# Patient Record
Sex: FEMALE | Race: BLACK OR AFRICAN AMERICAN | Employment: FULL TIME | URBAN - METROPOLITAN AREA
[De-identification: names, ages, dates, MRNs, and addresses within clinical notes are randomized per-mention and may not be internally consistent; named-entity substitution may affect disease eponyms.]

---

## 2020-01-01 ENCOUNTER — HOSPITAL ENCOUNTER (OUTPATIENT)
Dept: LAB | Age: 50
Discharge: HOME | End: 2020-10-12

## 2020-01-01 ENCOUNTER — HOSPITAL ENCOUNTER (OUTPATIENT)
Dept: INFUSION THERAPY | Age: 50
Discharge: HOME | End: 2020-12-09
Payer: COMMERCIAL

## 2020-01-01 ENCOUNTER — HOSPITAL ENCOUNTER (OUTPATIENT)
Dept: INFUSION THERAPY | Age: 50
Discharge: HOME | End: 2020-11-01
Payer: COMMERCIAL

## 2020-01-01 ENCOUNTER — HOSPITAL ENCOUNTER (OUTPATIENT)
Dept: INTERVENTIONAL RADIOLOGY/VASCULAR | Age: 50
Discharge: HOME | End: 2020-10-23
Attending: NURSE PRACTITIONER
Payer: COMMERCIAL

## 2020-01-01 ENCOUNTER — APPOINTMENT (OUTPATIENT)
Dept: INFUSION THERAPY | Age: 50
End: 2020-01-01

## 2020-01-01 ENCOUNTER — HOSPITAL ENCOUNTER (INPATIENT)
Age: 50
LOS: 6 days | Discharge: HOME | DRG: 846 | End: 2020-11-18
Attending: INTERNAL MEDICINE | Admitting: INTERNAL MEDICINE
Payer: COMMERCIAL

## 2020-01-01 ENCOUNTER — HOSPITAL ENCOUNTER (OUTPATIENT)
Dept: INFUSION THERAPY | Age: 50
Discharge: HOME | End: 2020-12-30
Payer: COMMERCIAL

## 2020-01-01 ENCOUNTER — HOSPITAL ENCOUNTER (OUTPATIENT)
Dept: INFUSION THERAPY | Age: 50
End: 2020-01-01

## 2020-01-01 ENCOUNTER — PATIENT OUTREACH (OUTPATIENT)
Dept: CASE MANAGEMENT | Age: 50
End: 2020-01-01

## 2020-01-01 ENCOUNTER — HOSPITAL ENCOUNTER (OUTPATIENT)
Dept: INFUSION THERAPY | Age: 50
Discharge: HOME | End: 2020-10-27
Payer: COMMERCIAL

## 2020-01-01 ENCOUNTER — HOSPITAL ENCOUNTER (OUTPATIENT)
Dept: LAB | Age: 50
Discharge: HOME | End: 2020-10-22
Payer: COMMERCIAL

## 2020-01-01 ENCOUNTER — HOSPITAL ENCOUNTER (OUTPATIENT)
Dept: INTERVENTIONAL RADIOLOGY/VASCULAR | Age: 50
Discharge: HOME | End: 2020-11-16
Attending: NURSE PRACTITIONER
Payer: COMMERCIAL

## 2020-01-01 ENCOUNTER — HOSPITAL ENCOUNTER (OUTPATIENT)
Dept: PET IMAGING | Age: 50
Discharge: HOME | End: 2020-12-02
Payer: COMMERCIAL

## 2020-01-01 ENCOUNTER — HOSPITAL ENCOUNTER (OUTPATIENT)
Dept: LAB | Age: 50
Discharge: HOME | End: 2020-11-27
Payer: COMMERCIAL

## 2020-01-01 ENCOUNTER — HOSPITAL ENCOUNTER (INPATIENT)
Age: 50
LOS: 4 days | Discharge: HOME | DRG: 847 | End: 2020-10-26
Attending: INTERNAL MEDICINE | Admitting: INTERNAL MEDICINE
Payer: COMMERCIAL

## 2020-01-01 ENCOUNTER — APPOINTMENT (OUTPATIENT)
Dept: INFUSION THERAPY | Age: 50
End: 2020-01-01
Payer: COMMERCIAL

## 2020-01-01 ENCOUNTER — APPOINTMENT (OUTPATIENT)
Dept: GENERAL RADIOLOGY | Age: 50
DRG: 846 | End: 2020-01-01
Attending: INTERNAL MEDICINE
Payer: COMMERCIAL

## 2020-01-01 ENCOUNTER — HOSPITAL ENCOUNTER (OUTPATIENT)
Dept: INFUSION THERAPY | Age: 50
End: 2020-01-01
Payer: COMMERCIAL

## 2020-01-01 ENCOUNTER — HOSPITAL ENCOUNTER (OUTPATIENT)
Dept: CT IMAGING | Age: 50
Discharge: HOME | End: 2020-10-15
Attending: INTERNAL MEDICINE
Payer: COMMERCIAL

## 2020-01-01 ENCOUNTER — HOSPITAL ENCOUNTER (OUTPATIENT)
Dept: INFUSION THERAPY | Age: 50
Discharge: HOME | End: 2020-12-10
Payer: COMMERCIAL

## 2020-01-01 ENCOUNTER — HOSPITAL ENCOUNTER (OUTPATIENT)
Dept: INFUSION THERAPY | Age: 50
Discharge: HOME | End: 2020-12-12
Payer: COMMERCIAL

## 2020-01-01 ENCOUNTER — HOSPITAL ENCOUNTER (OUTPATIENT)
Dept: INFUSION THERAPY | Age: 50
Discharge: HOME | End: 2020-11-27
Payer: COMMERCIAL

## 2020-01-01 ENCOUNTER — HOSPITAL ENCOUNTER (OUTPATIENT)
Dept: LAB | Age: 50
Discharge: HOME | End: 2020-11-19
Payer: COMMERCIAL

## 2020-01-01 ENCOUNTER — HOSPITAL ENCOUNTER (OUTPATIENT)
Dept: NON INVASIVE DIAGNOSTICS | Age: 50
Discharge: HOME | End: 2020-10-15
Attending: INTERNAL MEDICINE
Payer: COMMERCIAL

## 2020-01-01 ENCOUNTER — HOSPITAL ENCOUNTER (OUTPATIENT)
Dept: INTERVENTIONAL RADIOLOGY/VASCULAR | Age: 50
Discharge: HOME | DRG: 846 | End: 2020-11-11
Attending: INTERNAL MEDICINE
Payer: COMMERCIAL

## 2020-01-01 ENCOUNTER — HOSPITAL ENCOUNTER (OUTPATIENT)
Dept: LAB | Age: 50
Discharge: HOME | End: 2020-12-03
Payer: COMMERCIAL

## 2020-01-01 ENCOUNTER — HOSPITAL ENCOUNTER (OUTPATIENT)
Dept: INFUSION THERAPY | Age: 50
Discharge: HOME | End: 2020-11-05
Payer: COMMERCIAL

## 2020-01-01 ENCOUNTER — APPOINTMENT (OUTPATIENT)
Dept: INFUSION THERAPY | Age: 50
DRG: 846 | End: 2020-01-01
Payer: COMMERCIAL

## 2020-01-01 ENCOUNTER — HOSPITAL ENCOUNTER (OUTPATIENT)
Dept: INFUSION THERAPY | Age: 50
Discharge: HOME | End: 2020-12-31
Payer: COMMERCIAL

## 2020-01-01 ENCOUNTER — HOSPITAL ENCOUNTER (OUTPATIENT)
Dept: INFUSION THERAPY | Age: 50
Discharge: HOME | End: 2020-11-30
Payer: COMMERCIAL

## 2020-01-01 ENCOUNTER — HOSPITAL ENCOUNTER (OUTPATIENT)
Dept: INTERVENTIONAL RADIOLOGY/VASCULAR | Age: 50
Discharge: HOME | End: 2020-11-20
Attending: INTERNAL MEDICINE
Payer: COMMERCIAL

## 2020-01-01 ENCOUNTER — HOSPITAL ENCOUNTER (OUTPATIENT)
Dept: ULTRASOUND IMAGING | Age: 50
Discharge: HOME | End: 2020-10-13
Attending: INTERNAL MEDICINE
Payer: COMMERCIAL

## 2020-01-01 ENCOUNTER — HOSPITAL ENCOUNTER (OUTPATIENT)
Dept: INFUSION THERAPY | Age: 50
Discharge: HOME | End: 2020-11-21
Payer: COMMERCIAL

## 2020-01-01 ENCOUNTER — HOSPITAL ENCOUNTER (OUTPATIENT)
Dept: LAB | Age: 50
Discharge: HOME | End: 2020-10-07
Payer: COMMERCIAL

## 2020-01-01 ENCOUNTER — HOSPITAL ENCOUNTER (OUTPATIENT)
Dept: INFUSION THERAPY | Age: 50
Discharge: HOME | End: 2020-10-29
Payer: COMMERCIAL

## 2020-01-01 ENCOUNTER — HOSPITAL ENCOUNTER (OUTPATIENT)
Dept: INFUSION THERAPY | Age: 50
Discharge: HOME | End: 2020-12-23
Payer: COMMERCIAL

## 2020-01-01 ENCOUNTER — HOSPITAL ENCOUNTER (OUTPATIENT)
Dept: INTERVENTIONAL RADIOLOGY/VASCULAR | Age: 50
Discharge: HOME | End: 2020-11-02
Attending: NURSE PRACTITIONER
Payer: COMMERCIAL

## 2020-01-01 ENCOUNTER — HOSPITAL ENCOUNTER (OUTPATIENT)
Dept: INFUSION THERAPY | Age: 50
Discharge: HOME | End: 2020-11-19

## 2020-01-01 ENCOUNTER — HOSPITAL ENCOUNTER (OUTPATIENT)
Dept: INFUSION THERAPY | Age: 50
Discharge: HOME | DRG: 846 | End: 2020-11-09
Payer: COMMERCIAL

## 2020-01-01 ENCOUNTER — HOSPITAL ENCOUNTER (OUTPATIENT)
Dept: LAB | Age: 50
Discharge: HOME | DRG: 846 | End: 2020-11-12
Payer: COMMERCIAL

## 2020-01-01 VITALS
RESPIRATION RATE: 18 BRPM | HEART RATE: 107 BPM | OXYGEN SATURATION: 100 % | TEMPERATURE: 97.6 F | DIASTOLIC BLOOD PRESSURE: 64 MMHG | SYSTOLIC BLOOD PRESSURE: 105 MMHG

## 2020-01-01 VITALS
HEART RATE: 103 BPM | TEMPERATURE: 98.9 F | DIASTOLIC BLOOD PRESSURE: 76 MMHG | OXYGEN SATURATION: 100 % | WEIGHT: 190.4 LBS | BODY MASS INDEX: 32.5 KG/M2 | SYSTOLIC BLOOD PRESSURE: 114 MMHG | RESPIRATION RATE: 18 BRPM | HEIGHT: 64 IN

## 2020-01-01 VITALS
DIASTOLIC BLOOD PRESSURE: 72 MMHG | OXYGEN SATURATION: 96 % | RESPIRATION RATE: 16 BRPM | HEIGHT: 64 IN | BODY MASS INDEX: 33.29 KG/M2 | WEIGHT: 195 LBS | SYSTOLIC BLOOD PRESSURE: 102 MMHG | TEMPERATURE: 98.7 F | HEART RATE: 73 BPM

## 2020-01-01 VITALS
HEART RATE: 68 BPM | DIASTOLIC BLOOD PRESSURE: 85 MMHG | RESPIRATION RATE: 16 BRPM | OXYGEN SATURATION: 100 % | SYSTOLIC BLOOD PRESSURE: 139 MMHG

## 2020-01-01 VITALS
HEART RATE: 98 BPM | DIASTOLIC BLOOD PRESSURE: 63 MMHG | BODY MASS INDEX: 32.44 KG/M2 | SYSTOLIC BLOOD PRESSURE: 127 MMHG | OXYGEN SATURATION: 100 % | RESPIRATION RATE: 16 BRPM | WEIGHT: 190 LBS | TEMPERATURE: 99 F | HEIGHT: 64 IN

## 2020-01-01 VITALS
DIASTOLIC BLOOD PRESSURE: 71 MMHG | HEART RATE: 77 BPM | TEMPERATURE: 98.4 F | SYSTOLIC BLOOD PRESSURE: 110 MMHG | RESPIRATION RATE: 18 BRPM

## 2020-01-01 VITALS
OXYGEN SATURATION: 99 % | SYSTOLIC BLOOD PRESSURE: 122 MMHG | BODY MASS INDEX: 30.73 KG/M2 | RESPIRATION RATE: 16 BRPM | DIASTOLIC BLOOD PRESSURE: 81 MMHG | TEMPERATURE: 98.7 F | HEART RATE: 111 BPM | WEIGHT: 179 LBS

## 2020-01-01 VITALS
RESPIRATION RATE: 18 BRPM | HEART RATE: 92 BPM | OXYGEN SATURATION: 98 % | TEMPERATURE: 97.6 F | BODY MASS INDEX: 31.89 KG/M2 | SYSTOLIC BLOOD PRESSURE: 125 MMHG | WEIGHT: 180 LBS | DIASTOLIC BLOOD PRESSURE: 86 MMHG

## 2020-01-01 VITALS
SYSTOLIC BLOOD PRESSURE: 118 MMHG | RESPIRATION RATE: 16 BRPM | BODY MASS INDEX: 31.76 KG/M2 | WEIGHT: 186 LBS | TEMPERATURE: 98.4 F | DIASTOLIC BLOOD PRESSURE: 73 MMHG | HEART RATE: 99 BPM | HEIGHT: 64 IN | OXYGEN SATURATION: 99 %

## 2020-01-01 VITALS
HEIGHT: 64 IN | TEMPERATURE: 97.9 F | WEIGHT: 176.8 LBS | BODY MASS INDEX: 30.19 KG/M2 | SYSTOLIC BLOOD PRESSURE: 107 MMHG | DIASTOLIC BLOOD PRESSURE: 69 MMHG | OXYGEN SATURATION: 99 % | RESPIRATION RATE: 16 BRPM | HEART RATE: 103 BPM

## 2020-01-01 VITALS
DIASTOLIC BLOOD PRESSURE: 79 MMHG | RESPIRATION RATE: 16 BRPM | TEMPERATURE: 97.1 F | SYSTOLIC BLOOD PRESSURE: 123 MMHG | HEART RATE: 91 BPM | OXYGEN SATURATION: 100 %

## 2020-01-01 VITALS
SYSTOLIC BLOOD PRESSURE: 125 MMHG | RESPIRATION RATE: 16 BRPM | OXYGEN SATURATION: 100 % | TEMPERATURE: 98.4 F | DIASTOLIC BLOOD PRESSURE: 76 MMHG | HEART RATE: 104 BPM

## 2020-01-01 VITALS
WEIGHT: 190 LBS | DIASTOLIC BLOOD PRESSURE: 89 MMHG | HEART RATE: 74 BPM | HEIGHT: 64 IN | TEMPERATURE: 97.6 F | RESPIRATION RATE: 16 BRPM | BODY MASS INDEX: 32.44 KG/M2 | OXYGEN SATURATION: 100 % | SYSTOLIC BLOOD PRESSURE: 138 MMHG

## 2020-01-01 VITALS
OXYGEN SATURATION: 97 % | TEMPERATURE: 98 F | RESPIRATION RATE: 16 BRPM | DIASTOLIC BLOOD PRESSURE: 64 MMHG | BODY MASS INDEX: 30.22 KG/M2 | SYSTOLIC BLOOD PRESSURE: 100 MMHG | HEART RATE: 86 BPM | WEIGHT: 177 LBS | HEIGHT: 64 IN

## 2020-01-01 VITALS
SYSTOLIC BLOOD PRESSURE: 109 MMHG | HEIGHT: 64 IN | BODY MASS INDEX: 32.6 KG/M2 | HEART RATE: 85 BPM | DIASTOLIC BLOOD PRESSURE: 66 MMHG | RESPIRATION RATE: 16 BRPM

## 2020-01-01 VITALS
DIASTOLIC BLOOD PRESSURE: 92 MMHG | TEMPERATURE: 98.1 F | SYSTOLIC BLOOD PRESSURE: 139 MMHG | OXYGEN SATURATION: 100 % | HEART RATE: 100 BPM | RESPIRATION RATE: 16 BRPM

## 2020-01-01 VITALS
TEMPERATURE: 98 F | HEART RATE: 97 BPM | OXYGEN SATURATION: 95 % | SYSTOLIC BLOOD PRESSURE: 128 MMHG | RESPIRATION RATE: 16 BRPM | DIASTOLIC BLOOD PRESSURE: 78 MMHG

## 2020-01-01 VITALS
SYSTOLIC BLOOD PRESSURE: 113 MMHG | HEART RATE: 113 BPM | OXYGEN SATURATION: 97 % | TEMPERATURE: 98 F | RESPIRATION RATE: 16 BRPM | DIASTOLIC BLOOD PRESSURE: 70 MMHG

## 2020-01-01 VITALS
RESPIRATION RATE: 18 BRPM | SYSTOLIC BLOOD PRESSURE: 110 MMHG | OXYGEN SATURATION: 100 % | TEMPERATURE: 97.8 F | DIASTOLIC BLOOD PRESSURE: 75 MMHG | HEART RATE: 103 BPM

## 2020-01-01 VITALS
DIASTOLIC BLOOD PRESSURE: 74 MMHG | WEIGHT: 196.4 LBS | HEIGHT: 64 IN | SYSTOLIC BLOOD PRESSURE: 104 MMHG | HEART RATE: 84 BPM | RESPIRATION RATE: 18 BRPM | TEMPERATURE: 97.3 F | OXYGEN SATURATION: 100 % | BODY MASS INDEX: 33.53 KG/M2

## 2020-01-01 VITALS
RESPIRATION RATE: 18 BRPM | SYSTOLIC BLOOD PRESSURE: 114 MMHG | OXYGEN SATURATION: 100 % | HEART RATE: 94 BPM | DIASTOLIC BLOOD PRESSURE: 73 MMHG | TEMPERATURE: 97.3 F

## 2020-01-01 VITALS
TEMPERATURE: 97.9 F | SYSTOLIC BLOOD PRESSURE: 126 MMHG | HEART RATE: 101 BPM | RESPIRATION RATE: 18 BRPM | DIASTOLIC BLOOD PRESSURE: 77 MMHG | OXYGEN SATURATION: 98 %

## 2020-01-01 DIAGNOSIS — C83.37 DIFFUSE LARGE B-CELL LYMPHOMA OF SPLEEN (HCC): Primary | ICD-10-CM

## 2020-01-01 DIAGNOSIS — C83.30 DIFFUSE LARGE B-CELL LYMPHOMA, UNSPECIFIED BODY REGION (HCC): Primary | ICD-10-CM

## 2020-01-01 DIAGNOSIS — C83.30 DIFFUSE LARGE B-CELL LYMPHOMA, UNSPECIFIED BODY REGION (HCC): ICD-10-CM

## 2020-01-01 DIAGNOSIS — Z51.11 ADMISSION FOR ANTINEOPLASTIC CHEMOTHERAPY: ICD-10-CM

## 2020-01-01 DIAGNOSIS — C83.37 DIFFUSE LARGE B-CELL LYMPHOMA OF SPLEEN (HCC): ICD-10-CM

## 2020-01-01 DIAGNOSIS — D84.9 IMMUNOCOMPROMISED (HCC): ICD-10-CM

## 2020-01-01 DIAGNOSIS — C85.90 LYMPHOMA (HCC): ICD-10-CM

## 2020-01-01 DIAGNOSIS — Z95.828 PORT-A-CATH IN PLACE: ICD-10-CM

## 2020-01-01 DIAGNOSIS — T82.898A OCCLUDED PICC LINE, INITIAL ENCOUNTER (HCC): ICD-10-CM

## 2020-01-01 LAB
ABO + RH BLD: NORMAL
ALBUMIN SERPL-MCNC: 2.6 G/DL (ref 3.5–5)
ALBUMIN SERPL-MCNC: 2.7 G/DL (ref 3.5–5)
ALBUMIN SERPL-MCNC: 2.8 G/DL (ref 3.5–5)
ALBUMIN SERPL-MCNC: 2.9 G/DL (ref 3.5–5)
ALBUMIN SERPL-MCNC: 3 G/DL (ref 3.5–5)
ALBUMIN SERPL-MCNC: 3 G/DL (ref 3.5–5)
ALBUMIN SERPL-MCNC: 3.1 G/DL (ref 3.5–5)
ALBUMIN SERPL-MCNC: 3.1 G/DL (ref 3.5–5)
ALBUMIN SERPL-MCNC: 3.2 G/DL (ref 3.5–5)
ALBUMIN SERPL-MCNC: 3.3 G/DL (ref 3.5–5)
ALBUMIN SERPL-MCNC: 3.3 G/DL (ref 3.5–5)
ALBUMIN SERPL-MCNC: 3.4 G/DL (ref 3.5–5)
ALBUMIN/GLOB SERPL: 0.6 {RATIO} (ref 1.2–3.5)
ALBUMIN/GLOB SERPL: 0.7 {RATIO} (ref 1.2–3.5)
ALBUMIN/GLOB SERPL: 0.8 {RATIO} (ref 1.2–3.5)
ALBUMIN/GLOB SERPL: 0.9 {RATIO} (ref 1.2–3.5)
ALP SERPL-CCNC: 296 U/L (ref 50–136)
ALP SERPL-CCNC: 426 U/L (ref 50–136)
ALP SERPL-CCNC: 444 U/L (ref 50–136)
ALP SERPL-CCNC: 453 U/L (ref 50–136)
ALP SERPL-CCNC: 479 U/L (ref 50–136)
ALP SERPL-CCNC: 495 U/L (ref 50–136)
ALP SERPL-CCNC: 507 U/L (ref 50–136)
ALP SERPL-CCNC: 541 U/L (ref 50–136)
ALP SERPL-CCNC: 546 U/L (ref 50–136)
ALP SERPL-CCNC: 554 U/L (ref 50–136)
ALP SERPL-CCNC: 561 U/L (ref 50–136)
ALP SERPL-CCNC: 571 U/L (ref 50–136)
ALP SERPL-CCNC: 578 U/L (ref 50–136)
ALP SERPL-CCNC: 611 U/L (ref 50–130)
ALP SERPL-CCNC: 612 U/L (ref 50–136)
ALP SERPL-CCNC: 618 U/L (ref 50–136)
ALP SERPL-CCNC: 620 U/L (ref 50–130)
ALP SERPL-CCNC: 631 U/L (ref 50–136)
ALP SERPL-CCNC: 637 U/L (ref 50–130)
ALP SERPL-CCNC: 643 U/L (ref 50–130)
ALP SERPL-CCNC: 646 U/L (ref 50–136)
ALP SERPL-CCNC: 661 U/L (ref 50–130)
ALP SERPL-CCNC: 671 U/L (ref 50–136)
ALP SERPL-CCNC: 673 U/L (ref 50–136)
ALP SERPL-CCNC: 683 U/L (ref 50–136)
ALP SERPL-CCNC: 710 U/L (ref 50–136)
ALT SERPL-CCNC: 22 U/L (ref 12–65)
ALT SERPL-CCNC: 27 U/L (ref 12–65)
ALT SERPL-CCNC: 27 U/L (ref 12–65)
ALT SERPL-CCNC: 28 U/L (ref 12–65)
ALT SERPL-CCNC: 28 U/L (ref 12–65)
ALT SERPL-CCNC: 30 U/L (ref 12–65)
ALT SERPL-CCNC: 31 U/L (ref 12–65)
ALT SERPL-CCNC: 34 U/L (ref 12–65)
ALT SERPL-CCNC: 36 U/L (ref 12–65)
ALT SERPL-CCNC: 36 U/L (ref 12–65)
ALT SERPL-CCNC: 38 U/L (ref 12–65)
ALT SERPL-CCNC: 38 U/L (ref 12–65)
ALT SERPL-CCNC: 40 U/L (ref 12–65)
ALT SERPL-CCNC: 40 U/L (ref 12–65)
ALT SERPL-CCNC: 41 U/L (ref 12–65)
ALT SERPL-CCNC: 45 U/L (ref 12–65)
ALT SERPL-CCNC: 47 U/L (ref 12–65)
ALT SERPL-CCNC: 50 U/L (ref 12–65)
ALT SERPL-CCNC: 50 U/L (ref 12–65)
ALT SERPL-CCNC: 53 U/L (ref 12–65)
ANION GAP SERPL CALC-SCNC: 3 MMOL/L (ref 7–16)
ANION GAP SERPL CALC-SCNC: 4 MMOL/L (ref 7–16)
ANION GAP SERPL CALC-SCNC: 4 MMOL/L (ref 7–16)
ANION GAP SERPL CALC-SCNC: 5 MMOL/L (ref 7–16)
ANION GAP SERPL CALC-SCNC: 6 MMOL/L (ref 7–16)
ANION GAP SERPL CALC-SCNC: 7 MMOL/L (ref 7–16)
ANION GAP SERPL CALC-SCNC: 8 MMOL/L (ref 7–16)
ANION GAP SERPL CALC-SCNC: 9 MMOL/L (ref 7–16)
APPEARANCE FLD: NORMAL
AST SERPL-CCNC: 115 U/L (ref 15–37)
AST SERPL-CCNC: 145 U/L (ref 15–37)
AST SERPL-CCNC: 162 U/L (ref 15–37)
AST SERPL-CCNC: 47 U/L (ref 15–37)
AST SERPL-CCNC: 56 U/L (ref 15–37)
AST SERPL-CCNC: 58 U/L (ref 15–37)
AST SERPL-CCNC: 60 U/L (ref 15–37)
AST SERPL-CCNC: 60 U/L (ref 15–37)
AST SERPL-CCNC: 62 U/L (ref 15–37)
AST SERPL-CCNC: 63 U/L (ref 15–37)
AST SERPL-CCNC: 63 U/L (ref 15–37)
AST SERPL-CCNC: 64 U/L (ref 15–37)
AST SERPL-CCNC: 66 U/L (ref 15–37)
AST SERPL-CCNC: 66 U/L (ref 15–37)
AST SERPL-CCNC: 75 U/L (ref 15–37)
AST SERPL-CCNC: 75 U/L (ref 15–37)
AST SERPL-CCNC: 78 U/L (ref 15–37)
AST SERPL-CCNC: 78 U/L (ref 15–37)
AST SERPL-CCNC: 81 U/L (ref 15–37)
AST SERPL-CCNC: 81 U/L (ref 15–37)
AST SERPL-CCNC: 82 U/L (ref 15–37)
AST SERPL-CCNC: 83 U/L (ref 15–37)
AST SERPL-CCNC: 85 U/L (ref 15–37)
AST SERPL-CCNC: 85 U/L (ref 15–37)
AST SERPL-CCNC: 86 U/L (ref 15–37)
AST SERPL-CCNC: 87 U/L (ref 15–37)
BASOPHILS # BLD: 0 K/UL (ref 0–0.2)
BASOPHILS # BLD: 0.1 K/UL (ref 0–0.2)
BASOPHILS NFR BLD: 0 % (ref 0–2)
BASOPHILS NFR BLD: 1 % (ref 0–2)
BILIRUB SERPL-MCNC: 0.3 MG/DL (ref 0.2–1.1)
BILIRUB SERPL-MCNC: 0.4 MG/DL (ref 0.2–1.1)
BILIRUB SERPL-MCNC: 0.5 MG/DL (ref 0.2–1.1)
BILIRUB SERPL-MCNC: 0.5 MG/DL (ref 0.2–1.1)
BILIRUB SERPL-MCNC: 0.6 MG/DL (ref 0.2–1.1)
BILIRUB SERPL-MCNC: 0.7 MG/DL (ref 0.2–1.1)
BILIRUB SERPL-MCNC: 0.8 MG/DL (ref 0.2–1.1)
BILIRUB SERPL-MCNC: 0.9 MG/DL (ref 0.2–1.1)
BILIRUB SERPL-MCNC: 0.9 MG/DL (ref 0.2–1.1)
BILIRUB SERPL-MCNC: 1 MG/DL (ref 0.2–1.1)
BILIRUB SERPL-MCNC: 1.3 MG/DL (ref 0.2–1.1)
BLD PROD TYP BPU: NORMAL
BLOOD BANK CMNT PATIENT-IMP: NORMAL
BLOOD GROUP ANTIBODIES SERPL: NORMAL
BPU ID: NORMAL
BUN SERPL-MCNC: 10 MG/DL (ref 6–23)
BUN SERPL-MCNC: 10 MG/DL (ref 6–23)
BUN SERPL-MCNC: 11 MG/DL (ref 6–23)
BUN SERPL-MCNC: 13 MG/DL (ref 6–23)
BUN SERPL-MCNC: 14 MG/DL (ref 6–23)
BUN SERPL-MCNC: 20 MG/DL (ref 6–23)
BUN SERPL-MCNC: 25 MG/DL (ref 6–23)
BUN SERPL-MCNC: 4 MG/DL (ref 6–23)
BUN SERPL-MCNC: 5 MG/DL (ref 6–23)
BUN SERPL-MCNC: 6 MG/DL (ref 6–23)
BUN SERPL-MCNC: 7 MG/DL (ref 6–23)
BUN SERPL-MCNC: 8 MG/DL (ref 6–23)
BUN SERPL-MCNC: 9 MG/DL (ref 6–23)
BUN SERPL-MCNC: 9 MG/DL (ref 6–23)
CALCIUM SERPL-MCNC: 10 MG/DL (ref 8.3–10.4)
CALCIUM SERPL-MCNC: 8.5 MG/DL (ref 8.3–10.4)
CALCIUM SERPL-MCNC: 8.7 MG/DL (ref 8.3–10.4)
CALCIUM SERPL-MCNC: 8.7 MG/DL (ref 8.3–10.4)
CALCIUM SERPL-MCNC: 8.8 MG/DL (ref 8.3–10.4)
CALCIUM SERPL-MCNC: 9 MG/DL (ref 8.3–10.4)
CALCIUM SERPL-MCNC: 9 MG/DL (ref 8.3–10.4)
CALCIUM SERPL-MCNC: 9.1 MG/DL (ref 8.3–10.4)
CALCIUM SERPL-MCNC: 9.2 MG/DL (ref 8.3–10.4)
CALCIUM SERPL-MCNC: 9.2 MG/DL (ref 8.3–10.4)
CALCIUM SERPL-MCNC: 9.3 MG/DL (ref 8.3–10.4)
CALCIUM SERPL-MCNC: 9.4 MG/DL (ref 8.3–10.4)
CALCIUM SERPL-MCNC: 9.5 MG/DL (ref 8.3–10.4)
CALCIUM SERPL-MCNC: 9.6 MG/DL (ref 8.3–10.4)
CALCIUM SERPL-MCNC: 9.7 MG/DL (ref 8.3–10.4)
CALCIUM SERPL-MCNC: 9.7 MG/DL (ref 8.3–10.4)
CALCIUM SERPL-MCNC: 9.8 MG/DL (ref 8.3–10.4)
CALCIUM SERPL-MCNC: 9.9 MG/DL (ref 8.3–10.4)
CALCIUM SERPL-MCNC: 9.9 MG/DL (ref 8.3–10.4)
CHLORIDE SERPL-SCNC: 101 MMOL/L (ref 98–107)
CHLORIDE SERPL-SCNC: 102 MMOL/L (ref 98–107)
CHLORIDE SERPL-SCNC: 102 MMOL/L (ref 98–107)
CHLORIDE SERPL-SCNC: 103 MMOL/L (ref 98–107)
CHLORIDE SERPL-SCNC: 104 MMOL/L (ref 98–107)
CHLORIDE SERPL-SCNC: 105 MMOL/L (ref 98–107)
CHLORIDE SERPL-SCNC: 106 MMOL/L (ref 98–107)
CHLORIDE SERPL-SCNC: 106 MMOL/L (ref 98–107)
CHLORIDE SERPL-SCNC: 107 MMOL/L (ref 98–107)
CHLORIDE SERPL-SCNC: 108 MMOL/L (ref 98–107)
CHLORIDE SERPL-SCNC: 109 MMOL/L (ref 98–107)
CHLORIDE SERPL-SCNC: 111 MMOL/L (ref 98–107)
CO2 SERPL-SCNC: 24 MMOL/L (ref 21–32)
CO2 SERPL-SCNC: 25 MMOL/L (ref 21–32)
CO2 SERPL-SCNC: 25 MMOL/L (ref 21–32)
CO2 SERPL-SCNC: 26 MMOL/L (ref 21–32)
CO2 SERPL-SCNC: 27 MMOL/L (ref 21–32)
CO2 SERPL-SCNC: 27 MMOL/L (ref 21–32)
CO2 SERPL-SCNC: 28 MMOL/L (ref 21–32)
CO2 SERPL-SCNC: 29 MMOL/L (ref 21–32)
CO2 SERPL-SCNC: 30 MMOL/L (ref 21–32)
CO2 SERPL-SCNC: 32 MMOL/L (ref 21–32)
CO2 SERPL-SCNC: 33 MMOL/L (ref 21–32)
COLLECTION COMMENT, COLCM: NORMAL
COLOR FLD: NORMAL
CREAT SERPL-MCNC: 0.46 MG/DL (ref 0.6–1)
CREAT SERPL-MCNC: 0.5 MG/DL (ref 0.6–1)
CREAT SERPL-MCNC: 0.52 MG/DL (ref 0.6–1)
CREAT SERPL-MCNC: 0.52 MG/DL (ref 0.6–1)
CREAT SERPL-MCNC: 0.54 MG/DL (ref 0.6–1)
CREAT SERPL-MCNC: 0.55 MG/DL (ref 0.6–1)
CREAT SERPL-MCNC: 0.56 MG/DL (ref 0.6–1)
CREAT SERPL-MCNC: 0.57 MG/DL (ref 0.6–1)
CREAT SERPL-MCNC: 0.58 MG/DL (ref 0.6–1)
CREAT SERPL-MCNC: 0.59 MG/DL (ref 0.6–1)
CREAT SERPL-MCNC: 0.6 MG/DL (ref 0.6–1)
CREAT SERPL-MCNC: 0.7 MG/DL (ref 0.6–1)
CREAT SERPL-MCNC: 0.8 MG/DL (ref 0.6–1)
CREAT SERPL-MCNC: 0.9 MG/DL (ref 0.6–1)
CREAT SERPL-MCNC: 0.9 MG/DL (ref 0.6–1)
CROSSMATCH RESULT,%XM: NORMAL
CROSSMATCH RESULT,%XM: NORMAL
DIFFERENTIAL METHOD BLD: ABNORMAL
EOSINOPHIL # BLD: 0 K/UL (ref 0–0.8)
EOSINOPHIL # BLD: 0.1 K/UL (ref 0–0.8)
EOSINOPHIL # BLD: 0.2 K/UL (ref 0–0.8)
EOSINOPHIL # BLD: 0.3 K/UL (ref 0–0.8)
EOSINOPHIL # BLD: 0.3 K/UL (ref 0–0.8)
EOSINOPHIL # BLD: 0.4 K/UL (ref 0–0.8)
EOSINOPHIL # BLD: 0.6 K/UL (ref 0–0.8)
EOSINOPHIL NFR BLD MANUAL: 3 % (ref 1–8)
EOSINOPHIL NFR BLD: 0 % (ref 0.5–7.8)
EOSINOPHIL NFR BLD: 1 % (ref 0.5–7.8)
EOSINOPHIL NFR BLD: 12 % (ref 0.5–7.8)
EOSINOPHIL NFR BLD: 17 % (ref 0.5–7.8)
EOSINOPHIL NFR BLD: 3 % (ref 0.5–7.8)
EOSINOPHIL NFR BLD: 5 % (ref 0.5–7.8)
EOSINOPHIL NFR BLD: 6 % (ref 0.5–7.8)
EOSINOPHIL NFR BLD: 8 % (ref 0.5–7.8)
ERYTHROCYTE [DISTWIDTH] IN BLOOD BY AUTOMATED COUNT: 15.9 % (ref 11.9–14.6)
ERYTHROCYTE [DISTWIDTH] IN BLOOD BY AUTOMATED COUNT: 17 % (ref 11.9–14.6)
ERYTHROCYTE [DISTWIDTH] IN BLOOD BY AUTOMATED COUNT: 17 % (ref 11.9–14.6)
ERYTHROCYTE [DISTWIDTH] IN BLOOD BY AUTOMATED COUNT: 17.1 % (ref 11.9–14.6)
ERYTHROCYTE [DISTWIDTH] IN BLOOD BY AUTOMATED COUNT: 17.2 % (ref 11.9–14.6)
ERYTHROCYTE [DISTWIDTH] IN BLOOD BY AUTOMATED COUNT: 17.3 % (ref 11.9–14.6)
ERYTHROCYTE [DISTWIDTH] IN BLOOD BY AUTOMATED COUNT: 17.3 % (ref 11.9–14.6)
ERYTHROCYTE [DISTWIDTH] IN BLOOD BY AUTOMATED COUNT: 17.4 % (ref 11.9–14.6)
ERYTHROCYTE [DISTWIDTH] IN BLOOD BY AUTOMATED COUNT: 17.8 % (ref 11.9–14.6)
ERYTHROCYTE [DISTWIDTH] IN BLOOD BY AUTOMATED COUNT: 17.9 % (ref 11.9–14.6)
ERYTHROCYTE [DISTWIDTH] IN BLOOD BY AUTOMATED COUNT: 19.8 % (ref 11.9–14.6)
ERYTHROCYTE [DISTWIDTH] IN BLOOD BY AUTOMATED COUNT: 19.9 % (ref 11.9–14.6)
ERYTHROCYTE [DISTWIDTH] IN BLOOD BY AUTOMATED COUNT: 20.5 % (ref 11.9–14.6)
ERYTHROCYTE [DISTWIDTH] IN BLOOD BY AUTOMATED COUNT: 20.7 % (ref 11.9–14.6)
ERYTHROCYTE [DISTWIDTH] IN BLOOD BY AUTOMATED COUNT: 20.8 % (ref 11.9–14.6)
ERYTHROCYTE [DISTWIDTH] IN BLOOD BY AUTOMATED COUNT: 21.1 % (ref 11.9–14.6)
ERYTHROCYTE [DISTWIDTH] IN BLOOD BY AUTOMATED COUNT: 21.3 % (ref 11.9–14.6)
ERYTHROCYTE [DISTWIDTH] IN BLOOD BY AUTOMATED COUNT: 21.3 % (ref 11.9–14.6)
ERYTHROCYTE [DISTWIDTH] IN BLOOD BY AUTOMATED COUNT: 21.5 % (ref 11.9–14.6)
ERYTHROCYTE [DISTWIDTH] IN BLOOD BY AUTOMATED COUNT: 21.6 % (ref 11.9–14.6)
ERYTHROCYTE [DISTWIDTH] IN BLOOD BY AUTOMATED COUNT: 22.2 % (ref 11.9–14.6)
ERYTHROCYTE [DISTWIDTH] IN BLOOD BY AUTOMATED COUNT: 22.3 % (ref 11.9–14.6)
ERYTHROCYTE [DISTWIDTH] IN BLOOD BY AUTOMATED COUNT: 22.9 % (ref 11.9–14.6)
ERYTHROCYTE [DISTWIDTH] IN BLOOD BY AUTOMATED COUNT: 24 % (ref 11.9–14.6)
FERRITIN SERPL-MCNC: 1861 NG/ML (ref 8–388)
FLOW CYTOMETRY, FBTC1: NORMAL
FOLATE SERPL-MCNC: 16.4 NG/ML (ref 3.1–17.5)
GLOBULIN SER CALC-MCNC: 3.2 G/DL (ref 2.3–3.5)
GLOBULIN SER CALC-MCNC: 3.3 G/DL (ref 2.3–3.5)
GLOBULIN SER CALC-MCNC: 3.4 G/DL (ref 2.3–3.5)
GLOBULIN SER CALC-MCNC: 3.5 G/DL (ref 2.3–3.5)
GLOBULIN SER CALC-MCNC: 3.6 G/DL (ref 2.3–3.5)
GLOBULIN SER CALC-MCNC: 3.7 G/DL (ref 2.3–3.5)
GLOBULIN SER CALC-MCNC: 3.8 G/DL (ref 2.3–3.5)
GLOBULIN SER CALC-MCNC: 3.9 G/DL (ref 2.3–3.5)
GLOBULIN SER CALC-MCNC: 4 G/DL (ref 2.3–3.5)
GLOBULIN SER CALC-MCNC: 4 G/DL (ref 2.3–3.5)
GLOBULIN SER CALC-MCNC: 4.2 G/DL (ref 2.3–3.5)
GLOBULIN SER CALC-MCNC: 4.2 G/DL (ref 2.3–3.5)
GLOBULIN SER CALC-MCNC: 4.3 G/DL (ref 2.3–3.5)
GLOBULIN SER CALC-MCNC: 4.3 G/DL (ref 2.3–3.5)
GLOBULIN SER CALC-MCNC: 4.5 G/DL (ref 2.3–3.5)
GLOBULIN SER CALC-MCNC: 4.5 G/DL (ref 2.3–3.5)
GLOBULIN SER CALC-MCNC: 4.8 G/DL (ref 2.3–3.5)
GLUCOSE CSF-MCNC: 78 MG/DL (ref 40–70)
GLUCOSE SERPL-MCNC: 103 MG/DL (ref 65–100)
GLUCOSE SERPL-MCNC: 104 MG/DL (ref 65–100)
GLUCOSE SERPL-MCNC: 104 MG/DL (ref 65–100)
GLUCOSE SERPL-MCNC: 108 MG/DL (ref 65–100)
GLUCOSE SERPL-MCNC: 110 MG/DL (ref 65–100)
GLUCOSE SERPL-MCNC: 110 MG/DL (ref 65–100)
GLUCOSE SERPL-MCNC: 112 MG/DL (ref 65–100)
GLUCOSE SERPL-MCNC: 113 MG/DL (ref 65–100)
GLUCOSE SERPL-MCNC: 115 MG/DL (ref 65–100)
GLUCOSE SERPL-MCNC: 117 MG/DL (ref 65–100)
GLUCOSE SERPL-MCNC: 119 MG/DL (ref 65–100)
GLUCOSE SERPL-MCNC: 121 MG/DL (ref 65–100)
GLUCOSE SERPL-MCNC: 125 MG/DL (ref 65–100)
GLUCOSE SERPL-MCNC: 125 MG/DL (ref 65–100)
GLUCOSE SERPL-MCNC: 127 MG/DL (ref 65–100)
GLUCOSE SERPL-MCNC: 127 MG/DL (ref 65–100)
GLUCOSE SERPL-MCNC: 132 MG/DL (ref 65–100)
GLUCOSE SERPL-MCNC: 135 MG/DL (ref 65–100)
GLUCOSE SERPL-MCNC: 137 MG/DL (ref 65–100)
GLUCOSE SERPL-MCNC: 149 MG/DL (ref 65–100)
GLUCOSE SERPL-MCNC: 150 MG/DL (ref 65–100)
GLUCOSE SERPL-MCNC: 154 MG/DL (ref 65–100)
GLUCOSE SERPL-MCNC: 162 MG/DL (ref 65–100)
GLUCOSE SERPL-MCNC: 168 MG/DL (ref 65–100)
GLUCOSE SERPL-MCNC: 168 MG/DL (ref 65–100)
GLUCOSE SERPL-MCNC: 175 MG/DL (ref 65–100)
HAV IGM SER QL: NONREACTIVE
HBV CORE IGM SER QL: NONREACTIVE
HBV SURFACE AG SER QL: NONREACTIVE
HCT VFR BLD AUTO: 20.4 % (ref 35.8–46.3)
HCT VFR BLD AUTO: 22.2 % (ref 35.8–46.3)
HCT VFR BLD AUTO: 22.4 % (ref 35.8–46.3)
HCT VFR BLD AUTO: 22.5 % (ref 35.8–46.3)
HCT VFR BLD AUTO: 23.3 % (ref 35.8–46.3)
HCT VFR BLD AUTO: 23.6 % (ref 35.8–46.3)
HCT VFR BLD AUTO: 23.9 % (ref 35.8–46.3)
HCT VFR BLD AUTO: 24.5 % (ref 35.8–46.3)
HCT VFR BLD AUTO: 25.1 % (ref 35.8–46.3)
HCT VFR BLD AUTO: 25.2 % (ref 35.8–46.3)
HCT VFR BLD AUTO: 25.7 % (ref 35.8–46.3)
HCT VFR BLD AUTO: 25.8 % (ref 35.8–46.3)
HCT VFR BLD AUTO: 26.1 % (ref 35.8–46.3)
HCT VFR BLD AUTO: 26.2 % (ref 35.8–46.3)
HCT VFR BLD AUTO: 26.3 % (ref 35.8–46.3)
HCT VFR BLD AUTO: 26.3 % (ref 35.8–46.3)
HCT VFR BLD AUTO: 26.5 % (ref 35.8–46.3)
HCT VFR BLD AUTO: 27 % (ref 35.8–46.3)
HCT VFR BLD AUTO: 27.7 % (ref 35.8–46.3)
HCT VFR BLD AUTO: 28.3 % (ref 35.8–46.3)
HCT VFR BLD AUTO: 28.6 % (ref 35.8–46.3)
HCT VFR BLD AUTO: 29.5 % (ref 35.8–46.3)
HCT VFR BLD AUTO: 33 % (ref 35.8–46.3)
HCV AB SER QL: NONREACTIVE
HGB BLD-MCNC: 10.9 G/DL (ref 11.7–15.4)
HGB BLD-MCNC: 6.6 G/DL (ref 11.7–15.4)
HGB BLD-MCNC: 6.8 G/DL (ref 11.7–15.4)
HGB BLD-MCNC: 7 G/DL (ref 11.7–15.4)
HGB BLD-MCNC: 7 G/DL (ref 11.7–15.4)
HGB BLD-MCNC: 7.1 G/DL (ref 11.7–15.4)
HGB BLD-MCNC: 7.3 G/DL (ref 11.7–15.4)
HGB BLD-MCNC: 7.5 G/DL (ref 11.7–15.4)
HGB BLD-MCNC: 7.7 G/DL (ref 11.7–15.4)
HGB BLD-MCNC: 7.8 G/DL (ref 11.7–15.4)
HGB BLD-MCNC: 7.9 G/DL (ref 11.7–15.4)
HGB BLD-MCNC: 8 G/DL (ref 11.7–15.4)
HGB BLD-MCNC: 8.1 G/DL (ref 11.7–15.4)
HGB BLD-MCNC: 8.1 G/DL (ref 11.7–15.4)
HGB BLD-MCNC: 8.2 G/DL (ref 11.7–15.4)
HGB BLD-MCNC: 8.2 G/DL (ref 11.7–15.4)
HGB BLD-MCNC: 8.4 G/DL (ref 11.7–15.4)
HGB BLD-MCNC: 8.4 G/DL (ref 11.7–15.4)
HGB BLD-MCNC: 8.9 G/DL (ref 11.7–15.4)
HGB BLD-MCNC: 9.1 G/DL (ref 11.7–15.4)
HGB BLD-MCNC: 9.1 G/DL (ref 11.7–15.4)
HISTORY CHECKED?,CKHIST: NORMAL
HISTORY CHECKED?,CKHIST: NORMAL
HIV 1+2 AB+HIV1 P24 AG SERPL QL IA: NONREACTIVE
HIV12 RESULT COMMENT, HHIVC: ABNORMAL
IMM GRANULOCYTES # BLD AUTO: 0 K/UL (ref 0–0.5)
IMM GRANULOCYTES # BLD AUTO: 0.1 K/UL (ref 0–0.5)
IMM GRANULOCYTES # BLD AUTO: 0.2 K/UL (ref 0–0.5)
IMM GRANULOCYTES # BLD AUTO: 0.2 K/UL (ref 0–0.5)
IMM GRANULOCYTES # BLD AUTO: 0.3 K/UL (ref 0–0.5)
IMM GRANULOCYTES # BLD AUTO: 0.4 K/UL (ref 0–0.5)
IMM GRANULOCYTES # BLD AUTO: 0.4 K/UL (ref 0–0.5)
IMM GRANULOCYTES # BLD AUTO: 0.7 K/UL (ref 0–0.5)
IMM GRANULOCYTES # BLD AUTO: 0.8 K/UL (ref 0–0.5)
IMM GRANULOCYTES # BLD AUTO: 1 K/UL (ref 0–0.5)
IMM GRANULOCYTES NFR BLD AUTO: 1 % (ref 0–5)
IMM GRANULOCYTES NFR BLD AUTO: 11 % (ref 0–5)
IMM GRANULOCYTES NFR BLD AUTO: 14 % (ref 0–5)
IMM GRANULOCYTES NFR BLD AUTO: 2 % (ref 0–5)
IMM GRANULOCYTES NFR BLD AUTO: 3 % (ref 0–5)
IMM GRANULOCYTES NFR BLD AUTO: 3 % (ref 0–5)
IMM GRANULOCYTES NFR BLD AUTO: 4 % (ref 0–5)
IMM GRANULOCYTES NFR BLD AUTO: 4 % (ref 0–5)
IMM GRANULOCYTES NFR BLD AUTO: 5 % (ref 0–5)
IMM GRANULOCYTES NFR BLD AUTO: 6 % (ref 0–5)
IMM GRANULOCYTES NFR BLD AUTO: 7 % (ref 0–5)
IMM GRANULOCYTES NFR BLD AUTO: 9 % (ref 0–5)
IRON SATN MFR SERPL: 35 %
IRON SERPL-MCNC: 68 UG/DL (ref 35–150)
LDH SERPL L TO P-CCNC: 1121 U/L (ref 100–190)
LDH SERPL L TO P-CCNC: 1366 U/L (ref 100–190)
LDH SERPL L TO P-CCNC: 26 IU/L
LDH SERPL L TO P-CCNC: 822 U/L (ref 100–190)
LYMPHOCYTES # BLD: 0.1 K/UL (ref 0.5–4.6)
LYMPHOCYTES # BLD: 0.2 K/UL (ref 0.5–4.6)
LYMPHOCYTES # BLD: 0.3 K/UL (ref 0.5–4.6)
LYMPHOCYTES # BLD: 0.5 K/UL (ref 0.5–4.6)
LYMPHOCYTES NFR BLD MANUAL: 3 % (ref 16–44)
LYMPHOCYTES NFR BLD MANUAL: 3 % (ref 16–44)
LYMPHOCYTES NFR BLD MANUAL: 4 % (ref 16–44)
LYMPHOCYTES NFR BLD MANUAL: 6 % (ref 16–44)
LYMPHOCYTES NFR BLD: 1 % (ref 13–44)
LYMPHOCYTES NFR BLD: 2 % (ref 13–44)
LYMPHOCYTES NFR BLD: 3 % (ref 13–44)
LYMPHOCYTES NFR BLD: 4 % (ref 13–44)
LYMPHOCYTES NFR BLD: 4 % (ref 13–44)
LYMPHOCYTES NFR BLD: 5 % (ref 13–44)
LYMPHOCYTES NFR BLD: 5 % (ref 13–44)
LYMPHOCYTES NFR BLD: 6 % (ref 13–44)
LYMPHOCYTES NFR BLD: 7 % (ref 13–44)
LYMPHOCYTES NFR BLD: 7 % (ref 13–44)
LYMPHOCYTES NFR BLD: 8 % (ref 13–44)
MAGNESIUM SERPL-MCNC: 1.3 MG/DL (ref 1.8–2.4)
MAGNESIUM SERPL-MCNC: 1.5 MG/DL (ref 1.8–2.4)
MAGNESIUM SERPL-MCNC: 1.7 MG/DL (ref 1.8–2.4)
MAGNESIUM SERPL-MCNC: 1.8 MG/DL (ref 1.8–2.4)
MAGNESIUM SERPL-MCNC: 1.9 MG/DL (ref 1.8–2.4)
MAGNESIUM SERPL-MCNC: 2.1 MG/DL (ref 1.8–2.4)
MAGNESIUM SERPL-MCNC: 2.1 MG/DL (ref 1.8–2.4)
MCH RBC QN AUTO: 25.5 PG (ref 26.1–32.9)
MCH RBC QN AUTO: 25.7 PG (ref 26.1–32.9)
MCH RBC QN AUTO: 26 PG (ref 26.1–32.9)
MCH RBC QN AUTO: 26 PG (ref 26.1–32.9)
MCH RBC QN AUTO: 26.1 PG (ref 26.1–32.9)
MCH RBC QN AUTO: 26.2 PG (ref 26.1–32.9)
MCH RBC QN AUTO: 26.3 PG (ref 26.1–32.9)
MCH RBC QN AUTO: 26.3 PG (ref 26.1–32.9)
MCH RBC QN AUTO: 26.4 PG (ref 26.1–32.9)
MCH RBC QN AUTO: 26.5 PG (ref 26.1–32.9)
MCH RBC QN AUTO: 26.6 PG (ref 26.1–32.9)
MCH RBC QN AUTO: 26.7 PG (ref 26.1–32.9)
MCH RBC QN AUTO: 26.7 PG (ref 26.1–32.9)
MCH RBC QN AUTO: 26.8 PG (ref 26.1–32.9)
MCH RBC QN AUTO: 27 PG (ref 26.1–32.9)
MCH RBC QN AUTO: 27.2 PG (ref 26.1–32.9)
MCH RBC QN AUTO: 27.5 PG (ref 26.1–32.9)
MCH RBC QN AUTO: 27.5 PG (ref 26.1–32.9)
MCH RBC QN AUTO: 27.6 PG (ref 26.1–32.9)
MCH RBC QN AUTO: 27.7 PG (ref 26.1–32.9)
MCH RBC QN AUTO: 27.8 PG (ref 26.1–32.9)
MCH RBC QN AUTO: 29.5 PG (ref 26.1–32.9)
MCHC RBC AUTO-ENTMCNC: 29.8 G/DL (ref 31.4–35)
MCHC RBC AUTO-ENTMCNC: 30.2 G/DL (ref 31.4–35)
MCHC RBC AUTO-ENTMCNC: 30.3 G/DL (ref 31.4–35)
MCHC RBC AUTO-ENTMCNC: 30.4 G/DL (ref 31.4–35)
MCHC RBC AUTO-ENTMCNC: 30.4 G/DL (ref 31.4–35)
MCHC RBC AUTO-ENTMCNC: 30.5 G/DL (ref 31.4–35)
MCHC RBC AUTO-ENTMCNC: 30.6 G/DL (ref 31.4–35)
MCHC RBC AUTO-ENTMCNC: 30.8 G/DL (ref 31.4–35)
MCHC RBC AUTO-ENTMCNC: 30.9 G/DL (ref 31.4–35)
MCHC RBC AUTO-ENTMCNC: 30.9 G/DL (ref 31.4–35)
MCHC RBC AUTO-ENTMCNC: 31 G/DL (ref 31.4–35)
MCHC RBC AUTO-ENTMCNC: 31 G/DL (ref 31.4–35)
MCHC RBC AUTO-ENTMCNC: 31.1 G/DL (ref 31.4–35)
MCHC RBC AUTO-ENTMCNC: 31.2 G/DL (ref 31.4–35)
MCHC RBC AUTO-ENTMCNC: 31.3 G/DL (ref 31.4–35)
MCHC RBC AUTO-ENTMCNC: 31.4 G/DL (ref 31.4–35)
MCHC RBC AUTO-ENTMCNC: 31.4 G/DL (ref 31.4–35)
MCHC RBC AUTO-ENTMCNC: 31.8 G/DL (ref 31.4–35)
MCHC RBC AUTO-ENTMCNC: 31.9 G/DL (ref 31.4–35)
MCHC RBC AUTO-ENTMCNC: 32.2 G/DL (ref 31.4–35)
MCHC RBC AUTO-ENTMCNC: 32.4 G/DL (ref 31.4–35)
MCHC RBC AUTO-ENTMCNC: 32.7 G/DL (ref 31.4–35)
MCHC RBC AUTO-ENTMCNC: 33 G/DL (ref 31.4–35)
MCV RBC AUTO: 81.8 FL (ref 79.6–97.8)
MCV RBC AUTO: 82.1 FL (ref 79.6–97.8)
MCV RBC AUTO: 83.6 FL (ref 79.6–97.8)
MCV RBC AUTO: 84.2 FL (ref 79.6–97.8)
MCV RBC AUTO: 84.3 FL (ref 79.6–97.8)
MCV RBC AUTO: 84.5 FL (ref 79.6–97.8)
MCV RBC AUTO: 84.8 FL (ref 79.6–97.8)
MCV RBC AUTO: 85.1 FL (ref 79.6–97.8)
MCV RBC AUTO: 85.4 FL (ref 79.6–97.8)
MCV RBC AUTO: 85.7 FL (ref 79.6–97.8)
MCV RBC AUTO: 85.8 FL (ref 79.6–97.8)
MCV RBC AUTO: 85.9 FL (ref 79.6–97.8)
MCV RBC AUTO: 86 FL (ref 79.6–97.8)
MCV RBC AUTO: 86.1 FL (ref 79.6–97.8)
MCV RBC AUTO: 86.5 FL (ref 79.6–97.8)
MCV RBC AUTO: 86.7 FL (ref 79.6–97.8)
MCV RBC AUTO: 87.1 FL (ref 79.6–97.8)
MCV RBC AUTO: 87.1 FL (ref 79.6–97.8)
MCV RBC AUTO: 87.2 FL (ref 79.6–97.8)
MCV RBC AUTO: 87.6 FL (ref 79.6–97.8)
MCV RBC AUTO: 88 FL (ref 79.6–97.8)
MCV RBC AUTO: 88.1 FL (ref 79.6–97.8)
MCV RBC AUTO: 88.5 FL (ref 79.6–97.8)
MCV RBC AUTO: 88.9 FL (ref 79.6–97.8)
MCV RBC AUTO: 89.4 FL (ref 79.6–97.8)
MCV RBC AUTO: 89.9 FL (ref 79.6–97.8)
METAMYELOCYTES NFR BLD MANUAL: 1 %
METAMYELOCYTES NFR BLD MANUAL: 1 %
METAMYELOCYTES NFR BLD MANUAL: 6 %
MONOCYTES # BLD: 0 K/UL (ref 0.1–1.3)
MONOCYTES # BLD: 0.1 K/UL (ref 0.1–1.3)
MONOCYTES # BLD: 0.1 K/UL (ref 0.1–1.3)
MONOCYTES # BLD: 0.2 K/UL (ref 0.1–1.3)
MONOCYTES # BLD: 0.4 K/UL (ref 0.1–1.3)
MONOCYTES # BLD: 0.5 K/UL (ref 0.1–1.3)
MONOCYTES # BLD: 0.6 K/UL (ref 0.1–1.3)
MONOCYTES # BLD: 0.6 K/UL (ref 0.1–1.3)
MONOCYTES # BLD: 0.7 K/UL (ref 0.1–1.3)
MONOCYTES # BLD: 0.9 K/UL (ref 0.1–1.3)
MONOCYTES # BLD: 0.9 K/UL (ref 0.1–1.3)
MONOCYTES # BLD: 1.1 K/UL (ref 0.1–1.3)
MONOCYTES # BLD: 1.2 K/UL (ref 0.1–1.3)
MONOCYTES # BLD: 1.3 K/UL (ref 0.1–1.3)
MONOCYTES # BLD: 1.6 K/UL (ref 0.1–1.3)
MONOCYTES # BLD: 1.7 K/UL (ref 0.1–1.3)
MONOCYTES # BLD: 2.2 K/UL (ref 0.1–1.3)
MONOCYTES # BLD: 3.1 K/UL (ref 0.1–1.3)
MONOCYTES NFR BLD MANUAL: 21 % (ref 3–9)
MONOCYTES NFR BLD MANUAL: 6 % (ref 3–9)
MONOCYTES NFR BLD MANUAL: 7 % (ref 3–9)
MONOCYTES NFR BLD: 1 % (ref 4–12)
MONOCYTES NFR BLD: 10 % (ref 4–12)
MONOCYTES NFR BLD: 12 % (ref 4–12)
MONOCYTES NFR BLD: 12 % (ref 4–12)
MONOCYTES NFR BLD: 16 % (ref 4–12)
MONOCYTES NFR BLD: 19 % (ref 4–12)
MONOCYTES NFR BLD: 2 % (ref 4–12)
MONOCYTES NFR BLD: 20 % (ref 4–12)
MONOCYTES NFR BLD: 20 % (ref 4–12)
MONOCYTES NFR BLD: 26 % (ref 4–12)
MONOCYTES NFR BLD: 3 % (ref 4–12)
MONOCYTES NFR BLD: 3 % (ref 4–12)
MONOCYTES NFR BLD: 34 % (ref 4–12)
MONOCYTES NFR BLD: 4 % (ref 4–12)
MONOCYTES NFR BLD: 6 % (ref 4–12)
MONOCYTES NFR BLD: 7 % (ref 4–12)
MONOCYTES NFR BLD: 9 % (ref 4–12)
MYELOCYTES NFR BLD MANUAL: 3 %
MYELOCYTES NFR BLD MANUAL: 3 %
NEUTS BAND NFR BLD MANUAL: 1 % (ref 0–6)
NEUTS BAND NFR BLD MANUAL: 2 % (ref 0–6)
NEUTS BAND NFR BLD MANUAL: 3 % (ref 0–6)
NEUTS SEG # BLD: 1 K/UL (ref 1.7–8.2)
NEUTS SEG # BLD: 1.7 K/UL (ref 1.7–8.2)
NEUTS SEG # BLD: 10.9 K/UL (ref 1.7–8.2)
NEUTS SEG # BLD: 12.1 K/UL (ref 1.7–8.2)
NEUTS SEG # BLD: 13.7 K/UL (ref 1.7–8.2)
NEUTS SEG # BLD: 2.2 K/UL (ref 1.7–8.2)
NEUTS SEG # BLD: 2.2 K/UL (ref 1.7–8.2)
NEUTS SEG # BLD: 2.3 K/UL (ref 1.7–8.2)
NEUTS SEG # BLD: 2.3 K/UL (ref 1.7–8.2)
NEUTS SEG # BLD: 2.5 K/UL (ref 1.7–8.2)
NEUTS SEG # BLD: 2.5 K/UL (ref 1.7–8.2)
NEUTS SEG # BLD: 2.6 K/UL (ref 1.7–8.2)
NEUTS SEG # BLD: 3.2 K/UL (ref 1.7–8.2)
NEUTS SEG # BLD: 3.6 K/UL (ref 1.7–8.2)
NEUTS SEG # BLD: 3.7 K/UL (ref 1.7–8.2)
NEUTS SEG # BLD: 4.1 K/UL (ref 1.7–8.2)
NEUTS SEG # BLD: 4.4 K/UL (ref 1.7–8.2)
NEUTS SEG # BLD: 5.9 K/UL (ref 1.7–8.2)
NEUTS SEG # BLD: 6 K/UL (ref 1.7–8.2)
NEUTS SEG # BLD: 6.2 K/UL (ref 1.7–8.2)
NEUTS SEG # BLD: 6.3 K/UL (ref 1.7–8.2)
NEUTS SEG # BLD: 7.3 K/UL (ref 1.7–8.2)
NEUTS SEG # BLD: 7.9 K/UL (ref 1.7–8.2)
NEUTS SEG # BLD: 8.4 K/UL (ref 1.7–8.2)
NEUTS SEG NFR BLD MANUAL: 68 % (ref 47–75)
NEUTS SEG NFR BLD MANUAL: 76 % (ref 47–75)
NEUTS SEG NFR BLD MANUAL: 89 % (ref 47–75)
NEUTS SEG NFR BLD MANUAL: 92 % (ref 47–75)
NEUTS SEG NFR BLD: 44 % (ref 43–78)
NEUTS SEG NFR BLD: 52 % (ref 43–78)
NEUTS SEG NFR BLD: 55 % (ref 43–78)
NEUTS SEG NFR BLD: 55 % (ref 43–78)
NEUTS SEG NFR BLD: 63 % (ref 43–78)
NEUTS SEG NFR BLD: 68 % (ref 43–78)
NEUTS SEG NFR BLD: 76 % (ref 43–78)
NEUTS SEG NFR BLD: 79 % (ref 43–78)
NEUTS SEG NFR BLD: 81 % (ref 43–78)
NEUTS SEG NFR BLD: 87 % (ref 43–78)
NEUTS SEG NFR BLD: 88 % (ref 43–78)
NEUTS SEG NFR BLD: 89 % (ref 43–78)
NEUTS SEG NFR BLD: 90 % (ref 43–78)
NEUTS SEG NFR BLD: 90 % (ref 43–78)
NEUTS SEG NFR BLD: 92 % (ref 43–78)
NEUTS SEG NFR BLD: 92 % (ref 43–78)
NEUTS SEG NFR BLD: 93 % (ref 43–78)
NEUTS SEG NFR BLD: 95 % (ref 43–78)
NRBC # BLD: 0 K/UL (ref 0–0.2)
NRBC # BLD: 0.02 K/UL (ref 0–0.2)
NRBC # BLD: 0.03 K/UL (ref 0–0.2)
NRBC # BLD: 0.04 K/UL (ref 0–0.2)
NRBC # BLD: 0.05 K/UL (ref 0–0.2)
NRBC # BLD: 0.07 K/UL (ref 0–0.2)
NRBC # BLD: 0.07 K/UL (ref 0–0.2)
NRBC # BLD: 0.09 K/UL (ref 0–0.2)
NRBC # BLD: 0.13 K/UL (ref 0–0.2)
NRBC # BLD: 0.13 K/UL (ref 0–0.2)
NRBC # BLD: 0.18 K/UL (ref 0–0.2)
NRBC # BLD: 0.38 K/UL (ref 0–0.2)
NRBC # BLD: 0.6 K/UL (ref 0–0.2)
NRBC # BLD: 0.76 K/UL (ref 0–0.2)
NUC CELL # FLD: 0 /CU MM
PLATELET # BLD AUTO: 113 K/UL (ref 150–450)
PLATELET # BLD AUTO: 123 K/UL (ref 150–450)
PLATELET # BLD AUTO: 195 K/UL (ref 150–450)
PLATELET # BLD AUTO: 201 K/UL (ref 150–450)
PLATELET # BLD AUTO: 21 K/UL (ref 150–450)
PLATELET # BLD AUTO: 24 K/UL (ref 150–450)
PLATELET # BLD AUTO: 246 K/UL (ref 150–450)
PLATELET # BLD AUTO: 248 K/UL (ref 150–450)
PLATELET # BLD AUTO: 26 K/UL (ref 150–450)
PLATELET # BLD AUTO: 274 K/UL (ref 150–450)
PLATELET # BLD AUTO: 275 K/UL (ref 150–450)
PLATELET # BLD AUTO: 285 K/UL (ref 150–450)
PLATELET # BLD AUTO: 290 K/UL (ref 150–450)
PLATELET # BLD AUTO: 300 K/UL (ref 150–450)
PLATELET # BLD AUTO: 308 K/UL (ref 150–450)
PLATELET # BLD AUTO: 310 K/UL (ref 150–450)
PLATELET # BLD AUTO: 316 K/UL (ref 150–450)
PLATELET # BLD AUTO: 370 K/UL (ref 150–450)
PLATELET # BLD AUTO: 385 K/UL (ref 150–450)
PLATELET # BLD AUTO: 394 K/UL (ref 150–450)
PLATELET # BLD AUTO: 397 K/UL (ref 150–450)
PLATELET # BLD AUTO: 405 K/UL (ref 150–450)
PLATELET # BLD AUTO: 446 K/UL (ref 150–450)
PLATELET # BLD AUTO: 50 K/UL (ref 150–450)
PLATELET # BLD AUTO: 80 K/UL (ref 150–450)
PLATELET # BLD AUTO: 95 K/UL (ref 150–450)
PLATELET COMMENTS,PCOM: ABNORMAL
PLATELET COMMENTS,PCOM: ADEQUATE
PLATELET COMMENTS,PCOM: SLIGHT
PMV BLD AUTO: 10 FL (ref 9.4–12.3)
PMV BLD AUTO: 10.1 FL (ref 9.4–12.3)
PMV BLD AUTO: 10.3 FL (ref 9.4–12.3)
PMV BLD AUTO: 10.3 FL (ref 9.4–12.3)
PMV BLD AUTO: 10.4 FL (ref 9.4–12.3)
PMV BLD AUTO: 10.5 FL (ref 9.4–12.3)
PMV BLD AUTO: 10.5 FL (ref 9.4–12.3)
PMV BLD AUTO: 10.6 FL (ref 9.4–12.3)
PMV BLD AUTO: 10.8 FL (ref 9.4–12.3)
PMV BLD AUTO: 10.8 FL (ref 9.4–12.3)
PMV BLD AUTO: 11.2 FL (ref 9.4–12.3)
PMV BLD AUTO: 11.2 FL (ref 9.4–12.3)
PMV BLD AUTO: 11.3 FL (ref 9.4–12.3)
PMV BLD AUTO: 11.4 FL (ref 9.4–12.3)
PMV BLD AUTO: 11.5 FL (ref 9.4–12.3)
PMV BLD AUTO: 11.5 FL (ref 9.4–12.3)
PMV BLD AUTO: 11.6 FL (ref 9.4–12.3)
PMV BLD AUTO: 11.9 FL (ref 9.4–12.3)
PMV BLD AUTO: 12.2 FL (ref 9.4–12.3)
PMV BLD AUTO: 12.3 FL (ref 9.4–12.3)
PMV BLD AUTO: 9.6 FL (ref 9.4–12.3)
PMV BLD AUTO: 9.8 FL (ref 9.4–12.3)
PMV BLD AUTO: ABNORMAL FL (ref 9.4–12.3)
POTASSIUM SERPL-SCNC: 3.1 MMOL/L (ref 3.5–5.1)
POTASSIUM SERPL-SCNC: 3.2 MMOL/L (ref 3.5–5.1)
POTASSIUM SERPL-SCNC: 3.2 MMOL/L (ref 3.5–5.1)
POTASSIUM SERPL-SCNC: 3.4 MMOL/L (ref 3.5–5.1)
POTASSIUM SERPL-SCNC: 3.5 MMOL/L (ref 3.5–5.1)
POTASSIUM SERPL-SCNC: 3.6 MMOL/L (ref 3.5–5.1)
POTASSIUM SERPL-SCNC: 3.7 MMOL/L (ref 3.5–5.1)
POTASSIUM SERPL-SCNC: 3.8 MMOL/L (ref 3.5–5.1)
POTASSIUM SERPL-SCNC: 3.8 MMOL/L (ref 3.5–5.1)
POTASSIUM SERPL-SCNC: 3.9 MMOL/L (ref 3.5–5.1)
POTASSIUM SERPL-SCNC: 4 MMOL/L (ref 3.5–5.1)
POTASSIUM SERPL-SCNC: 4.1 MMOL/L (ref 3.5–5.1)
POTASSIUM SERPL-SCNC: 4.2 MMOL/L (ref 3.5–5.1)
POTASSIUM SERPL-SCNC: 4.3 MMOL/L (ref 3.5–5.1)
POTASSIUM SERPL-SCNC: 4.6 MMOL/L (ref 3.5–5.1)
POTASSIUM SERPL-SCNC: 4.6 MMOL/L (ref 3.5–5.1)
PROMYELOCYTES NFR BLD MANUAL: 2 %
PROT CSF-MCNC: 39 MG/DL (ref 15–45)
PROT SERPL-MCNC: 5.9 G/DL (ref 6.3–8.2)
PROT SERPL-MCNC: 6.2 G/DL (ref 6.3–8.2)
PROT SERPL-MCNC: 6.3 G/DL (ref 6.3–8.2)
PROT SERPL-MCNC: 6.4 G/DL (ref 6.3–8.2)
PROT SERPL-MCNC: 6.4 G/DL (ref 6.3–8.2)
PROT SERPL-MCNC: 6.5 G/DL (ref 6.3–8.2)
PROT SERPL-MCNC: 6.6 G/DL (ref 6.3–8.2)
PROT SERPL-MCNC: 6.7 G/DL (ref 6.3–8.2)
PROT SERPL-MCNC: 6.8 G/DL (ref 6.3–8.2)
PROT SERPL-MCNC: 6.9 G/DL (ref 6.3–8.2)
PROT SERPL-MCNC: 7.1 G/DL (ref 6.3–8.2)
PROT SERPL-MCNC: 7.2 G/DL (ref 6.3–8.2)
PROT SERPL-MCNC: 7.4 G/DL (ref 6.3–8.2)
PROT SERPL-MCNC: 7.6 G/DL (ref 6.3–8.2)
PROT SERPL-MCNC: 7.6 G/DL (ref 6.3–8.2)
PROT SERPL-MCNC: 7.8 G/DL (ref 6.3–8.2)
PROT SERPL-MCNC: 7.8 G/DL (ref 6.3–8.2)
RBC # BLD AUTO: 2.44 M/UL (ref 4.05–5.25)
RBC # BLD AUTO: 2.52 M/UL (ref 4.05–5.25)
RBC # BLD AUTO: 2.55 M/UL (ref 4.05–5.2)
RBC # BLD AUTO: 2.66 M/UL (ref 4.05–5.2)
RBC # BLD AUTO: 2.7 M/UL (ref 4.05–5.2)
RBC # BLD AUTO: 2.74 M/UL (ref 4.05–5.2)
RBC # BLD AUTO: 2.74 M/UL (ref 4.05–5.2)
RBC # BLD AUTO: 2.74 M/UL (ref 4.05–5.25)
RBC # BLD AUTO: 2.78 M/UL (ref 4.05–5.2)
RBC # BLD AUTO: 2.87 M/UL (ref 4.05–5.25)
RBC # BLD AUTO: 2.92 M/UL (ref 4.05–5.25)
RBC # BLD AUTO: 2.93 M/UL (ref 4.05–5.2)
RBC # BLD AUTO: 2.96 M/UL (ref 4.05–5.25)
RBC # BLD AUTO: 2.96 M/UL (ref 4.05–5.25)
RBC # BLD AUTO: 3.01 M/UL (ref 4.05–5.2)
RBC # BLD AUTO: 3.05 M/UL (ref 4.05–5.25)
RBC # BLD AUTO: 3.06 M/UL (ref 4.05–5.2)
RBC # BLD AUTO: 3.07 M/UL (ref 4.05–5.2)
RBC # BLD AUTO: 3.09 M/UL (ref 4.05–5.25)
RBC # BLD AUTO: 3.1 M/UL (ref 4.05–5.2)
RBC # BLD AUTO: 3.11 M/UL (ref 4.05–5.25)
RBC # BLD AUTO: 3.23 M/UL (ref 4.05–5.25)
RBC # BLD AUTO: 3.27 M/UL (ref 4.05–5.25)
RBC # BLD AUTO: 3.28 M/UL (ref 4.05–5.25)
RBC # BLD AUTO: 3.3 M/UL (ref 4.05–5.25)
RBC # BLD AUTO: 3.69 M/UL (ref 4.05–5.25)
RBC # FLD: 2650 /CU MM
RBC MORPH BLD: ABNORMAL
SODIUM SERPL-SCNC: 136 MMOL/L (ref 136–145)
SODIUM SERPL-SCNC: 136 MMOL/L (ref 136–145)
SODIUM SERPL-SCNC: 137 MMOL/L (ref 136–145)
SODIUM SERPL-SCNC: 137 MMOL/L (ref 136–145)
SODIUM SERPL-SCNC: 138 MMOL/L (ref 136–145)
SODIUM SERPL-SCNC: 139 MMOL/L (ref 136–145)
SODIUM SERPL-SCNC: 140 MMOL/L (ref 136–145)
SODIUM SERPL-SCNC: 141 MMOL/L (ref 136–145)
SODIUM SERPL-SCNC: 142 MMOL/L (ref 136–145)
SODIUM SERPL-SCNC: 142 MMOL/L (ref 136–145)
SODIUM SERPL-SCNC: 143 MMOL/L (ref 136–145)
SPECIMEN EXP DATE BLD: NORMAL
SPECIMEN SOURCE FLD: NORMAL
SPECIMEN SOURCE: NORMAL
SPECIMEN SOURCE: NORMAL
STATUS OF UNIT,%ST: NORMAL
TEST ORDERED:: NORMAL
TIBC SERPL-MCNC: 197 UG/DL (ref 250–450)
TUBE # CSF: ABNORMAL
TUBE # CSF: NORMAL
UNIT DIVISION, %UDIV: 0
URATE SERPL-MCNC: 3.1 MG/DL (ref 2.6–6)
VIT B12 SERPL-MCNC: >2000 PG/ML (ref 193–986)
WBC # BLD AUTO: 0.1 K/UL (ref 4.3–11.1)
WBC # BLD AUTO: 0.3 K/UL (ref 4.3–11.1)
WBC # BLD AUTO: 10.8 K/UL (ref 4.3–11.1)
WBC # BLD AUTO: 13.4 K/UL (ref 4.3–11.1)
WBC # BLD AUTO: 15.5 K/UL (ref 4.3–11.1)
WBC # BLD AUTO: 15.8 K/UL (ref 4.3–11.1)
WBC # BLD AUTO: 2.2 K/UL (ref 4.3–11.1)
WBC # BLD AUTO: 2.4 K/UL (ref 4.3–11.1)
WBC # BLD AUTO: 2.8 K/UL (ref 4.3–11.1)
WBC # BLD AUTO: 2.8 K/UL (ref 4.3–11.1)
WBC # BLD AUTO: 3.1 K/UL (ref 4.3–11.1)
WBC # BLD AUTO: 3.4 K/UL (ref 4.3–11.1)
WBC # BLD AUTO: 3.4 K/UL (ref 4.3–11.1)
WBC # BLD AUTO: 3.8 K/UL (ref 4.3–11.1)
WBC # BLD AUTO: 3.9 K/UL (ref 4.3–11.1)
WBC # BLD AUTO: 4.1 K/UL (ref 4.3–11.1)
WBC # BLD AUTO: 4.2 K/UL (ref 4.3–11.1)
WBC # BLD AUTO: 4.4 K/UL (ref 4.3–11.1)
WBC # BLD AUTO: 4.4 K/UL (ref 4.3–11.1)
WBC # BLD AUTO: 6.3 K/UL (ref 4.3–11.1)
WBC # BLD AUTO: 6.9 K/UL (ref 4.3–11.1)
WBC # BLD AUTO: 7 K/UL (ref 4.3–11.1)
WBC # BLD AUTO: 7.9 K/UL (ref 4.3–11.1)
WBC # BLD AUTO: 8.3 K/UL (ref 4.3–11.1)
WBC # BLD AUTO: 8.9 K/UL (ref 4.3–11.1)
WBC # BLD AUTO: 9.4 K/UL (ref 4.3–11.1)
WBC MORPH BLD: ABNORMAL
WBC MORPH BLD: SLIGHT

## 2020-01-01 PROCEDURE — 80053 COMPREHEN METABOLIC PANEL: CPT

## 2020-01-01 PROCEDURE — 74011250636 HC RX REV CODE- 250/636: Performed by: INTERNAL MEDICINE

## 2020-01-01 PROCEDURE — 99231 SBSQ HOSP IP/OBS SF/LOW 25: CPT | Performed by: INTERNAL MEDICINE

## 2020-01-01 PROCEDURE — 65270000029 HC RM PRIVATE

## 2020-01-01 PROCEDURE — 96409 CHEMO IV PUSH SNGL DRUG: CPT

## 2020-01-01 PROCEDURE — 2709999900 HC NON-CHARGEABLE SUPPLY

## 2020-01-01 PROCEDURE — P9037 PLATE PHERES LEUKOREDU IRRAD: HCPCS

## 2020-01-01 PROCEDURE — 80074 ACUTE HEPATITIS PANEL: CPT

## 2020-01-01 PROCEDURE — 86900 BLOOD TYPING SEROLOGIC ABO: CPT

## 2020-01-01 PROCEDURE — 77030036720 HC NDL CORE BIOP MISSION DISP BARD -B

## 2020-01-01 PROCEDURE — A9552 F18 FDG: HCPCS

## 2020-01-01 PROCEDURE — 74011250636 HC RX REV CODE- 250/636: Performed by: RADIOLOGY

## 2020-01-01 PROCEDURE — 83735 ASSAY OF MAGNESIUM: CPT

## 2020-01-01 PROCEDURE — 71045 X-RAY EXAM CHEST 1 VIEW: CPT

## 2020-01-01 PROCEDURE — 82607 VITAMIN B-12: CPT

## 2020-01-01 PROCEDURE — 74011250637 HC RX REV CODE- 250/637: Performed by: INTERNAL MEDICINE

## 2020-01-01 PROCEDURE — 74011000250 HC RX REV CODE- 250: Performed by: INTERNAL MEDICINE

## 2020-01-01 PROCEDURE — 96361 HYDRATE IV INFUSION ADD-ON: CPT

## 2020-01-01 PROCEDURE — 85025 COMPLETE CBC W/AUTO DIFF WBC: CPT

## 2020-01-01 PROCEDURE — 36591 DRAW BLOOD OFF VENOUS DEVICE: CPT

## 2020-01-01 PROCEDURE — 99223 1ST HOSP IP/OBS HIGH 75: CPT | Performed by: INTERNAL MEDICINE

## 2020-01-01 PROCEDURE — 74011000258 HC RX REV CODE- 258: Performed by: INTERNAL MEDICINE

## 2020-01-01 PROCEDURE — 36430 TRANSFUSION BLD/BLD COMPNT: CPT

## 2020-01-01 PROCEDURE — 83615 LACTATE (LD) (LDH) ENZYME: CPT

## 2020-01-01 PROCEDURE — 88307 TISSUE EXAM BY PATHOLOGIST: CPT

## 2020-01-01 PROCEDURE — 86644 CMV ANTIBODY: CPT

## 2020-01-01 PROCEDURE — 77030010507 HC ADH SKN DERMBND J&J -B

## 2020-01-01 PROCEDURE — 99232 SBSQ HOSP IP/OBS MODERATE 35: CPT | Performed by: INTERNAL MEDICINE

## 2020-01-01 PROCEDURE — 74011250637 HC RX REV CODE- 250/637: Performed by: NURSE PRACTITIONER

## 2020-01-01 PROCEDURE — 96372 THER/PROPH/DIAG INJ SC/IM: CPT

## 2020-01-01 PROCEDURE — 74011250636 HC RX REV CODE- 250/636: Performed by: NURSE PRACTITIONER

## 2020-01-01 PROCEDURE — 96366 THER/PROPH/DIAG IV INF ADDON: CPT

## 2020-01-01 PROCEDURE — C1751 CATH, INF, PER/CENT/MIDLINE: HCPCS

## 2020-01-01 PROCEDURE — 77030014143 HC TY PUNC LUMBR BD -A

## 2020-01-01 PROCEDURE — 77030018667 ADMN ST IV BLD FENW -A

## 2020-01-01 PROCEDURE — 99239 HOSP IP/OBS DSCHRG MGMT >30: CPT | Performed by: INTERNAL MEDICINE

## 2020-01-01 PROCEDURE — 3E03305 INTRODUCTION OF OTHER ANTINEOPLASTIC INTO PERIPHERAL VEIN, PERCUTANEOUS APPROACH: ICD-10-PCS | Performed by: INTERNAL MEDICINE

## 2020-01-01 PROCEDURE — 74011000636 HC RX REV CODE- 636: Performed by: RADIOLOGY

## 2020-01-01 PROCEDURE — 30233N1 TRANSFUSION OF NONAUTOLOGOUS RED BLOOD CELLS INTO PERIPHERAL VEIN, PERCUTANEOUS APPROACH: ICD-10-PCS | Performed by: INTERNAL MEDICINE

## 2020-01-01 PROCEDURE — 77030031139 HC SUT VCRL2 J&J -A

## 2020-01-01 PROCEDURE — 88185 FLOWCYTOMETRY/TC ADD-ON: CPT

## 2020-01-01 PROCEDURE — C1788 PORT, INDWELLING, IMP: HCPCS

## 2020-01-01 PROCEDURE — 99233 SBSQ HOSP IP/OBS HIGH 50: CPT | Performed by: INTERNAL MEDICINE

## 2020-01-01 PROCEDURE — 83540 ASSAY OF IRON: CPT

## 2020-01-01 PROCEDURE — C1894 INTRO/SHEATH, NON-LASER: HCPCS

## 2020-01-01 PROCEDURE — 77030003666 HC NDL SPINAL BD -A

## 2020-01-01 PROCEDURE — 77030018719 HC DRSG PTCH ANTIMIC J&J -A

## 2020-01-01 PROCEDURE — 36592 COLLECT BLOOD FROM PICC: CPT

## 2020-01-01 PROCEDURE — 74177 CT ABD & PELVIS W/CONTRAST: CPT

## 2020-01-01 PROCEDURE — 96367 TX/PROPH/DG ADDL SEQ IV INF: CPT

## 2020-01-01 PROCEDURE — 96415 CHEMO IV INFUSION ADDL HR: CPT

## 2020-01-01 PROCEDURE — 74011000636 HC RX REV CODE- 636: Performed by: INTERNAL MEDICINE

## 2020-01-01 PROCEDURE — 77030002916 HC SUT ETHLN J&J -A

## 2020-01-01 PROCEDURE — 74011000250 HC RX REV CODE- 250: Performed by: RADIOLOGY

## 2020-01-01 PROCEDURE — 74011000250 HC RX REV CODE- 250: Performed by: NURSE PRACTITIONER

## 2020-01-01 PROCEDURE — P9040 RBC LEUKOREDUCED IRRADIATED: HCPCS

## 2020-01-01 PROCEDURE — 96450 CHEMOTHERAPY INTO CNS: CPT

## 2020-01-01 PROCEDURE — 02HV33Z INSERTION OF INFUSION DEVICE INTO SUPERIOR VENA CAVA, PERCUTANEOUS APPROACH: ICD-10-PCS | Performed by: INTERNAL MEDICINE

## 2020-01-01 PROCEDURE — 82746 ASSAY OF FOLIC ACID SERUM: CPT

## 2020-01-01 PROCEDURE — 36415 COLL VENOUS BLD VENIPUNCTURE: CPT

## 2020-01-01 PROCEDURE — 36593 DECLOT VASCULAR DEVICE: CPT

## 2020-01-01 PROCEDURE — 62272 THER SPI PNXR DRG CSF: CPT

## 2020-01-01 PROCEDURE — 96411 CHEMO IV PUSH ADDL DRUG: CPT

## 2020-01-01 PROCEDURE — 96375 TX/PRO/DX INJ NEW DRUG ADDON: CPT

## 2020-01-01 PROCEDURE — 84157 ASSAY OF PROTEIN OTHER: CPT

## 2020-01-01 PROCEDURE — 99153 MOD SED SAME PHYS/QHP EA: CPT

## 2020-01-01 PROCEDURE — 36005 INJECTION EXT VENOGRAPHY: CPT

## 2020-01-01 PROCEDURE — 36573 INSJ PICC RS&I 5 YR+: CPT | Performed by: INTERNAL MEDICINE

## 2020-01-01 PROCEDURE — 77030031131 HC SUT MXN P COVD -B

## 2020-01-01 PROCEDURE — 96413 CHEMO IV INFUSION 1 HR: CPT

## 2020-01-01 PROCEDURE — 82945 GLUCOSE OTHER FLUID: CPT

## 2020-01-01 PROCEDURE — P9016 RBC LEUKOCYTES REDUCED: HCPCS

## 2020-01-01 PROCEDURE — 74011000636 HC RX REV CODE- 636: Performed by: PHYSICIAN ASSISTANT

## 2020-01-01 PROCEDURE — 84550 ASSAY OF BLOOD/URIC ACID: CPT

## 2020-01-01 PROCEDURE — 36584 COMPL RPLCMT PICC RS&I: CPT

## 2020-01-01 PROCEDURE — 74011000250 HC RX REV CODE- 250: Performed by: PHYSICIAN ASSISTANT

## 2020-01-01 PROCEDURE — 96360 HYDRATION IV INFUSION INIT: CPT

## 2020-01-01 PROCEDURE — 89050 BODY FLUID CELL COUNT: CPT

## 2020-01-01 PROCEDURE — 82728 ASSAY OF FERRITIN: CPT

## 2020-01-01 PROCEDURE — 99152 MOD SED SAME PHYS/QHP 5/>YRS: CPT

## 2020-01-01 PROCEDURE — 93306 TTE W/DOPPLER COMPLETE: CPT

## 2020-01-01 PROCEDURE — 96417 CHEMO IV INFUS EACH ADDL SEQ: CPT

## 2020-01-01 PROCEDURE — 86923 COMPATIBILITY TEST ELECTRIC: CPT

## 2020-01-01 PROCEDURE — 87389 HIV-1 AG W/HIV-1&-2 AB AG IA: CPT

## 2020-01-01 PROCEDURE — 88184 FLOWCYTOMETRY/ TC 1 MARKER: CPT

## 2020-01-01 PROCEDURE — 47000 NEEDLE BIOPSY OF LIVER PERQ: CPT

## 2020-01-01 PROCEDURE — 36598 INJ W/FLUOR EVAL CV DEVICE: CPT

## 2020-01-01 RX ORDER — EPINEPHRINE 1 MG/ML
0.3 INJECTION, SOLUTION, CONCENTRATE INTRAVENOUS AS NEEDED
Status: ACTIVE | OUTPATIENT
Start: 2020-01-01 | End: 2020-01-01

## 2020-01-01 RX ORDER — HEPARIN SODIUM (PORCINE) LOCK FLUSH IV SOLN 100 UNIT/ML 100 UNIT/ML
500 SOLUTION INTRAVENOUS ONCE
Status: DISCONTINUED | OUTPATIENT
Start: 2020-01-01 | End: 2020-01-01

## 2020-01-01 RX ORDER — LANOLIN ALCOHOL/MO/W.PET/CERES
400 CREAM (GRAM) TOPICAL 3 TIMES DAILY
Status: DISCONTINUED | OUTPATIENT
Start: 2020-01-01 | End: 2020-01-01 | Stop reason: HOSPADM

## 2020-01-01 RX ORDER — MELOXICAM 7.5 MG/1
15 TABLET ORAL DAILY
Status: DISCONTINUED | OUTPATIENT
Start: 2020-01-01 | End: 2020-01-01 | Stop reason: HOSPADM

## 2020-01-01 RX ORDER — MORPHINE SULFATE 2 MG/ML
2 INJECTION, SOLUTION INTRAMUSCULAR; INTRAVENOUS
Status: DISCONTINUED | OUTPATIENT
Start: 2020-01-01 | End: 2020-01-01

## 2020-01-01 RX ORDER — SODIUM CHLORIDE 9 MG/ML
250 INJECTION, SOLUTION INTRAVENOUS AS NEEDED
Status: DISCONTINUED | OUTPATIENT
Start: 2020-01-01 | End: 2020-01-01 | Stop reason: HOSPADM

## 2020-01-01 RX ORDER — DEXAMETHASONE SODIUM PHOSPHATE 10 MG/ML
10 INJECTION INTRAMUSCULAR; INTRAVENOUS EVERY 24 HOURS
Status: COMPLETED | OUTPATIENT
Start: 2020-01-01 | End: 2020-01-01

## 2020-01-01 RX ORDER — HEPARIN 100 UNIT/ML
500 SYRINGE INTRAVENOUS ONCE
Status: ACTIVE | OUTPATIENT
Start: 2020-01-01 | End: 2020-01-01

## 2020-01-01 RX ORDER — ONDANSETRON 2 MG/ML
8 INJECTION INTRAMUSCULAR; INTRAVENOUS AS NEEDED
Status: DISCONTINUED | OUTPATIENT
Start: 2020-01-01 | End: 2020-01-01 | Stop reason: HOSPADM

## 2020-01-01 RX ORDER — HYDROCORTISONE SODIUM SUCCINATE 100 MG/2ML
100 INJECTION, POWDER, FOR SOLUTION INTRAMUSCULAR; INTRAVENOUS AS NEEDED
Status: ACTIVE | OUTPATIENT
Start: 2020-01-01 | End: 2020-01-01

## 2020-01-01 RX ORDER — ACETAMINOPHEN 325 MG/1
650 TABLET ORAL ONCE
Status: COMPLETED | OUTPATIENT
Start: 2020-01-01 | End: 2020-01-01

## 2020-01-01 RX ORDER — HEPARIN 100 UNIT/ML
300 SYRINGE INTRAVENOUS AS NEEDED
Status: DISCONTINUED | OUTPATIENT
Start: 2020-01-01 | End: 2020-01-01 | Stop reason: HOSPADM

## 2020-01-01 RX ORDER — LIDOCAINE AND PRILOCAINE 25; 25 MG/G; MG/G
CREAM TOPICAL AS NEEDED
Status: DISCONTINUED | OUTPATIENT
Start: 2020-01-01 | End: 2020-01-01 | Stop reason: HOSPADM

## 2020-01-01 RX ORDER — DIPHENHYDRAMINE HYDROCHLORIDE 50 MG/ML
50 INJECTION, SOLUTION INTRAMUSCULAR; INTRAVENOUS AS NEEDED
Status: DISCONTINUED | OUTPATIENT
Start: 2020-01-01 | End: 2020-01-01 | Stop reason: HOSPADM

## 2020-01-01 RX ORDER — SODIUM CHLORIDE 9 MG/ML
25 INJECTION, SOLUTION INTRAVENOUS CONTINUOUS
Status: DISCONTINUED | OUTPATIENT
Start: 2020-01-01 | End: 2020-01-01 | Stop reason: HOSPADM

## 2020-01-01 RX ORDER — SODIUM CHLORIDE 0.9 % (FLUSH) 0.9 %
10 SYRINGE (ML) INJECTION AS NEEDED
Status: ACTIVE | OUTPATIENT
Start: 2020-01-01 | End: 2020-01-01

## 2020-01-01 RX ORDER — PANTOPRAZOLE SODIUM 40 MG/1
40 TABLET, DELAYED RELEASE ORAL DAILY
Status: DISCONTINUED | OUTPATIENT
Start: 2020-01-01 | End: 2020-01-01 | Stop reason: HOSPADM

## 2020-01-01 RX ORDER — LANOLIN ALCOHOL/MO/W.PET/CERES
400 CREAM (GRAM) TOPICAL 3 TIMES DAILY
Status: DISCONTINUED | OUTPATIENT
Start: 2020-01-01 | End: 2020-01-01

## 2020-01-01 RX ORDER — DIPHENHYDRAMINE HYDROCHLORIDE 50 MG/ML
50 INJECTION, SOLUTION INTRAMUSCULAR; INTRAVENOUS
Status: ACTIVE | OUTPATIENT
Start: 2020-01-01 | End: 2020-01-01

## 2020-01-01 RX ORDER — SODIUM CHLORIDE 0.9 % (FLUSH) 0.9 %
10 SYRINGE (ML) INJECTION EVERY 8 HOURS
Status: DISCONTINUED | OUTPATIENT
Start: 2020-01-01 | End: 2020-01-01 | Stop reason: HOSPADM

## 2020-01-01 RX ORDER — LIDOCAINE HYDROCHLORIDE 20 MG/ML
2-10 INJECTION, SOLUTION INFILTRATION; PERINEURAL ONCE
Status: COMPLETED | OUTPATIENT
Start: 2020-01-01 | End: 2020-01-01

## 2020-01-01 RX ORDER — DEXAMETHASONE SODIUM PHOSPHATE 100 MG/10ML
10 INJECTION INTRAMUSCULAR; INTRAVENOUS ONCE
Status: COMPLETED | OUTPATIENT
Start: 2020-01-01 | End: 2020-01-01

## 2020-01-01 RX ORDER — CEFAZOLIN SODIUM/WATER 2 G/20 ML
2 SYRINGE (ML) INTRAVENOUS
Status: DISCONTINUED | OUTPATIENT
Start: 2020-01-01 | End: 2020-01-01

## 2020-01-01 RX ORDER — ALLOPURINOL 300 MG/1
300 TABLET ORAL DAILY
Status: DISCONTINUED | OUTPATIENT
Start: 2020-01-01 | End: 2020-01-01 | Stop reason: HOSPADM

## 2020-01-01 RX ORDER — POTASSIUM CHLORIDE 29.8 MG/ML
20 INJECTION INTRAVENOUS ONCE
Status: COMPLETED | OUTPATIENT
Start: 2020-01-01 | End: 2020-01-01

## 2020-01-01 RX ORDER — ONDANSETRON 2 MG/ML
4 INJECTION INTRAMUSCULAR; INTRAVENOUS
Status: DISCONTINUED | OUTPATIENT
Start: 2020-01-01 | End: 2020-01-01

## 2020-01-01 RX ORDER — DIPHENHYDRAMINE HYDROCHLORIDE 50 MG/ML
25 INJECTION, SOLUTION INTRAMUSCULAR; INTRAVENOUS
Status: DISCONTINUED | OUTPATIENT
Start: 2020-01-01 | End: 2020-01-01 | Stop reason: HOSPADM

## 2020-01-01 RX ORDER — DIPHENHYDRAMINE HYDROCHLORIDE 50 MG/ML
12.5-5 INJECTION, SOLUTION INTRAMUSCULAR; INTRAVENOUS ONCE
Status: DISCONTINUED | OUTPATIENT
Start: 2020-01-01 | End: 2020-01-01

## 2020-01-01 RX ORDER — AMLODIPINE BESYLATE 10 MG/1
10 TABLET ORAL DAILY
Status: DISCONTINUED | OUTPATIENT
Start: 2020-01-01 | End: 2020-01-01 | Stop reason: HOSPADM

## 2020-01-01 RX ORDER — DIPHENHYDRAMINE HYDROCHLORIDE 50 MG/ML
25 INJECTION, SOLUTION INTRAMUSCULAR; INTRAVENOUS AS NEEDED
Status: DISCONTINUED | OUTPATIENT
Start: 2020-01-01 | End: 2020-01-01 | Stop reason: HOSPADM

## 2020-01-01 RX ORDER — HYDROCODONE BITARTRATE AND ACETAMINOPHEN 5; 325 MG/1; MG/1
1 TABLET ORAL
Status: DISCONTINUED | OUTPATIENT
Start: 2020-01-01 | End: 2020-01-01

## 2020-01-01 RX ORDER — LIDOCAINE HYDROCHLORIDE 20 MG/ML
2-20 INJECTION, SOLUTION INFILTRATION; PERINEURAL
Status: DISCONTINUED | OUTPATIENT
Start: 2020-01-01 | End: 2020-01-01 | Stop reason: HOSPADM

## 2020-01-01 RX ORDER — SODIUM CHLORIDE 0.9 % (FLUSH) 0.9 %
10 SYRINGE (ML) INJECTION AS NEEDED
Status: DISCONTINUED | OUTPATIENT
Start: 2020-01-01 | End: 2020-01-01 | Stop reason: HOSPADM

## 2020-01-01 RX ORDER — ALBUTEROL SULFATE 0.83 MG/ML
2.5 SOLUTION RESPIRATORY (INHALATION) AS NEEDED
Status: ACTIVE | OUTPATIENT
Start: 2020-01-01 | End: 2020-01-01

## 2020-01-01 RX ORDER — DIPHENHYDRAMINE HYDROCHLORIDE 50 MG/ML
50 INJECTION, SOLUTION INTRAMUSCULAR; INTRAVENOUS
Status: DISCONTINUED | OUTPATIENT
Start: 2020-01-01 | End: 2020-01-01 | Stop reason: HOSPADM

## 2020-01-01 RX ORDER — MIDAZOLAM HYDROCHLORIDE 1 MG/ML
.5-2 INJECTION, SOLUTION INTRAMUSCULAR; INTRAVENOUS
Status: DISCONTINUED | OUTPATIENT
Start: 2020-01-01 | End: 2020-01-01 | Stop reason: HOSPADM

## 2020-01-01 RX ORDER — FLUCONAZOLE 100 MG/1
400 TABLET ORAL DAILY
Status: DISCONTINUED | OUTPATIENT
Start: 2020-01-01 | End: 2020-01-01 | Stop reason: HOSPADM

## 2020-01-01 RX ORDER — SODIUM CHLORIDE 0.9 % (FLUSH) 0.9 %
20 SYRINGE (ML) INJECTION EVERY 8 HOURS
Status: DISCONTINUED | OUTPATIENT
Start: 2020-01-01 | End: 2020-01-01 | Stop reason: HOSPADM

## 2020-01-01 RX ORDER — ALBUTEROL SULFATE 0.83 MG/ML
2.5 SOLUTION RESPIRATORY (INHALATION) AS NEEDED
Status: DISCONTINUED | OUTPATIENT
Start: 2020-01-01 | End: 2020-01-01 | Stop reason: HOSPADM

## 2020-01-01 RX ORDER — ACYCLOVIR 200 MG/1
400 CAPSULE ORAL 2 TIMES DAILY
Qty: 120 CAP | Refills: 0 | Status: SHIPPED | OUTPATIENT
Start: 2020-01-01 | End: 2020-01-01

## 2020-01-01 RX ORDER — ONDANSETRON 2 MG/ML
8 INJECTION INTRAMUSCULAR; INTRAVENOUS EVERY 8 HOURS
Status: DISCONTINUED | OUTPATIENT
Start: 2020-01-01 | End: 2020-01-01 | Stop reason: HOSPADM

## 2020-01-01 RX ORDER — ONDANSETRON 2 MG/ML
8 INJECTION INTRAMUSCULAR; INTRAVENOUS ONCE
Status: COMPLETED | OUTPATIENT
Start: 2020-01-01 | End: 2020-01-01

## 2020-01-01 RX ORDER — LIDOCAINE AND PRILOCAINE 25; 25 MG/G; MG/G
CREAM TOPICAL AS NEEDED
Status: DISCONTINUED | OUTPATIENT
Start: 2020-01-01 | End: 2020-01-01

## 2020-01-01 RX ORDER — FUROSEMIDE 10 MG/ML
20 INJECTION INTRAMUSCULAR; INTRAVENOUS ONCE
Status: COMPLETED | OUTPATIENT
Start: 2020-01-01 | End: 2020-01-01

## 2020-01-01 RX ORDER — FLUCONAZOLE 200 MG/1
400 TABLET ORAL DAILY
Qty: 60 TAB | Refills: 0 | Status: SHIPPED | OUTPATIENT
Start: 2020-01-01 | End: 2020-01-01

## 2020-01-01 RX ORDER — PROMETHAZINE HYDROCHLORIDE 25 MG/1
25 TABLET ORAL
Status: DISCONTINUED | OUTPATIENT
Start: 2020-01-01 | End: 2020-01-01 | Stop reason: HOSPADM

## 2020-01-01 RX ORDER — HEPARIN SODIUM (PORCINE) LOCK FLUSH IV SOLN 100 UNIT/ML 100 UNIT/ML
500 SOLUTION INTRAVENOUS AS NEEDED
Status: DISCONTINUED | OUTPATIENT
Start: 2020-01-01 | End: 2020-01-01 | Stop reason: HOSPADM

## 2020-01-01 RX ORDER — ONDANSETRON 2 MG/ML
8 INJECTION INTRAMUSCULAR; INTRAVENOUS AS NEEDED
Status: ACTIVE | OUTPATIENT
Start: 2020-01-01 | End: 2020-01-01

## 2020-01-01 RX ORDER — SODIUM CHLORIDE 9 MG/ML
25 INJECTION, SOLUTION INTRAVENOUS CONTINUOUS
Status: DISCONTINUED | OUTPATIENT
Start: 2020-01-01 | End: 2020-01-01

## 2020-01-01 RX ORDER — HYDROCORTISONE SODIUM SUCCINATE 100 MG/2ML
100 INJECTION, POWDER, FOR SOLUTION INTRAMUSCULAR; INTRAVENOUS AS NEEDED
Status: DISCONTINUED | OUTPATIENT
Start: 2020-01-01 | End: 2020-01-01 | Stop reason: HOSPADM

## 2020-01-01 RX ORDER — POTASSIUM CHLORIDE 20 MEQ/1
20 TABLET, EXTENDED RELEASE ORAL 2 TIMES DAILY
Status: DISCONTINUED | OUTPATIENT
Start: 2020-01-01 | End: 2020-01-01 | Stop reason: HOSPADM

## 2020-01-01 RX ORDER — FOLIC ACID 1 MG/1
1 TABLET ORAL DAILY
Status: DISCONTINUED | OUTPATIENT
Start: 2020-01-01 | End: 2020-01-01 | Stop reason: HOSPADM

## 2020-01-01 RX ORDER — DIPHENHYDRAMINE HYDROCHLORIDE 50 MG/ML
50 INJECTION, SOLUTION INTRAMUSCULAR; INTRAVENOUS ONCE
Status: COMPLETED | OUTPATIENT
Start: 2020-01-01 | End: 2020-01-01

## 2020-01-01 RX ORDER — ACYCLOVIR 200 MG/1
400 CAPSULE ORAL 2 TIMES DAILY
Status: DISCONTINUED | OUTPATIENT
Start: 2020-01-01 | End: 2020-01-01 | Stop reason: HOSPADM

## 2020-01-01 RX ORDER — ACETAMINOPHEN 325 MG/1
650 TABLET ORAL AS NEEDED
Status: DISCONTINUED | OUTPATIENT
Start: 2020-01-01 | End: 2020-01-01 | Stop reason: HOSPADM

## 2020-01-01 RX ORDER — ONDANSETRON 2 MG/ML
8 INJECTION INTRAMUSCULAR; INTRAVENOUS EVERY 8 HOURS
Status: DISPENSED | OUTPATIENT
Start: 2020-01-01 | End: 2020-01-01

## 2020-01-01 RX ORDER — ACETAMINOPHEN 325 MG/1
650 TABLET ORAL AS NEEDED
Status: ACTIVE | OUTPATIENT
Start: 2020-01-01 | End: 2020-01-01

## 2020-01-01 RX ORDER — LIDOCAINE HYDROCHLORIDE 20 MG/ML
40-120 INJECTION, SOLUTION INFILTRATION; PERINEURAL ONCE
Status: COMPLETED | OUTPATIENT
Start: 2020-01-01 | End: 2020-01-01

## 2020-01-01 RX ORDER — POTASSIUM CHLORIDE 20 MEQ/1
20 TABLET, EXTENDED RELEASE ORAL 2 TIMES DAILY
Status: DISCONTINUED | OUTPATIENT
Start: 2020-01-01 | End: 2020-01-01

## 2020-01-01 RX ORDER — HEPARIN 100 UNIT/ML
300-500 SYRINGE INTRAVENOUS AS NEEDED
Status: DISPENSED | OUTPATIENT
Start: 2020-01-01 | End: 2020-01-01

## 2020-01-01 RX ORDER — SODIUM CHLORIDE 0.9 % (FLUSH) 0.9 %
10 SYRINGE (ML) INJECTION ONCE
Status: COMPLETED | OUTPATIENT
Start: 2020-01-01 | End: 2020-01-01

## 2020-01-01 RX ORDER — DIPHENHYDRAMINE HYDROCHLORIDE 50 MG/ML
25 INJECTION, SOLUTION INTRAMUSCULAR; INTRAVENOUS AS NEEDED
Status: ACTIVE | OUTPATIENT
Start: 2020-01-01 | End: 2020-01-01

## 2020-01-01 RX ORDER — ALUMINA, MAGNESIA, AND SIMETHICONE 2400; 2400; 240 MG/30ML; MG/30ML; MG/30ML
30 SUSPENSION ORAL
Status: DISCONTINUED | OUTPATIENT
Start: 2020-01-01 | End: 2020-01-01 | Stop reason: CLARIF

## 2020-01-01 RX ORDER — DEXAMETHASONE SODIUM PHOSPHATE 10 MG/ML
10 INJECTION INTRAMUSCULAR; INTRAVENOUS ONCE
Status: DISCONTINUED | OUTPATIENT
Start: 2020-01-01 | End: 2020-01-01

## 2020-01-01 RX ORDER — LORAZEPAM 2 MG/ML
0.5 INJECTION INTRAMUSCULAR
Status: DISCONTINUED | OUTPATIENT
Start: 2020-01-01 | End: 2020-01-01 | Stop reason: HOSPADM

## 2020-01-01 RX ORDER — FOLIC ACID 1 MG/1
1 TABLET ORAL DAILY
Status: DISCONTINUED | OUTPATIENT
Start: 2020-01-01 | End: 2020-01-01

## 2020-01-01 RX ORDER — DIPHENHYDRAMINE HCL 25 MG
25 CAPSULE ORAL ONCE
Status: COMPLETED | OUTPATIENT
Start: 2020-01-01 | End: 2020-01-01

## 2020-01-01 RX ORDER — SODIUM CHLORIDE 9 MG/ML
1000 INJECTION, SOLUTION INTRAVENOUS CONTINUOUS
Status: ACTIVE | OUTPATIENT
Start: 2020-01-01 | End: 2020-01-01

## 2020-01-01 RX ORDER — MAG HYDROX/ALUMINUM HYD/SIMETH 200-200-20
30 SUSPENSION, ORAL (FINAL DOSE FORM) ORAL
Status: DISCONTINUED | OUTPATIENT
Start: 2020-01-01 | End: 2020-01-01 | Stop reason: HOSPADM

## 2020-01-01 RX ORDER — SODIUM CHLORIDE 9 MG/ML
25 INJECTION, SOLUTION INTRAVENOUS CONTINUOUS
Status: ACTIVE | OUTPATIENT
Start: 2020-01-01 | End: 2020-01-01

## 2020-01-01 RX ORDER — SODIUM CHLORIDE 0.9 % (FLUSH) 0.9 %
10-40 SYRINGE (ML) INJECTION AS NEEDED
Status: DISCONTINUED | OUTPATIENT
Start: 2020-01-01 | End: 2020-01-01 | Stop reason: HOSPADM

## 2020-01-01 RX ORDER — ENOXAPARIN SODIUM 100 MG/ML
40 INJECTION SUBCUTANEOUS EVERY 24 HOURS
Status: DISCONTINUED | OUTPATIENT
Start: 2020-01-01 | End: 2020-01-01 | Stop reason: HOSPADM

## 2020-01-01 RX ORDER — LIDOCAINE HYDROCHLORIDE 20 MG/ML
1-20 INJECTION, SOLUTION INFILTRATION; PERINEURAL ONCE
Status: COMPLETED | OUTPATIENT
Start: 2020-01-01 | End: 2020-01-01

## 2020-01-01 RX ORDER — SODIUM CHLORIDE 9 MG/ML
75 INJECTION, SOLUTION INTRAVENOUS CONTINUOUS
Status: DISCONTINUED | OUTPATIENT
Start: 2020-01-01 | End: 2020-01-01 | Stop reason: HOSPADM

## 2020-01-01 RX ORDER — ONDANSETRON 8 MG/1
8 TABLET, ORALLY DISINTEGRATING ORAL
Status: DISCONTINUED | OUTPATIENT
Start: 2020-01-01 | End: 2020-01-01 | Stop reason: HOSPADM

## 2020-01-01 RX ORDER — DIPHENHYDRAMINE HYDROCHLORIDE 50 MG/ML
12.5-5 INJECTION, SOLUTION INTRAMUSCULAR; INTRAVENOUS ONCE
Status: COMPLETED | OUTPATIENT
Start: 2020-01-01 | End: 2020-01-01

## 2020-01-01 RX ORDER — ALLOPURINOL 300 MG/1
300 TABLET ORAL 2 TIMES DAILY
Status: DISCONTINUED | OUTPATIENT
Start: 2020-01-01 | End: 2020-01-01

## 2020-01-01 RX ORDER — DIPHENHYDRAMINE HYDROCHLORIDE 50 MG/ML
50 INJECTION, SOLUTION INTRAMUSCULAR; INTRAVENOUS EVERY 24 HOURS
Status: DISCONTINUED | OUTPATIENT
Start: 2020-01-01 | End: 2020-01-01 | Stop reason: HOSPADM

## 2020-01-01 RX ORDER — MORPHINE SULFATE 2 MG/ML
2 INJECTION, SOLUTION INTRAMUSCULAR; INTRAVENOUS
Status: DISCONTINUED | OUTPATIENT
Start: 2020-01-01 | End: 2020-01-01 | Stop reason: HOSPADM

## 2020-01-01 RX ORDER — DIPHENHYDRAMINE HYDROCHLORIDE 50 MG/ML
50 INJECTION, SOLUTION INTRAMUSCULAR; INTRAVENOUS
Status: COMPLETED | OUTPATIENT
Start: 2020-01-01 | End: 2020-01-01

## 2020-01-01 RX ORDER — SODIUM CHLORIDE 0.9 % (FLUSH) 0.9 %
10 SYRINGE (ML) INJECTION
Status: COMPLETED | OUTPATIENT
Start: 2020-01-01 | End: 2020-01-01

## 2020-01-01 RX ORDER — LIDOCAINE HYDROCHLORIDE 20 MG/ML
0.2 INJECTION, SOLUTION INFILTRATION; PERINEURAL ONCE
Status: COMPLETED | OUTPATIENT
Start: 2020-01-01 | End: 2020-01-01

## 2020-01-01 RX ORDER — SODIUM CHLORIDE 9 MG/ML
75 INJECTION, SOLUTION INTRAVENOUS CONTINUOUS
Status: DISCONTINUED | OUTPATIENT
Start: 2020-01-01 | End: 2020-01-01

## 2020-01-01 RX ORDER — MELOXICAM 7.5 MG/1
15 TABLET ORAL DAILY
Status: DISCONTINUED | OUTPATIENT
Start: 2020-01-01 | End: 2020-01-01

## 2020-01-01 RX ORDER — ALLOPURINOL 300 MG/1
300 TABLET ORAL 2 TIMES DAILY
Qty: 60 TAB | Refills: 0 | Status: SHIPPED | OUTPATIENT
Start: 2020-01-01

## 2020-01-01 RX ORDER — AMLODIPINE BESYLATE 10 MG/1
10 TABLET ORAL DAILY
Status: DISCONTINUED | OUTPATIENT
Start: 2020-01-01 | End: 2020-01-01

## 2020-01-01 RX ORDER — FENTANYL CITRATE 50 UG/ML
25-50 INJECTION, SOLUTION INTRAMUSCULAR; INTRAVENOUS
Status: DISCONTINUED | OUTPATIENT
Start: 2020-01-01 | End: 2020-01-01 | Stop reason: HOSPADM

## 2020-01-01 RX ORDER — ACYCLOVIR 200 MG/1
400 CAPSULE ORAL 2 TIMES DAILY
Status: DISCONTINUED | OUTPATIENT
Start: 2020-01-01 | End: 2020-01-01

## 2020-01-01 RX ORDER — ONDANSETRON 2 MG/ML
4 INJECTION INTRAMUSCULAR; INTRAVENOUS
Status: DISCONTINUED | OUTPATIENT
Start: 2020-01-01 | End: 2020-01-01 | Stop reason: HOSPADM

## 2020-01-01 RX ORDER — FENTANYL CITRATE 50 UG/ML
12.5-1 INJECTION, SOLUTION INTRAMUSCULAR; INTRAVENOUS
Status: DISCONTINUED | OUTPATIENT
Start: 2020-01-01 | End: 2020-01-01 | Stop reason: HOSPADM

## 2020-01-01 RX ORDER — CEFAZOLIN SODIUM/WATER 2 G/20 ML
2 SYRINGE (ML) INTRAVENOUS ONCE
Status: COMPLETED | OUTPATIENT
Start: 2020-01-01 | End: 2020-01-01

## 2020-01-01 RX ORDER — ACETAMINOPHEN 325 MG/1
650 TABLET ORAL EVERY 24 HOURS
Status: DISCONTINUED | OUTPATIENT
Start: 2020-01-01 | End: 2020-01-01 | Stop reason: HOSPADM

## 2020-01-01 RX ORDER — LIDOCAINE HYDROCHLORIDE AND EPINEPHRINE 10; 10 MG/ML; UG/ML
2-100 INJECTION, SOLUTION INFILTRATION; PERINEURAL ONCE
Status: COMPLETED | OUTPATIENT
Start: 2020-01-01 | End: 2020-01-01

## 2020-01-01 RX ORDER — FENTANYL CITRATE 50 UG/ML
25-50 INJECTION, SOLUTION INTRAMUSCULAR; INTRAVENOUS
Status: DISCONTINUED | OUTPATIENT
Start: 2020-01-01 | End: 2020-01-01

## 2020-01-01 RX ORDER — DIPHENHYDRAMINE HCL 25 MG
25 CAPSULE ORAL AS NEEDED
Status: DISCONTINUED | OUTPATIENT
Start: 2020-01-01 | End: 2020-01-01 | Stop reason: HOSPADM

## 2020-01-01 RX ORDER — MAGNESIUM SULFATE HEPTAHYDRATE 40 MG/ML
2 INJECTION, SOLUTION INTRAVENOUS ONCE
Status: COMPLETED | OUTPATIENT
Start: 2020-01-01 | End: 2020-01-01

## 2020-01-01 RX ORDER — HYDROCODONE BITARTRATE AND ACETAMINOPHEN 5; 325 MG/1; MG/1
1 TABLET ORAL
Status: DISCONTINUED | OUTPATIENT
Start: 2020-01-01 | End: 2020-01-01 | Stop reason: HOSPADM

## 2020-01-01 RX ORDER — ENOXAPARIN SODIUM 100 MG/ML
40 INJECTION SUBCUTANEOUS EVERY 24 HOURS
Status: DISCONTINUED | OUTPATIENT
Start: 2020-01-01 | End: 2020-01-01

## 2020-01-01 RX ORDER — LANOLIN ALCOHOL/MO/W.PET/CERES
400 CREAM (GRAM) TOPICAL 2 TIMES DAILY
Status: DISCONTINUED | OUTPATIENT
Start: 2020-01-01 | End: 2020-01-01

## 2020-01-01 RX ORDER — MIDAZOLAM HYDROCHLORIDE 1 MG/ML
.25-2 INJECTION, SOLUTION INTRAMUSCULAR; INTRAVENOUS
Status: DISCONTINUED | OUTPATIENT
Start: 2020-01-01 | End: 2020-01-01 | Stop reason: HOSPADM

## 2020-01-01 RX ORDER — DIPHENHYDRAMINE HYDROCHLORIDE 50 MG/ML
50 INJECTION, SOLUTION INTRAMUSCULAR; INTRAVENOUS EVERY 24 HOURS
Status: COMPLETED | OUTPATIENT
Start: 2020-01-01 | End: 2020-01-01

## 2020-01-01 RX ORDER — MIDAZOLAM HYDROCHLORIDE 1 MG/ML
.5-2 INJECTION, SOLUTION INTRAMUSCULAR; INTRAVENOUS
Status: DISCONTINUED | OUTPATIENT
Start: 2020-01-01 | End: 2020-01-01

## 2020-01-01 RX ORDER — EPINEPHRINE 1 MG/ML
0.3 INJECTION, SOLUTION, CONCENTRATE INTRAVENOUS AS NEEDED
Status: DISCONTINUED | OUTPATIENT
Start: 2020-01-01 | End: 2020-01-01 | Stop reason: HOSPADM

## 2020-01-01 RX ORDER — SODIUM CHLORIDE 9 MG/ML
250 INJECTION, SOLUTION INTRAVENOUS AS NEEDED
Status: DISCONTINUED | OUTPATIENT
Start: 2020-01-01 | End: 2020-01-01

## 2020-01-01 RX ORDER — ASPIRIN 81 MG/1
81 TABLET ORAL DAILY
Status: DISCONTINUED | OUTPATIENT
Start: 2020-01-01 | End: 2020-01-01

## 2020-01-01 RX ORDER — SODIUM CHLORIDE 9 MG/ML
25 INJECTION, SOLUTION INTRAVENOUS ONCE
Status: DISCONTINUED | OUTPATIENT
Start: 2020-01-01 | End: 2020-01-01

## 2020-01-01 RX ORDER — ACETAMINOPHEN 325 MG/1
650 TABLET ORAL EVERY 24 HOURS
Status: COMPLETED | OUTPATIENT
Start: 2020-01-01 | End: 2020-01-01

## 2020-01-01 RX ORDER — ALLOPURINOL 300 MG/1
300 TABLET ORAL 2 TIMES DAILY
Status: DISCONTINUED | OUTPATIENT
Start: 2020-01-01 | End: 2020-01-01 | Stop reason: HOSPADM

## 2020-01-01 RX ORDER — PANTOPRAZOLE SODIUM 40 MG/1
40 TABLET, DELAYED RELEASE ORAL DAILY
Status: DISCONTINUED | OUTPATIENT
Start: 2020-01-01 | End: 2020-01-01

## 2020-01-01 RX ORDER — HEPARIN 100 UNIT/ML
600 SYRINGE INTRAVENOUS AS NEEDED
Status: DISPENSED | OUTPATIENT
Start: 2020-01-01 | End: 2020-01-01

## 2020-01-01 RX ORDER — ACETAMINOPHEN 325 MG/1
650 TABLET ORAL
Status: DISPENSED | OUTPATIENT
Start: 2020-01-01 | End: 2020-01-01

## 2020-01-01 RX ORDER — SODIUM CHLORIDE 9 MG/ML
10 INJECTION INTRAMUSCULAR; INTRAVENOUS; SUBCUTANEOUS AS NEEDED
Status: DISCONTINUED | OUTPATIENT
Start: 2020-01-01 | End: 2020-01-01 | Stop reason: HOSPADM

## 2020-01-01 RX ORDER — ASPIRIN 81 MG/1
81 TABLET ORAL DAILY
Status: DISCONTINUED | OUTPATIENT
Start: 2020-01-01 | End: 2020-01-01 | Stop reason: HOSPADM

## 2020-01-01 RX ADMIN — ONDANSETRON 8 MG: 2 INJECTION INTRAMUSCULAR; INTRAVENOUS at 13:51

## 2020-01-01 RX ADMIN — ACETAMINOPHEN 650 MG: 325 TABLET, FILM COATED ORAL at 20:59

## 2020-01-01 RX ADMIN — ACYCLOVIR 400 MG: 200 CAPSULE ORAL at 08:52

## 2020-01-01 RX ADMIN — ALLOPURINOL 300 MG: 300 TABLET ORAL at 16:06

## 2020-01-01 RX ADMIN — Medication 400 MG: at 09:22

## 2020-01-01 RX ADMIN — SODIUM CHLORIDE 250 ML: 900 INJECTION, SOLUTION INTRAVENOUS at 12:48

## 2020-01-01 RX ADMIN — SODIUM CHLORIDE 75 ML/HR: 900 INJECTION, SOLUTION INTRAVENOUS at 03:28

## 2020-01-01 RX ADMIN — Medication 20 ML: at 06:00

## 2020-01-01 RX ADMIN — Medication 20 ML: at 05:21

## 2020-01-01 RX ADMIN — MIDAZOLAM 0.5 MG: 1 INJECTION INTRAMUSCULAR; INTRAVENOUS at 10:01

## 2020-01-01 RX ADMIN — POTASSIUM CHLORIDE 20 MEQ: 1500 TABLET, EXTENDED RELEASE ORAL at 17:37

## 2020-01-01 RX ADMIN — ALTEPLASE 1 MG: 2.2 INJECTION, POWDER, LYOPHILIZED, FOR SOLUTION INTRAVENOUS at 18:03

## 2020-01-01 RX ADMIN — SODIUM CHLORIDE 10 ML: 9 INJECTION INTRAMUSCULAR; INTRAVENOUS; SUBCUTANEOUS at 15:56

## 2020-01-01 RX ADMIN — ALLOPURINOL 300 MG: 300 TABLET ORAL at 12:43

## 2020-01-01 RX ADMIN — IOPAMIDOL 10 ML: 612 INJECTION, SOLUTION INTRAVENOUS at 12:06

## 2020-01-01 RX ADMIN — SODIUM CHLORIDE 10 ML: 9 INJECTION INTRAMUSCULAR; INTRAVENOUS; SUBCUTANEOUS at 14:50

## 2020-01-01 RX ADMIN — ALLOPURINOL 300 MG: 300 TABLET ORAL at 08:52

## 2020-01-01 RX ADMIN — Medication 10 ML: at 09:45

## 2020-01-01 RX ADMIN — POTASSIUM CHLORIDE 20 MEQ: 1500 TABLET, EXTENDED RELEASE ORAL at 17:13

## 2020-01-01 RX ADMIN — SODIUM CHLORIDE 500 ML: 900 INJECTION, SOLUTION INTRAVENOUS at 21:06

## 2020-01-01 RX ADMIN — Medication 20 ML: at 13:04

## 2020-01-01 RX ADMIN — ACETAMINOPHEN 650 MG: 325 TABLET, FILM COATED ORAL at 12:35

## 2020-01-01 RX ADMIN — DIPHENHYDRAMINE HYDROCHLORIDE 50 MG: 50 INJECTION, SOLUTION INTRAMUSCULAR; INTRAVENOUS at 20:57

## 2020-01-01 RX ADMIN — MIDAZOLAM 0.5 MG: 1 INJECTION INTRAMUSCULAR; INTRAVENOUS at 10:04

## 2020-01-01 RX ADMIN — LIDOCAINE HYDROCHLORIDE 110 MG: 20 INJECTION, SOLUTION INFILTRATION; PERINEURAL at 08:51

## 2020-01-01 RX ADMIN — SODIUM CHLORIDE 75 ML/HR: 900 INJECTION, SOLUTION INTRAVENOUS at 14:44

## 2020-01-01 RX ADMIN — Medication 20 ML: at 22:32

## 2020-01-01 RX ADMIN — POTASSIUM CHLORIDE 20 MEQ: 1500 TABLET, EXTENDED RELEASE ORAL at 12:43

## 2020-01-01 RX ADMIN — POLATUZUMAB VEDOTIN 140 MG: 140 INJECTION, POWDER, LYOPHILIZED, FOR SOLUTION INTRAVENOUS at 14:23

## 2020-01-01 RX ADMIN — FENTANYL CITRATE 50 MCG: 50 INJECTION, SOLUTION INTRAMUSCULAR; INTRAVENOUS at 09:46

## 2020-01-01 RX ADMIN — ACYCLOVIR 400 MG: 200 CAPSULE ORAL at 09:38

## 2020-01-01 RX ADMIN — SODIUM CHLORIDE 25 ML/HR: 9 INJECTION, SOLUTION INTRAVENOUS at 12:40

## 2020-01-01 RX ADMIN — Medication 400 MG: at 12:43

## 2020-01-01 RX ADMIN — MESNA 9900 MG: 100 INJECTION, SOLUTION INTRAVENOUS at 15:57

## 2020-01-01 RX ADMIN — ONDANSETRON 8 MG: 2 INJECTION INTRAMUSCULAR; INTRAVENOUS at 13:52

## 2020-01-01 RX ADMIN — ONDANSETRON 8 MG: 2 INJECTION INTRAMUSCULAR; INTRAVENOUS at 21:00

## 2020-01-01 RX ADMIN — DIPHENHYDRAMINE HYDROCHLORIDE 50 MG: 50 INJECTION, SOLUTION INTRAMUSCULAR; INTRAVENOUS at 20:58

## 2020-01-01 RX ADMIN — AMLODIPINE BESYLATE 10 MG: 10 TABLET ORAL at 08:51

## 2020-01-01 RX ADMIN — ALLOPURINOL 300 MG: 300 TABLET ORAL at 17:36

## 2020-01-01 RX ADMIN — ACYCLOVIR 400 MG: 200 CAPSULE ORAL at 17:26

## 2020-01-01 RX ADMIN — SODIUM CHLORIDE 1000 ML: 900 INJECTION, SOLUTION INTRAVENOUS at 15:50

## 2020-01-01 RX ADMIN — Medication 400 MG: at 17:26

## 2020-01-01 RX ADMIN — ETOPOSIDE 198 MG: 20 INJECTION INTRAVENOUS at 14:18

## 2020-01-01 RX ADMIN — ACYCLOVIR 400 MG: 200 CAPSULE ORAL at 17:36

## 2020-01-01 RX ADMIN — ALTEPLASE 2 MG: 2.2 INJECTION, POWDER, LYOPHILIZED, FOR SOLUTION INTRAVENOUS at 09:11

## 2020-01-01 RX ADMIN — DEXAMETHASONE SODIUM PHOSPHATE 10 MG: 10 INJECTION INTRAMUSCULAR; INTRAVENOUS at 14:06

## 2020-01-01 RX ADMIN — ACETAMINOPHEN 650 MG: 325 TABLET, FILM COATED ORAL at 09:54

## 2020-01-01 RX ADMIN — DEXAMETHASONE SODIUM PHOSPHATE 10 MG: 10 INJECTION INTRAMUSCULAR; INTRAVENOUS at 14:44

## 2020-01-01 RX ADMIN — BENDAMUSTINE HYDROCHLORIDE 170 MG: 25 INJECTION, SOLUTION INTRAVENOUS at 15:36

## 2020-01-01 RX ADMIN — SODIUM CHLORIDE 75 ML/HR: 900 INJECTION, SOLUTION INTRAVENOUS at 18:39

## 2020-01-01 RX ADMIN — SODIUM CHLORIDE 150 MG: 900 INJECTION, SOLUTION INTRAVENOUS at 13:50

## 2020-01-01 RX ADMIN — SODIUM CHLORIDE 1000 ML: 900 INJECTION, SOLUTION INTRAVENOUS at 08:50

## 2020-01-01 RX ADMIN — FLUCONAZOLE 400 MG: 100 TABLET ORAL at 08:21

## 2020-01-01 RX ADMIN — MIDAZOLAM 0.5 MG: 1 INJECTION INTRAMUSCULAR; INTRAVENOUS at 10:10

## 2020-01-01 RX ADMIN — LIDOCAINE HYDROCHLORIDE 200 MG: 20 INJECTION, SOLUTION INFILTRATION; PERINEURAL at 10:00

## 2020-01-01 RX ADMIN — Medication 20 ML: at 13:26

## 2020-01-01 RX ADMIN — FOLIC ACID 1 MG: 1 TABLET ORAL at 09:23

## 2020-01-01 RX ADMIN — ONDANSETRON 8 MG: 2 INJECTION INTRAMUSCULAR; INTRAVENOUS at 14:44

## 2020-01-01 RX ADMIN — ONDANSETRON 8 MG: 2 INJECTION INTRAMUSCULAR; INTRAVENOUS at 21:34

## 2020-01-01 RX ADMIN — AMLODIPINE BESYLATE 10 MG: 10 TABLET ORAL at 09:01

## 2020-01-01 RX ADMIN — Medication 400 MG: at 20:48

## 2020-01-01 RX ADMIN — DEXAMETHASONE SODIUM PHOSPHATE 20 MG: 10 INJECTION INTRAMUSCULAR; INTRAVENOUS at 20:59

## 2020-01-01 RX ADMIN — OBINUTUZUMAB 100 MG: 1000 INJECTION, SOLUTION, CONCENTRATE INTRAVENOUS at 22:30

## 2020-01-01 RX ADMIN — MIDAZOLAM 1 MG: 1 INJECTION INTRAMUSCULAR; INTRAVENOUS at 08:37

## 2020-01-01 RX ADMIN — DEXAMETHASONE SODIUM PHOSPHATE 10 MG: 10 INJECTION, SOLUTION INTRAMUSCULAR; INTRAVENOUS at 13:53

## 2020-01-01 RX ADMIN — POTASSIUM CHLORIDE 20 MEQ: 1500 TABLET, EXTENDED RELEASE ORAL at 08:52

## 2020-01-01 RX ADMIN — PANTOPRAZOLE SODIUM 40 MG: 40 TABLET, DELAYED RELEASE ORAL at 08:53

## 2020-01-01 RX ADMIN — ALLOPURINOL 300 MG: 300 TABLET ORAL at 17:13

## 2020-01-01 RX ADMIN — MELOXICAM 15 MG: 7.5 TABLET ORAL at 09:01

## 2020-01-01 RX ADMIN — ALLOPURINOL 300 MG: 300 TABLET ORAL at 08:19

## 2020-01-01 RX ADMIN — ACETAMINOPHEN 650 MG: 325 TABLET, FILM COATED ORAL at 12:08

## 2020-01-01 RX ADMIN — ETOPOSIDE 198 MG: 20 INJECTION INTRAVENOUS at 15:35

## 2020-01-01 RX ADMIN — ALLOPURINOL 300 MG: 300 TABLET ORAL at 09:01

## 2020-01-01 RX ADMIN — Medication 10 ML: at 21:00

## 2020-01-01 RX ADMIN — AMLODIPINE BESYLATE 10 MG: 10 TABLET ORAL at 08:19

## 2020-01-01 RX ADMIN — MIDAZOLAM 0.5 MG: 1 INJECTION INTRAMUSCULAR; INTRAVENOUS at 10:17

## 2020-01-01 RX ADMIN — Medication 400 MG: at 17:13

## 2020-01-01 RX ADMIN — SODIUM CHLORIDE 700 MG: 9 INJECTION, SOLUTION INTRAVENOUS at 10:29

## 2020-01-01 RX ADMIN — Medication 10 ML: at 17:14

## 2020-01-01 RX ADMIN — FOLIC ACID 1 MG: 1 TABLET ORAL at 08:51

## 2020-01-01 RX ADMIN — ONDANSETRON 8 MG: 2 INJECTION INTRAMUSCULAR; INTRAVENOUS at 14:56

## 2020-01-01 RX ADMIN — FOLIC ACID 1 MG: 1 TABLET ORAL at 09:20

## 2020-01-01 RX ADMIN — PANTOPRAZOLE SODIUM 40 MG: 40 TABLET, DELAYED RELEASE ORAL at 08:52

## 2020-01-01 RX ADMIN — CARBOPLATIN 750 MG: 10 INJECTION, SOLUTION INTRAVENOUS at 15:21

## 2020-01-01 RX ADMIN — FOSAPREPITANT 150 MG: 150 INJECTION, POWDER, LYOPHILIZED, FOR SOLUTION INTRAVENOUS at 14:44

## 2020-01-01 RX ADMIN — ACYCLOVIR 400 MG: 200 CAPSULE ORAL at 16:06

## 2020-01-01 RX ADMIN — SODIUM CHLORIDE 25 ML/HR: 9 INJECTION, SOLUTION INTRAVENOUS at 14:51

## 2020-01-01 RX ADMIN — MESNA 9900 MG: 100 INJECTION, SOLUTION INTRAVENOUS at 16:03

## 2020-01-01 RX ADMIN — ACETAMINOPHEN 650 MG: 325 TABLET, FILM COATED ORAL at 08:52

## 2020-01-01 RX ADMIN — LIDOCAINE HYDROCHLORIDE 4 ML: 20 INJECTION, SOLUTION INFILTRATION; PERINEURAL at 13:57

## 2020-01-01 RX ADMIN — ALLOPURINOL 300 MG: 300 TABLET ORAL at 09:38

## 2020-01-01 RX ADMIN — Medication 20 ML: at 22:00

## 2020-01-01 RX ADMIN — Medication 81 MG: at 09:38

## 2020-01-01 RX ADMIN — BENDAMUSTINE HYDROCHLORIDE 170 MG: 25 INJECTION, SOLUTION INTRAVENOUS at 13:30

## 2020-01-01 RX ADMIN — Medication 81 MG: at 09:23

## 2020-01-01 RX ADMIN — ONDANSETRON 8 MG: 2 INJECTION INTRAMUSCULAR; INTRAVENOUS at 14:07

## 2020-01-01 RX ADMIN — ONDANSETRON 8 MG: 2 INJECTION INTRAMUSCULAR; INTRAVENOUS at 05:03

## 2020-01-01 RX ADMIN — LIDOCAINE HYDROCHLORIDE 4 MG: 20 INJECTION, SOLUTION INFILTRATION; PERINEURAL at 12:16

## 2020-01-01 RX ADMIN — Medication 400 MG: at 16:33

## 2020-01-01 RX ADMIN — AMLODIPINE BESYLATE 10 MG: 10 TABLET ORAL at 08:22

## 2020-01-01 RX ADMIN — Medication 10 ML: at 12:46

## 2020-01-01 RX ADMIN — ALTEPLASE 2 MG: 2.2 INJECTION, POWDER, LYOPHILIZED, FOR SOLUTION INTRAVENOUS at 09:06

## 2020-01-01 RX ADMIN — FENTANYL CITRATE 50 MCG: 50 INJECTION, SOLUTION INTRAMUSCULAR; INTRAVENOUS at 09:50

## 2020-01-01 RX ADMIN — PEGFILGRASTIM-CBQV 6 MG: 6 INJECTION, SOLUTION SUBCUTANEOUS at 14:25

## 2020-01-01 RX ADMIN — ETOPOSIDE 198 MG: 20 INJECTION INTRAVENOUS at 14:16

## 2020-01-01 RX ADMIN — FOLIC ACID 1 MG: 1 TABLET ORAL at 08:19

## 2020-01-01 RX ADMIN — FOLIC ACID 1 MG: 1 TABLET ORAL at 08:52

## 2020-01-01 RX ADMIN — FENTANYL CITRATE 50 MCG: 50 INJECTION, SOLUTION INTRAMUSCULAR; INTRAVENOUS at 08:37

## 2020-01-01 RX ADMIN — AMLODIPINE BESYLATE 10 MG: 10 TABLET ORAL at 09:38

## 2020-01-01 RX ADMIN — ENOXAPARIN SODIUM 40 MG: 40 INJECTION SUBCUTANEOUS at 17:37

## 2020-01-01 RX ADMIN — ACYCLOVIR 400 MG: 200 CAPSULE ORAL at 09:17

## 2020-01-01 RX ADMIN — CEFAZOLIN 2 G: 10 INJECTION, POWDER, FOR SOLUTION INTRAVENOUS at 09:45

## 2020-01-01 RX ADMIN — POLATUZUMAB VEDOTIN 140 MG: 140 INJECTION, POWDER, LYOPHILIZED, FOR SOLUTION INTRAVENOUS at 12:28

## 2020-01-01 RX ADMIN — FOLIC ACID 1 MG: 1 TABLET ORAL at 08:22

## 2020-01-01 RX ADMIN — Medication 400 MG: at 17:04

## 2020-01-01 RX ADMIN — DIPHENHYDRAMINE HYDROCHLORIDE 25 MG: 25 CAPSULE ORAL at 09:54

## 2020-01-01 RX ADMIN — OBINUTUZUMAB 1000 MG: 1000 INJECTION, SOLUTION, CONCENTRATE INTRAVENOUS at 12:50

## 2020-01-01 RX ADMIN — AMLODIPINE BESYLATE 10 MG: 10 TABLET ORAL at 09:17

## 2020-01-01 RX ADMIN — ENOXAPARIN SODIUM 40 MG: 40 INJECTION SUBCUTANEOUS at 16:06

## 2020-01-01 RX ADMIN — SODIUM CHLORIDE 25 ML/HR: 900 INJECTION, SOLUTION INTRAVENOUS at 14:03

## 2020-01-01 RX ADMIN — Medication 10 ML: at 09:50

## 2020-01-01 RX ADMIN — Medication 81 MG: at 09:01

## 2020-01-01 RX ADMIN — MELOXICAM 15 MG: 7.5 TABLET ORAL at 08:22

## 2020-01-01 RX ADMIN — FENTANYL CITRATE 25 MCG: 50 INJECTION, SOLUTION INTRAMUSCULAR; INTRAVENOUS at 10:17

## 2020-01-01 RX ADMIN — DIPHENHYDRAMINE HYDROCHLORIDE 50 MG: 50 INJECTION, SOLUTION INTRAMUSCULAR; INTRAVENOUS at 08:53

## 2020-01-01 RX ADMIN — FENTANYL CITRATE 25 MCG: 50 INJECTION, SOLUTION INTRAMUSCULAR; INTRAVENOUS at 10:04

## 2020-01-01 RX ADMIN — FUROSEMIDE 20 MG: 10 INJECTION, SOLUTION INTRAMUSCULAR; INTRAVENOUS at 13:55

## 2020-01-01 RX ADMIN — ONDANSETRON 8 MG: 2 INJECTION INTRAMUSCULAR; INTRAVENOUS at 05:12

## 2020-01-01 RX ADMIN — MELOXICAM 15 MG: 7.5 TABLET ORAL at 08:19

## 2020-01-01 RX ADMIN — Medication 400 MG: at 09:38

## 2020-01-01 RX ADMIN — MIDAZOLAM 1 MG: 1 INJECTION INTRAMUSCULAR; INTRAVENOUS at 08:43

## 2020-01-01 RX ADMIN — SODIUM CHLORIDE 20 MG: 900 INJECTION, SOLUTION INTRAVENOUS at 12:28

## 2020-01-01 RX ADMIN — MELOXICAM 15 MG: 7.5 TABLET ORAL at 12:43

## 2020-01-01 RX ADMIN — MELOXICAM 15 MG: 7.5 TABLET ORAL at 09:38

## 2020-01-01 RX ADMIN — Medication 10 ML: at 21:08

## 2020-01-01 RX ADMIN — POTASSIUM CHLORIDE 20 MEQ: 400 INJECTION, SOLUTION INTRAVENOUS at 10:17

## 2020-01-01 RX ADMIN — OBINUTUZUMAB 1000 MG: 1000 INJECTION, SOLUTION, CONCENTRATE INTRAVENOUS at 16:28

## 2020-01-01 RX ADMIN — Medication 20 ML: at 21:35

## 2020-01-01 RX ADMIN — SODIUM CHLORIDE: 9 INJECTION, SOLUTION INTRAMUSCULAR; INTRAVENOUS; SUBCUTANEOUS at 13:32

## 2020-01-01 RX ADMIN — Medication 400 MG: at 17:37

## 2020-01-01 RX ADMIN — Medication 81 MG: at 08:52

## 2020-01-01 RX ADMIN — DIPHENHYDRAMINE HYDROCHLORIDE 50 MG: 50 INJECTION, SOLUTION INTRAMUSCULAR; INTRAVENOUS at 09:36

## 2020-01-01 RX ADMIN — ACYCLOVIR 400 MG: 200 CAPSULE ORAL at 17:04

## 2020-01-01 RX ADMIN — ACYCLOVIR 400 MG: 200 CAPSULE ORAL at 17:08

## 2020-01-01 RX ADMIN — ETOPOSIDE 198 MG: 20 INJECTION INTRAVENOUS at 15:47

## 2020-01-01 RX ADMIN — ALLOPURINOL 300 MG: 300 TABLET ORAL at 08:53

## 2020-01-01 RX ADMIN — PANTOPRAZOLE SODIUM 40 MG: 40 TABLET, DELAYED RELEASE ORAL at 09:01

## 2020-01-01 RX ADMIN — SODIUM CHLORIDE 500 ML: 900 INJECTION, SOLUTION INTRAVENOUS at 09:45

## 2020-01-01 RX ADMIN — Medication 10 ML: at 16:14

## 2020-01-01 RX ADMIN — BENDAMUSTINE HYDROCHLORIDE 170 MG: 25 INJECTION, SOLUTION INTRAVENOUS at 09:18

## 2020-01-01 RX ADMIN — DIATRIZOATE MEGLUMINE AND DIATRIZOATE SODIUM 10 ML: 660; 100 LIQUID ORAL; RECTAL at 08:24

## 2020-01-01 RX ADMIN — SODIUM CHLORIDE 25 ML/HR: 900 INJECTION, SOLUTION INTRAVENOUS at 10:16

## 2020-01-01 RX ADMIN — BENDAMUSTINE HYDROCHLORIDE 170 MG: 25 INJECTION, SOLUTION INTRAVENOUS at 16:00

## 2020-01-01 RX ADMIN — SODIUM CHLORIDE 75 ML/HR: 900 INJECTION, SOLUTION INTRAVENOUS at 19:52

## 2020-01-01 RX ADMIN — ACYCLOVIR 400 MG: 200 CAPSULE ORAL at 12:43

## 2020-01-01 RX ADMIN — DEXAMETHASONE SODIUM PHOSPHATE 10 MG: 10 INJECTION, SOLUTION INTRAMUSCULAR; INTRAVENOUS at 14:05

## 2020-01-01 RX ADMIN — AMLODIPINE BESYLATE 10 MG: 10 TABLET ORAL at 09:23

## 2020-01-01 RX ADMIN — ACYCLOVIR 400 MG: 200 CAPSULE ORAL at 08:23

## 2020-01-01 RX ADMIN — SODIUM CHLORIDE 75 ML/HR: 900 INJECTION, SOLUTION INTRAVENOUS at 22:40

## 2020-01-01 RX ADMIN — ACETAMINOPHEN 650 MG: 325 TABLET, FILM COATED ORAL at 12:57

## 2020-01-01 RX ADMIN — ACYCLOVIR 400 MG: 200 CAPSULE ORAL at 17:29

## 2020-01-01 RX ADMIN — Medication 81 MG: at 12:43

## 2020-01-01 RX ADMIN — ALLOPURINOL 300 MG: 300 TABLET ORAL at 17:26

## 2020-01-01 RX ADMIN — ONDANSETRON 8 MG: 2 INJECTION INTRAMUSCULAR; INTRAVENOUS at 21:07

## 2020-01-01 RX ADMIN — WATER 1 MG: 1 INJECTION INTRAMUSCULAR; INTRAVENOUS; SUBCUTANEOUS at 17:00

## 2020-01-01 RX ADMIN — SODIUM CHLORIDE 700 MG: 9 INJECTION, SOLUTION INTRAVENOUS at 10:20

## 2020-01-01 RX ADMIN — Medication 10 ML: at 16:25

## 2020-01-01 RX ADMIN — Medication 400 MG: at 08:52

## 2020-01-01 RX ADMIN — PANTOPRAZOLE SODIUM 40 MG: 40 TABLET, DELAYED RELEASE ORAL at 12:43

## 2020-01-01 RX ADMIN — HEPARIN 600 UNITS: 100 SYRINGE at 09:33

## 2020-01-01 RX ADMIN — Medication 10 ML: at 12:09

## 2020-01-01 RX ADMIN — MELOXICAM 15 MG: 7.5 TABLET ORAL at 08:52

## 2020-01-01 RX ADMIN — SODIUM CHLORIDE 75 ML/HR: 900 INJECTION, SOLUTION INTRAVENOUS at 08:18

## 2020-01-01 RX ADMIN — Medication 400 MG: at 10:22

## 2020-01-01 RX ADMIN — ACYCLOVIR 400 MG: 200 CAPSULE ORAL at 17:52

## 2020-01-01 RX ADMIN — ACETAMINOPHEN 650 MG: 325 TABLET, FILM COATED ORAL at 20:57

## 2020-01-01 RX ADMIN — Medication 400 MG: at 21:00

## 2020-01-01 RX ADMIN — FLUCONAZOLE 400 MG: 100 TABLET ORAL at 09:20

## 2020-01-01 RX ADMIN — ALLOPURINOL 300 MG: 300 TABLET ORAL at 08:22

## 2020-01-01 RX ADMIN — ACYCLOVIR 400 MG: 200 CAPSULE ORAL at 18:11

## 2020-01-01 RX ADMIN — DIPHENHYDRAMINE HYDROCHLORIDE 50 MG: 50 INJECTION, SOLUTION INTRAMUSCULAR; INTRAVENOUS at 12:35

## 2020-01-01 RX ADMIN — DIPHENHYDRAMINE HYDROCHLORIDE 50 MG: 50 INJECTION, SOLUTION INTRAMUSCULAR; INTRAVENOUS at 08:27

## 2020-01-01 RX ADMIN — Medication 400 MG: at 17:29

## 2020-01-01 RX ADMIN — SODIUM CHLORIDE 75 ML/HR: 900 INJECTION, SOLUTION INTRAVENOUS at 05:41

## 2020-01-01 RX ADMIN — ONDANSETRON 8 MG: 2 INJECTION INTRAMUSCULAR; INTRAVENOUS at 14:58

## 2020-01-01 RX ADMIN — FENTANYL CITRATE 25 MCG: 50 INJECTION, SOLUTION INTRAMUSCULAR; INTRAVENOUS at 10:01

## 2020-01-01 RX ADMIN — SODIUM CHLORIDE 100 ML: 900 INJECTION, SOLUTION INTRAVENOUS at 16:14

## 2020-01-01 RX ADMIN — Medication 400 MG: at 09:01

## 2020-01-01 RX ADMIN — SODIUM CHLORIDE 500 ML: 900 INJECTION, SOLUTION INTRAVENOUS at 12:09

## 2020-01-01 RX ADMIN — Medication 400 MG: at 16:06

## 2020-01-01 RX ADMIN — SODIUM CHLORIDE: 9 INJECTION, SOLUTION INTRAMUSCULAR; INTRAVENOUS; SUBCUTANEOUS at 11:41

## 2020-01-01 RX ADMIN — ALLOPURINOL 300 MG: 300 TABLET ORAL at 17:04

## 2020-01-01 RX ADMIN — SODIUM CHLORIDE 500 ML: 900 INJECTION, SOLUTION INTRAVENOUS at 14:46

## 2020-01-01 RX ADMIN — DEXAMETHASONE SODIUM PHOSPHATE 10 MG: 10 INJECTION INTRAMUSCULAR; INTRAVENOUS at 13:06

## 2020-01-01 RX ADMIN — DEXAMETHASONE SODIUM PHOSPHATE 10 MG: 10 INJECTION INTRAMUSCULAR; INTRAVENOUS at 16:05

## 2020-01-01 RX ADMIN — HEPARIN 600 UNITS: 100 SYRINGE at 16:25

## 2020-01-01 RX ADMIN — CARBOPLATIN 750 MG: 10 INJECTION, SOLUTION INTRAVENOUS at 15:25

## 2020-01-01 RX ADMIN — IOPAMIDOL 100 ML: 755 INJECTION, SOLUTION INTRAVENOUS at 16:14

## 2020-01-01 RX ADMIN — Medication 10 ML: at 08:24

## 2020-01-01 RX ADMIN — SODIUM CHLORIDE 75 ML/HR: 900 INJECTION, SOLUTION INTRAVENOUS at 13:05

## 2020-01-01 RX ADMIN — ACETAMINOPHEN 650 MG: 325 TABLET, FILM COATED ORAL at 16:05

## 2020-01-01 RX ADMIN — ACYCLOVIR 400 MG: 200 CAPSULE ORAL at 09:01

## 2020-01-01 RX ADMIN — SODIUM CHLORIDE 75 ML/HR: 900 INJECTION, SOLUTION INTRAVENOUS at 16:07

## 2020-01-01 RX ADMIN — ALTEPLASE 2 MG: 2.2 INJECTION, POWDER, LYOPHILIZED, FOR SOLUTION INTRAVENOUS at 08:38

## 2020-01-01 RX ADMIN — ENOXAPARIN SODIUM 40 MG: 40 INJECTION SUBCUTANEOUS at 17:04

## 2020-01-01 RX ADMIN — POTASSIUM CHLORIDE 20 MEQ: 1500 TABLET, EXTENDED RELEASE ORAL at 16:06

## 2020-01-01 RX ADMIN — LIDOCAINE HYDROCHLORIDE 4 MG: 20 INJECTION, SOLUTION INFILTRATION; PERINEURAL at 11:45

## 2020-01-01 RX ADMIN — MAGNESIUM SULFATE HEPTAHYDRATE 2 G: 40 INJECTION, SOLUTION INTRAVENOUS at 09:37

## 2020-01-01 RX ADMIN — ENOXAPARIN SODIUM 40 MG: 40 INJECTION SUBCUTANEOUS at 17:29

## 2020-01-01 RX ADMIN — ALLOPURINOL 300 MG: 300 TABLET ORAL at 09:23

## 2020-01-01 RX ADMIN — PANTOPRAZOLE SODIUM 40 MG: 40 TABLET, DELAYED RELEASE ORAL at 09:38

## 2020-01-01 RX ADMIN — ONDANSETRON 8 MG: 2 INJECTION INTRAMUSCULAR; INTRAVENOUS at 09:03

## 2020-01-01 RX ADMIN — FOLIC ACID 1 MG: 1 TABLET ORAL at 12:43

## 2020-01-01 RX ADMIN — FENTANYL CITRATE 25 MCG: 50 INJECTION, SOLUTION INTRAMUSCULAR; INTRAVENOUS at 10:11

## 2020-01-01 RX ADMIN — ETOPOSIDE 198 MG: 20 INJECTION INTRAVENOUS at 14:13

## 2020-01-01 RX ADMIN — PANTOPRAZOLE SODIUM 40 MG: 40 TABLET, DELAYED RELEASE ORAL at 09:23

## 2020-01-01 RX ADMIN — MELOXICAM 15 MG: 7.5 TABLET ORAL at 08:53

## 2020-01-01 RX ADMIN — DIPHENHYDRAMINE HYDROCHLORIDE 50 MG: 50 INJECTION, SOLUTION INTRAMUSCULAR; INTRAVENOUS at 09:46

## 2020-01-01 RX ADMIN — SODIUM CHLORIDE 250 ML: 900 INJECTION, SOLUTION INTRAVENOUS at 09:55

## 2020-01-01 RX ADMIN — PEGFILGRASTIM-CBQV 6 MG: 6 INJECTION, SOLUTION SUBCUTANEOUS at 15:24

## 2020-01-01 RX ADMIN — HEPARIN SODIUM (PORCINE) LOCK FLUSH IV SOLN 100 UNIT/ML 500 UNITS: 100 SOLUTION at 10:00

## 2020-01-01 RX ADMIN — IOPAMIDOL 4 ML: 612 INJECTION, SOLUTION INTRAVENOUS at 11:53

## 2020-01-01 RX ADMIN — LIDOCAINE HYDROCHLORIDE 2 ML: 20 INJECTION, SOLUTION INFILTRATION; PERINEURAL at 10:00

## 2020-01-01 RX ADMIN — OBINUTUZUMAB 900 MG: 1000 INJECTION, SOLUTION, CONCENTRATE INTRAVENOUS at 22:02

## 2020-01-01 RX ADMIN — DEXAMETHASONE SODIUM PHOSPHATE 10 MG: 10 INJECTION, SOLUTION INTRAMUSCULAR; INTRAVENOUS at 13:52

## 2020-01-01 RX ADMIN — MELOXICAM 15 MG: 7.5 TABLET ORAL at 09:23

## 2020-01-01 RX ADMIN — SODIUM CHLORIDE 25 ML/HR: 900 INJECTION, SOLUTION INTRAVENOUS at 22:34

## 2020-01-01 RX ADMIN — Medication 400 MG: at 21:07

## 2020-01-01 RX ADMIN — DEXAMETHASONE SODIUM PHOSPHATE 10 MG: 10 INJECTION INTRAMUSCULAR; INTRAVENOUS at 15:00

## 2020-01-01 RX ADMIN — SODIUM CHLORIDE 25 ML/HR: 900 INJECTION, SOLUTION INTRAVENOUS at 08:58

## 2020-01-01 RX ADMIN — Medication 10 ML: at 11:15

## 2020-01-01 RX ADMIN — Medication 10 ML: at 08:58

## 2020-01-01 RX ADMIN — FENTANYL CITRATE 50 MCG: 50 INJECTION, SOLUTION INTRAMUSCULAR; INTRAVENOUS at 08:43

## 2020-01-01 RX ADMIN — ONDANSETRON 8 MG: 2 INJECTION INTRAMUSCULAR; INTRAVENOUS at 13:04

## 2020-01-01 RX ADMIN — MIDAZOLAM 1 MG: 1 INJECTION INTRAMUSCULAR; INTRAVENOUS at 08:50

## 2020-01-01 RX ADMIN — SODIUM CHLORIDE 1000 ML: 900 INJECTION, SOLUTION INTRAVENOUS at 09:30

## 2020-01-01 RX ADMIN — OBINUTUZUMAB 1000 MG: 1000 INJECTION, SOLUTION, CONCENTRATE INTRAVENOUS at 13:30

## 2020-01-01 RX ADMIN — FOLIC ACID 1 MG: 1 TABLET ORAL at 09:38

## 2020-01-01 RX ADMIN — SODIUM CHLORIDE 25 ML/HR: 900 INJECTION, SOLUTION INTRAVENOUS at 09:25

## 2020-01-01 RX ADMIN — ACYCLOVIR 400 MG: 200 CAPSULE ORAL at 17:37

## 2020-01-01 RX ADMIN — SODIUM CHLORIDE: 9 INJECTION, SOLUTION INTRAMUSCULAR; INTRAVENOUS; SUBCUTANEOUS at 09:43

## 2020-01-01 RX ADMIN — ONDANSETRON 8 MG: 2 INJECTION INTRAMUSCULAR; INTRAVENOUS at 13:45

## 2020-01-01 RX ADMIN — FOLIC ACID 1 MG: 1 TABLET ORAL at 09:01

## 2020-01-01 RX ADMIN — DIPHENHYDRAMINE HYDROCHLORIDE 25 MG: 25 CAPSULE ORAL at 12:56

## 2020-01-01 RX ADMIN — PEGFILGRASTIM-CBQV 6 MG: 6 INJECTION, SOLUTION SUBCUTANEOUS at 16:56

## 2020-01-01 RX ADMIN — Medication 20 ML: at 13:54

## 2020-01-01 RX ADMIN — LIDOCAINE HYDROCHLORIDE,EPINEPHRINE BITARTRATE 400 MG: 10; .01 INJECTION, SOLUTION INFILTRATION; PERINEURAL at 10:00

## 2020-01-01 RX ADMIN — DIATRIZOATE MEGLUMINE AND DIATRIZOATE SODIUM 15 ML: 660; 100 LIQUID ORAL; RECTAL at 16:14

## 2020-01-01 RX ADMIN — DEXAMETHASONE SODIUM PHOSPHATE 20 MG: 10 INJECTION INTRAMUSCULAR; INTRAVENOUS at 20:58

## 2020-01-01 RX ADMIN — ACETAMINOPHEN 650 MG: 325 TABLET, FILM COATED ORAL at 09:36

## 2020-01-01 RX ADMIN — ONDANSETRON 8 MG: 2 INJECTION INTRAMUSCULAR; INTRAVENOUS at 06:59

## 2020-01-01 RX ADMIN — POTASSIUM CHLORIDE 20 MEQ: 1500 TABLET, EXTENDED RELEASE ORAL at 17:26

## 2020-01-01 RX ADMIN — MELOXICAM 15 MG: 7.5 TABLET ORAL at 09:20

## 2020-01-01 RX ADMIN — SODIUM CHLORIDE 25 ML/HR: 900 INJECTION, SOLUTION INTRAVENOUS at 14:39

## 2020-01-01 RX ADMIN — ACYCLOVIR 400 MG: 200 CAPSULE ORAL at 17:13

## 2020-01-01 RX ADMIN — ALLOPURINOL 300 MG: 300 TABLET ORAL at 17:29

## 2020-01-01 RX ADMIN — MIDAZOLAM 1 MG: 1 INJECTION INTRAMUSCULAR; INTRAVENOUS at 09:50

## 2020-01-01 RX ADMIN — MIDAZOLAM 1 MG: 1 INJECTION INTRAMUSCULAR; INTRAVENOUS at 09:46

## 2020-01-01 RX ADMIN — Medication 10 ML: at 17:35

## 2020-01-01 RX ADMIN — Medication 10 ML: at 16:56

## 2020-01-01 RX ADMIN — ETOPOSIDE 198 MG: 20 INJECTION INTRAVENOUS at 16:01

## 2020-01-01 RX ADMIN — ALLOPURINOL 300 MG: 300 TABLET ORAL at 17:37

## 2020-01-01 RX ADMIN — ALLOPURINOL 300 MG: 300 TABLET ORAL at 09:17

## 2020-01-01 RX ADMIN — ACYCLOVIR 400 MG: 200 CAPSULE ORAL at 08:50

## 2020-01-01 RX ADMIN — ACYCLOVIR 400 MG: 200 CAPSULE ORAL at 09:23

## 2020-10-07 NOTE — PROGRESS NOTES
10/7/20 - Patient here for new patient visit. Patient with known DLBCL that has been difficult to treat. We will have CT CAP, repeat liver biopsy with hopefully adequate samples for extensive testing, echo and labs. Path from last liver biopsy will be requested from [facility] for review internally and will attempt to obtain PET records as well to upload to our system. Once this is completed, patient will follow up with Dr. Chuck Handy and likely be admitted for chemo inpatient prior to likely ASCT. Navigation information provided to patient and all questions answered.

## 2020-10-13 NOTE — PROCEDURES
Department of Interventional Radiology  [phone]        Interventional Radiology Brief Procedure Note    Patient: Vicente Vogt MRN: [id number]  SSN: [id number]    YOB: 1970  Age: 48 y.o.   Sex: female      Date of Procedure: 10/13/2020    Pre-Procedure Diagnosis: liver mass    Post-Procedure Diagnosis: SAME    Procedure(s): Image Guided Biopsy    Brief Description of Procedure: as above    Performed By: Coreen Altamirano MD     Assistants: None    Anesthesia:Moderate Sedation    Estimated Blood Loss: Less than 10ml    Specimens:  Pathology    Implants:  None    Findings: left lobe lesion    Complications: None    Recommendations: routine post care     Follow Up: as needed    Signed By: Coreen Altamirano MD     October 13, 2020

## 2020-10-13 NOTE — PROGRESS NOTES
Recovery period without difficulty. Pt alert and oriented and denies pain. Dressing is clean, dry, and intact. Reviewed discharge instructions with patient, verbalized understanding. Pt escorted to lobby discharge area via wheelchair. Vital signs and Jairo score completed.

## 2020-10-13 NOTE — H&P
Department of Interventional Radiology  [phone]    History and Physical    Patient:  Faye Hidalgo MRN:  [id number]  SSN:  [id number]    YOB: 1970  Age:  48 y.o. Sex:  female      Primary Care Provider:  Shasta Pearson MD  Referring Physician:  Venus Fleischer, MD    Subjective:     Chief Complaint: lymphoma    History of the Present Illness: The patient is a 48 y.o. female with lymphoma who presents for biopsy of liver lesions. Previous biopsy at [facility], requesting more tissue. NPO. Past Medical History:   Diagnosis Date    Cancer ([facility] [address]     non hodgkins lymphoma    Contact dermatitis and eczema due to cause     Lupus (HCC)     Rheumatic arteritis      Past Surgical History:   Procedure Laterality Date    HX COLONOSCOPY          Review of Systems:    Pertinent items are noted in the History of Present Illness. Prior to Admission medications    Medication Sig Start Date End Date Taking? Authorizing Provider   meloxicam (MOBIC) 15 mg tablet meloxicam 15 mg tablet   1 tablet daily 12/28/19  Yes Provider, Historical   triamcinolone acetonide (KENALOG) 0.1 % topical cream triamcinolone acetonide 0.1 % topical cream   apply to affected area twice daily 2 week on/ 2 weeks off as needed. 1/22/20  Yes Provider, Historical   allopurinoL (ZYLOPRIM) 300 mg tablet allopurinol 300 mg tablet   1 tablet by mouth 2 times daily 10/6/20  Yes Provider, Historical   amLODIPine (Norvasc) 10 mg tablet Norvasc 10 mg tablet   Norvasc 10MG, 1 (one) Tablet Tablet daily # 30, 03/07/2014, Ref. x3. Active 11/30/19  Yes Provider, Historical   aspirin delayed-release 81 mg tablet Take 81 mg by mouth daily.    Yes Provider, Historical   calcipotriene (DOVONEX) 0.005 % topical cream calcipotriene 0.005 % topical cream   1 application topicall 2 times a day to legs 12/28/19  Yes Provider, Historical   clobetasoL (TEMOVATE) 0.05 % ointment clobetasol 0.05 % topical ointment   apply twice daily for 2 weeks on and 2 weeks off 6/30/20  Yes Provider, Historical   folic acid (FOLVITE) 1 mg tablet folic acid 1 mg tablet   1 tablet daily   Yes Provider, Historical   hydrOXYchloroQUINE (PLAQUENIL) 200 mg tablet hydroxychloroquine 200 mg tablet 3/3/20  Yes Provider, Historical   ketoconazole (NIZORAL) 2 % topical cream ketoconazole 2 % topical cream   1 application topically 2 times a day as needed for rash 1/22/20  Yes Provider, Historical   lidocaine-prilocaine (EMLA) topical cream Apply  to affected area as needed. 3/27/20  Yes Provider, Historical   ondansetron hcl (ZOFRAN) 4 mg tablet ondansetron HCl 4 mg tablet   1 tablet by mouth 3 times a day as a needed for nausea or vomiting 3/2/20   Provider, Historical   promethazine (PHENERGAN) 12.5 mg tablet promethazine 12.5 mg tablet   1 tablet by mouth every 8 hours as needed for nausea and vomiting.  3/2/20   Provider, Historical   predniSONE (DELTASONE) 50 mg tablet prednisone 50 mg tablet   take 2 tablets by mouth daily with breakfast 8/21/20   Provider, Historical        No Known Allergies    Family History   Problem Relation Age of Onset    No Known Problems Mother     Diabetes Father     Heart Disease Father     No Known Problems Brother     No Known Problems Brother      Social History     Tobacco Use    Smoking status: Never Smoker    Smokeless tobacco: Never Used   Substance Use Topics    Alcohol use: Not Currently     Frequency: Never        Objective:       Physical Examination:    Vitals:    10/13/20 0740 10/13/20 0742   BP:  110/72   Pulse:  92   Resp:  18   Temp:  98.7 °F (37.1 °C)   SpO2:  100%   Weight: 88.5 kg (195 lb)    Height: 5' 4\" (1.626 m)        Pain Assessment  Pain Intensity 1: 0 (10/13/20 0740)        Patient Stated Pain Goal: 0      HEART: regular rate and rhythm  LUNG: clear to auscultation bilaterally  ABDOMEN: normal findings: soft, non-tender  EXTREMITIES: normal strength, tone, and muscle mass    Laboratory:     Lab Results   Component Value Date/Time    Sodium 141 10/07/2020 02:15 PM    Potassium 3.8 10/07/2020 02:15 PM    Chloride 105 10/07/2020 02:15 PM    CO2 33 (H) 10/07/2020 02:15 PM    Anion gap 3 (L) 10/07/2020 02:15 PM    Glucose 103 (H) 10/07/2020 02:15 PM    BUN 7 10/07/2020 02:15 PM    Creatinine 0.50 (L) 10/07/2020 02:15 PM    GFR est AA >60 10/07/2020 02:15 PM    GFR est non-AA >60 10/07/2020 02:15 PM    Calcium 9.8 10/07/2020 02:15 PM    Magnesium 1.7 (L) 10/07/2020 02:15 PM    Albumin 3.3 (L) 10/07/2020 02:15 PM    Protein, total 7.8 10/07/2020 02:15 PM    Globulin 4.5 (H) 10/07/2020 02:15 PM    A-G Ratio 0.7 (L) 10/07/2020 02:15 PM    ALT (SGPT) 27 10/07/2020 02:15 PM     Lab Results   Component Value Date/Time    WBC 3.9 (L) 10/07/2020 02:15 PM    HGB 9.1 (L) 10/07/2020 02:15 PM    HCT 29.5 (L) 10/07/2020 02:15 PM    PLATELET 893 36/48/9671 02:15 PM     No results found for: APTT, PTP, INR, INREXT    Assessment:     Lymphoma, liver mets        Plan:     Planned Procedure:  Liver mass biopsy    Risks, benefits, and alternatives reviewed with patient and she agrees to proceed with the procedure.       Signed By: Nivia Lizarraga PA-C     October 13, 2020

## 2020-10-13 NOTE — DISCHARGE INSTRUCTIONS
[address]  Department of Interventional Radiology  [facility]  [phone] Office  [phone] Fax    BIOPSY DISCHARGE INSTRUCTIONS    General Instructions:     A biopsy is the removal of a small piece of tissue for microscopic examination or testing. Healthy tissue can be obtained for the purpose of tissue-type matching for transplants. Unhealthy tissues are more commonly biopsied to diagnose disease. Lung Biopsy:     A needle lung biopsy is performed when there is a mass discovered in the lung area. The most serious risk is infection, bleeding, and pneumothorax (a collapsed lung). Signs of pneumothorax include shortness of breath, rapid heart rate, and blueness of the skin. If any of these occur, call 911. Liver Biopsy: This test helps detect cancer, infections, and the cause of an enlargement of the liver or elevated liver enzymes. It also helps to diagnose a number of liver diseases. The pain associated with the procedure may be felt in the shoulder. The risks include internal bleeding, pneumothorax, and injury to the surrounding organs. Renal Biopsy: This procedure is sometimes done to evaluate a transplanted kidney. It is also used to evaluate unexplained decrease in kidney function, blood, or protein in the urine. You may see bright red blood in the urine the first 24 hours after the test. If the bleeding lasts longer, you need to call your doctor. There is a risk of infection and bleeding into the muscle, which may cause soreness. Spinal Biopsy: This test is sometimes done in conjunction with other procedures. Your back will be sore, as we are taking a small sample of bone, which is slightly more difficult to sample than tissue. General Biopsy:     A mass can grow in any area of the body, and we are taking a specimen as ordered by your doctor. The risks are the same.  They include bleeding, pain, and infection. Home Care Instructions: You may resume your regular diet and medication regimen. Do not drink alcohol, drive, or make any important legal decisions in the next 24 hours. Do not lift anything heavier than a gallon of milk until the soreness goes away. You may use over the counter acetaminophen or ibuprofen for the soreness. You may apply an ice pack to the affected area for 20-30 minutes at time for the first 24 hours. After that, you may apply a heat pack. Call If: You should call your Physician and/or the Radiology Nurse if you have any questions or concerns about the biopsy site. Call if you should have increased pain, fever, redness, drainage, or bleeding more than a small spot on the bandage. Follow-Up Instructions: Please see your ordering doctor as he/she has requested. To Reach Us: If you have any questions about your procedure, please call the Interventional Radiology department at [phone]. After business hours (5pm) and weekends, call the answering service at [phone] and ask for the Radiologist on call to be paged. Si tiene Preguntas acerca del procedimiento, por favor llame al departamento de Radiología Intervencional al [phone]. Después de horas de oficina (5 pm) y los fines de [place], llamar al [place] al [phone] y pregunte por el Radiologo de [facility]. Interventional Radiology General Nurse Discharge    After general anesthesia or intravenous sedation, for 24 hours or while taking prescription Narcotics:  · Limit your activities  · Do not drive and operate hazardous machinery  · Do not make important personal or business decisions  · Do  not drink alcoholic beverages  · If you have not urinated within 8 hours after discharge, please contact your surgeon on call. * Please give a list of your current medications to your Primary Care Provider.   * Please update this list whenever your medications are discontinued, doses are changed, or new medications (including over-the-counter products) are added. * Please carry medication information at all times in case of emergency situations. These are general instructions for a healthy lifestyle:    No smoking/ No tobacco products/ Avoid exposure to second hand smoke  Surgeon General's Warning:  Quitting smoking now greatly reduces serious risk to your health. Obesity, smoking, and sedentary lifestyle greatly increases your risk for illness  A healthy diet, regular physical exercise & weight monitoring are important for maintaining a healthy lifestyle    You may be retaining fluid if you have a history of heart failure or if you experience any of the following symptoms:  Weight gain of 3 pounds or more overnight or 5 pounds in a week, increased swelling in our hands or feet or shortness of breath while lying flat in bed. Please call your doctor as soon as you notice any of these symptoms; do not wait until your next office visit. Recognize signs and symptoms of STROKE:  F-face looks uneven    A-arms unable to move or move unevenly    S-speech slurred or non-existent    T-time-call 911 as soon as signs and symptoms begin-DO NOT go       Back to bed or wait to see if you get better-TIME IS BRAIN.       Patient Signature:  Date: 10/13/2020  Discharging Nurse: Jose Antonio Zavala

## 2020-10-21 PROBLEM — C83.30 DLBCL (DIFFUSE LARGE B CELL LYMPHOMA) (HCC): Status: ACTIVE | Noted: 2020-01-01

## 2020-10-22 PROBLEM — C83.30 DIFFUSE LARGE B CELL LYMPHOMA (HCC): Status: ACTIVE | Noted: 2020-01-01

## 2020-10-22 PROBLEM — Z51.11 ADMISSION FOR ANTINEOPLASTIC CHEMOTHERAPY: Status: ACTIVE | Noted: 2020-01-01

## 2020-10-22 NOTE — H&P
History and Physical 
 
     
 
Inpatient Hematology / Oncology History and Physical 
 
Reason for Asmission:  Diffuse large B cell lymphoma ([address] [C83.30]; Admission for antineoplastic chemotherapy [Z51.11] History of Present Illness: Ms. Heather Broussard is a 48 y.o. female admitted on 10/22/2020 with a primary diagnosis of The encounter diagnosis was Diffuse large B-cell lymphoma of spleen ([address] .   
 
48 female, , non-smoker, history of DM, hypertension, lupus/RA (Dr Dolly Romero, on meloxicam, prednisone and plaquenil prn), distant history of b/l LE DVTs (multiple yrs ago), tubal ligation, w/ relapsed/refractory DLBCL under care of Dr Abbey Bird. She is s/p RCHOP x 8, initially responding disease however scans w/ disease progression after 8 cycles. Seen today in follow-up 10/22/2020: Reports ED visit last Wednesday with fever which self resolved, did not need any antibiotics. Reportedly Covid and flu was negative. Has since been afebrile, doing well. Today denies any new symptoms. Extensive ROS unremarkable today. Recent labs with CBC unremarkable, creatinine normal range, of note alk phos and LDH elevated. Uric acid 3.1. Hepatitis panel negative. HIV negative. Will place on allopurinol 300 twice daily. Recent 2D echo with unremarkable EF. Recent CT CAP slightly worse from the last PET scan with significant disease involving liver and spleen (imaging personally reviewed). Her initial outside pathology was internally read consistent with DLBCL, ABC type with CD30 positivity. More recent liver biopsy consistent with relapsed disease. Will admit for ObinutuzumabICE. We will also plan for prophylactic IT methotrexate. G-CSF support on discharge. Review of Systems: As mentioned above. All other systems reviewed in full and are unremarkable. No Known Allergies Past Medical History:  
Diagnosis Date  Cancer ([facility]   
 non hodgkins lymphoma  Contact dermatitis and eczema due to cause  Lupus ([facility]  Rheumatic arteritis Past Surgical History:  
Procedure Laterality Date  HX COLONOSCOPY Family History Problem Relation Age of Onset  No Known Problems Mother  Diabetes Father  Heart Disease Father  No Known Problems Brother  No Known Problems Brother Social History Socioeconomic History  Marital status:  Spouse name: Not on file  Number of children: Not on file  Years of education: Not on file  Highest education level: Not on file Occupational History  Not on file Social Needs  Financial resource strain: Not on file  Food insecurity Worry: Not on file Inability: Not on file  Transportation needs Medical: Not on file Non-medical: Not on file Tobacco Use  Smoking status: Never Smoker  Smokeless tobacco: Never Used Substance and Sexual Activity  Alcohol use: Not Currently Frequency: Never  Drug use: Never  Sexual activity: Yes  
  Partners: Male Birth control/protection: None Lifestyle  Physical activity Days per week: Not on file Minutes per session: Not on file  Stress: Not on file Relationships  Social connections Talks on phone: Not on file Gets together: Not on file Attends Protestant service: Not on file Active member of club or organization: Not on file Attends meetings of clubs or organizations: Not on file Relationship status: Not on file  Intimate partner violence Fear of current or ex partner: Not on file Emotionally abused: Not on file Physically abused: Not on file Forced sexual activity: Not on file Other Topics Concern [address] Service Not Asked  Blood Transfusions Not Asked  Caffeine Concern Not Asked  Occupational Exposure Not Asked [employer] Hazards Not Asked  Sleep Concern Not Asked  Stress Concern Not Asked  Weight Concern Not Asked  Special Diet Not Asked  Back Care Not Asked  Exercise Not Asked  Bike Helmet Not Asked  Seat Belt Not Asked  Self-Exams Not Asked Social History Narrative  Not on file Current Facility-Administered Medications Medication Dose Route Frequency Provider Last Rate Last Dose  [START ON 10/23/2020] allopurinoL (ZYLOPRIM) tablet 300 mg  300 mg Oral DAILY [facility], NP      
 [START ON 10/23/2020] amLODIPine (NORVASC) tablet 10 mg  10 mg Oral DAILY [facility], NP      
 [START ON 79/13/5144] folic acid (FOLVITE) tablet 1 mg  1 mg Oral DAILY [facility], NP      
 lidocaine-prilocaine (EMLA) 2.5-2.5 % cream   Topical PRN Sherra Service, NP      
 [START ON 10/23/2020] meloxicam (MOBIC) tablet 15 mg  15 mg Oral DAILY Sherra Service, NP      
 ondansetron (ZOFRAN ODT) tablet 8 mg  8 mg Oral Q8H PRN Sherra Service, NP      
 promethazine (PHENERGAN) tablet 25 mg  25 mg Oral Q6H PRN Sherra Service, NP      
 0.9% sodium chloride infusion  75 mL/hr IntraVENous CONTINUOUS [facility], NP   Stopped at 10/22/20 1400  enoxaparin (LOVENOX) injection 40 mg  40 mg SubCUTAneous Q24H [facility], NP   Stopped at 10/22/20 3589  sodium chloride 0.9 % bolus infusion 500 mL  500 mL IntraVENous ONCE Debi Waterman MD      
 0.9% sodium chloride infusion  25 mL/hr IntraVENous CONTINUOUS Debi Waterman MD      
 acetaminophen (TYLENOL) tablet 650 mg  650 mg Oral Q24H Debi Waterman MD      
 diphenhydrAMINE (BENADRYL) injection 50 mg  50 mg IntraVENous Q24H Debi Waterman MD      
 dexamethasone (DECADRON) 20 mg in 0.9% sodium chloride 50 mL IVPB  20 mg IntraVENous Q24H Debi Waterman MD      
 obinutuzumab (GAZYVA) 100 mg in 0.9% sodium chloride 100 mL IVPB  100 mg IntraVENous ONCE Debi Waterman MD      
 
 
OBJECTIVE: 
Patient Vitals for the past 8 hrs: 
 BP Temp Pulse Resp SpO2 Height Weight 10/22/20 1537 117/84 99 °F (37.2 °C) 99 18 100 %   10/22/20 1247 109/65 98.2 °F (36.8 °C) (!) 110 18 100 % 5' 4\" (1.626 m) 190 lb 6.4 oz (86.4 kg) Temp (24hrs), Av.6 °F (37 °C), Min:98.2 °F (36.8 °C), Max:99 °F (37.2 °C) No intake/output data recorded. Physical Exam: 
Constitutional: Well developed, well nourished female in no acute distress, sitting comfortably in the hospital bed. HEENT: Normocephalic and atraumatic. Oropharynx is clear, mucous membranes are moist.  Pupils are equal, round, and reactive to light. Extraocular muscles are intact. Sclerae anicteric. Neck supple without JVD. No thyromegaly present. Lymph node No palpable submandibular, cervical, supraclavicular, axillary or inguinal lymph nodes. Skin Warm and dry. No bruising and no rash noted. No erythema. No pallor. Respiratory Lungs are clear to auscultation bilaterally without wheezes, rales or rhonchi, normal air exchange without accessory muscle use. CVS Normal rate, regular rhythm and normal S1 and S2. No murmurs, gallops, or rubs. Abdomen Soft, nontender and nondistended, normoactive bowel sounds. No palpable mass. No hepatosplenomegaly. Neuro Grossly nonfocal with no obvious sensory or motor deficits. MSK Normal range of motion in general.  No edema and no tenderness. Psych Appropriate mood and affect. Labs:   
Recent Results (from the past 24 hour(s)) CBC WITH AUTOMATED DIFF Collection Time: 10/22/20  9:10 AM  
Result Value Ref Range WBC 3.8 (L) 4.3 - 11.1 K/uL  
 RBC 3.23 (L) 4.05 - 5.25 M/uL HGB 8.4 (L) 11.7 - 15.4 g/dL HCT 27.7 (L) 35.8 - 46.3 % MCV 85.8 79.6 - 97.8 FL  
 MCH 26.0 (L) 26.1 - 32.9 PG  
 MCHC 30.3 (L) 31.4 - 35.0 g/dL  
 RDW 17.0 (H) 11.9 - 14.6 % PLATELET 966 864 - 500 K/uL MPV 10.3 9.4 - 12.3 FL ABSOLUTE NRBC 0.00 0.0 - 0.2 K/uL  
 DF AUTOMATED NEUTROPHILS 68 43 - 78 % LYMPHOCYTES 5 (L) 13 - 44 % MONOCYTES 16 (H) 4.0 - 12.0 % EOSINOPHILS 8 (H) 0.5 - 7.8 % BASOPHILS 1 0.0 - 2.0 % IMMATURE GRANULOCYTES 1 0.0 - 5.0 %  
 ABS. NEUTROPHILS 2.6 1.7 - 8.2 K/UL  
 ABS. LYMPHOCYTES 0.2 (L) 0.5 - 4.6 K/UL  
 ABS. MONOCYTES 0.6 0.1 - 1.3 K/UL  
 ABS. EOSINOPHILS 0.3 0.0 - 0.8 K/UL  
 ABS. BASOPHILS 0.0 0.0 - 0.2 K/UL  
 ABS. IMM. GRANS. 0.1 0.0 - 0.5 K/UL METABOLIC PANEL, COMPREHENSIVE Collection Time: 10/22/20  9:10 AM  
Result Value Ref Range Sodium 138 136 - 145 mmol/L Potassium 4.1 3.5 - 5.1 mmol/L Chloride 104 98 - 107 mmol/L  
 CO2 29 21 - 32 mmol/L Anion gap 5 (L) 7 - 16 mmol/L Glucose 127 (H) 65 - 100 mg/dL BUN 7 6 - 23 MG/DL Creatinine 0.60 0.6 - 1.0 MG/DL  
 GFR est AA >60 >60 ml/min/1.73m2 GFR est non-AA >60 >60 ml/min/1.73m2 Calcium 9.3 8.3 - 10.4 MG/DL Bilirubin, total 0.9 0.2 - 1.1 MG/DL  
 ALT (SGPT) 53 12 - 65 U/L  
 AST (SGOT) 85 (H) 15 - 37 U/L Alk. phosphatase 578 (H) 50 - 136 U/L Protein, total 7.8 6.3 - 8.2 g/dL Albumin 3.0 (L) 3.5 - 5.0 g/dL Globulin 4.8 (H) 2.3 - 3.5 g/dL A-G Ratio 0.6 (L) 1.2 - 3.5 LD Collection Time: 10/22/20  9:10 AM  
Result Value Ref Range LD 1,121 (H) 100 - 839 U/L  
METABOLIC PANEL, COMPREHENSIVE Collection Time: 10/22/20  2:45 PM  
Result Value Ref Range Sodium 140 136 - 145 mmol/L Potassium 3.9 3.5 - 5.1 mmol/L Chloride 104 98 - 107 mmol/L  
 CO2 30 21 - 32 mmol/L Anion gap 6 (L) 7 - 16 mmol/L Glucose 112 (H) 65 - 100 mg/dL BUN 6 6 - 23 MG/DL Creatinine 0.55 (L) 0.6 - 1.0 MG/DL  
 GFR est AA >60 >60 ml/min/1.73m2 GFR est non-AA >60 >60 ml/min/1.73m2 Calcium 9.3 8.3 - 10.4 MG/DL Bilirubin, total 0.7 0.2 - 1.1 MG/DL  
 ALT (SGPT) 50 12 - 65 U/L  
 AST (SGOT) 81 (H) 15 - 37 U/L Alk. phosphatase 620 (H) 50 - 130 U/L Protein, total 7.6 6.3 - 8.2 g/dL Albumin 2.8 (L) 3.5 - 5.0 g/dL Globulin 4.8 (H) 2.3 - 3.5 g/dL A-G Ratio 0.6 (L) 1.2 - 3.5 MAGNESIUM Collection Time: 10/22/20  2:45 PM  
Result Value Ref Range Magnesium 2.1 1.8 - 2.4 mg/dL CBC WITH AUTOMATED DIFF Collection Time: 10/22/20  2:45 PM  
Result Value Ref Range WBC 3.4 (L) 4.3 - 11.1 K/uL  
 RBC 3.07 (L) 4.05 - 5.2 M/uL HGB 8.0 (L) 11.7 - 15.4 g/dL HCT 26.3 (L) 35.8 - 46.3 % MCV 85.7 79.6 - 97.8 FL  
 MCH 26.1 26.1 - 32.9 PG  
 MCHC 30.4 (L) 31.4 - 35.0 g/dL  
 RDW 17.0 (H) 11.9 - 14.6 % PLATELET 423 117 - 788 K/uL MPV 10.8 9.4 - 12.3 FL ABSOLUTE NRBC 0.02 0.0 - 0.2 K/uL  
 DF AUTOMATED NEUTROPHILS 68 43 - 78 % LYMPHOCYTES 5 (L) 13 - 44 % MONOCYTES 20 (H) 4.0 - 12.0 % EOSINOPHILS 5 0.5 - 7.8 % BASOPHILS 1 0.0 - 2.0 % IMMATURE GRANULOCYTES 2 0.0 - 5.0 %  
 ABS. NEUTROPHILS 2.3 1.7 - 8.2 K/UL  
 ABS. LYMPHOCYTES 0.2 (L) 0.5 - 4.6 K/UL  
 ABS. MONOCYTES 0.7 0.1 - 1.3 K/UL  
 ABS. EOSINOPHILS 0.2 0.0 - 0.8 K/UL  
 ABS. BASOPHILS 0.0 0.0 - 0.2 K/UL  
 ABS. IMM. GRANS. 0.1 0.0 - 0.5 K/UL Imaging: No images are attached to the encounter. ASSESSMENT: 
Problem List  Never Reviewed Codes Class Noted Admission for antineoplastic chemotherapy ICD-10-CM: Z51.11 ICD-9-CM: V58.11  10/22/2020 Diffuse large B cell lymphoma (HCC) ICD-10-CM: C83.30 ICD-9-CM: 202.80  10/22/2020 DLBCL (diffuse large B cell lymphoma) (HCC) ICD-10-CM: C83.30 ICD-9-CM: 202.80  10/21/2020  
   
  
 
 
 
48 female, , non-smoker, history of DM, hypertension, lupus/RA (Dr Lapine Ko, on meloxicam, prednisone and plaquenil prn), distant history of b/l LE DVTs (multiple yrs ago), tubal ligation, w/ relapsed/refractory DLBCL under care of Dr Vladislav Dwyer. She is s/p RCHOP x 8, initially responding disease however scans w/ disease progression after 8 cycles. Seen today in follow-up 10/22/2020: Reports ED visit last Wednesday with fever which self resolved, did not need any antibiotics. Reportedly Covid and flu was negative. Has since been afebrile, doing well. Today denies any new symptoms.   Extensive ROS unremarkable today. Recent labs with CBC unremarkable, creatinine normal range, of note alk phos and LDH elevated. Uric acid 3.1. Hepatitis panel negative. HIV negative. Will place on allopurinol 300 twice daily. Recent 2D echo with unremarkable EF. Recent CT CAP slightly worse from the last PET scan with significant disease involving liver and spleen (imaging personally reviewed). Her initial outside pathology was internally read consistent with DLBCL, ABC type with CD30 positivity. More recent liver biopsy consistent with relapsed disease. Will admit for ObinutuzumabICE. We will also plan for prophylactic IT methotrexate. G-CSF support on discharge. 1. Recurrent DLBCL ABC subtype, presenting as stage IVB disease, CD30+ve (significant involvement of liver and spleen) PLAN: 
- As above. Start Obinutuzumab-ICE. Udenyca support. - IT MTX prophylaxis x 5 - Allopurinol 300 twice daily - Look into enrollment in [id number] with ibrutinib in association w ASCT. She will need to be on minimal steroids for this Follow-up in clinic for tox check within a week of discharge. Twice weekly labs with transfusions as needed on discharge. Lab studies and imaging studies (CT/CXR) were personally reviewed. Pertinent old records were reviewed. Thank you for allowing us to participate in the care of Ms. Garold Habermann. Willy Bernard MD 
[address] [place] [address] Office : [phone] Fax : [phone]

## 2020-10-22 NOTE — PROGRESS NOTES
10/22/20 Pt came in for a follow up with Dr. Gege Domínguez. Her echo looked good, but PET scan still showed her disease in her liver and spleen. We want her to start treatment sooner rather than later. Dr. Gege Domínguez was her to get 3 cycles of RICE with IT MTX added at the end of each cycle. Pt agreed to be admitted to [address] [place] [address] today to get a jump start on her chemo.  While IP pt will also be receiving Allopurinol 300mg BID per MD.

## 2020-10-23 NOTE — PROGRESS NOTES
TRANSFER - OUT REPORT:    Verbal report given to [facility] on Faye Hidalgo  being transferred to IR room #8 for routine progression of care       Report consisted of patients Situation, Background, Assessment and   Recommendations(SBAR). Information from the following report(s) SBAR, Kardex, Procedure Summary and MAR was reviewed with the receiving nurse. Lines:   Venous Access Device 10/22/20 Upper chest (subclavicular area), left (Active)   Central Line Being Utilized Yes 10/23/20 0743   Criteria for Appropriate Use Irritant/vesicant medication 10/23/20 0743   Site Assessment Clean, dry, & intact 10/23/20 0743   Date of Last Dressing Change 10/22/20 10/23/20 0743   Dressing Status Clean, dry, & intact 10/23/20 0743   Dressing Type Disk with Chlorhexadine gluconate (CHG); Transparent 10/23/20 0743   Action Taken Blood drawn 10/23/20 0347   Date Accessed (Medial Site) 10/22/20 10/23/20 0743   Access Time (Medial Site) 2052 10/22/20 2052   Access Needle Size (Site #1) 20 G 10/22/20 2052   Access Needle Length (Medial Site) 0.75 inches 10/22/20 2052   Positive Blood Return (Medial Site) No 10/23/20 0743   Action Taken (Medial Site) Flushed 10/23/20 0743   Alcohol Cap Used No 10/23/20 0743        Opportunity for questions and clarification was provided.       Patient transported with:   Registered Nurse

## 2020-10-23 NOTE — PROGRESS NOTES
TRANSFER - OUT REPORT:    Verbal report given to LAURA Rodriguez on Jim Vásquez  being transferred to [facility] room 355 for routine progression of care       Report consisted of patients Situation, Background, Assessment and   Recommendations(SBAR). Information from the following report(s) SBAR, Kardex, Procedure Summary and MAR was reviewed with the receiving nurse. Lines:   Venous Access Device 10/22/20 Upper chest (subclavicular area), left (Active)   Central Line Being Utilized Yes 10/23/20 0743   Criteria for Appropriate Use Irritant/vesicant medication 10/23/20 0743   Site Assessment Clean, dry, & intact 10/23/20 0743   Date of Last Dressing Change 10/22/20 10/23/20 0743   Dressing Status Clean, dry, & intact 10/23/20 0743   Dressing Type Disk with Chlorhexadine gluconate (CHG); Transparent 10/23/20 0743   Action Taken Blood drawn 10/23/20 0347   Date Accessed (Medial Site) 10/22/20 10/23/20 0743   Access Time (Medial Site) 2052 10/22/20 2052   Access Needle Size (Site #1) 20 G 10/22/20 2052   Access Needle Length (Medial Site) 0.75 inches 10/22/20 2052   Positive Blood Return (Medial Site) No 10/23/20 0743   Action Taken (Medial Site) Flushed 10/23/20 0743   Alcohol Cap Used No 10/23/20 0743        Opportunity for questions and clarification was provided.       Patient transported with:   [facility]

## 2020-10-23 NOTE — PROGRESS NOTES
[facility] Hematology & Oncology Inpatient Hematology / Oncology Progress Note Admission Date: 10/22/2020 12:49 PM 
Reason for Admission/Hospital Course: Diffuse large B cell lymphoma ([address] [C83.30] Admission for antineoplastic chemotherapy [Z51.11] 24 Hour Events: 
IT chemo today Port not working/cannot use PICC line placed ROS: 
Constitutional: negative for fever, chills, weakness, malaise, fatigue. CV: negative for chest pain, palpitations, edema. Respiratory: negative for dyspnea, cough, wheezing. GI: negative for nausea, abdominal pain, diarrhea. 10 point review of systems is otherwise negative with the exception of the elements mentioned above in the HPI. No Known Allergies OBJECTIVE: 
Patient Vitals for the past 8 hrs: 
 BP Temp Pulse Resp SpO2  
10/23/20 0731 121/85 98 °F (36.7 °C) 89 18 100 % Temp (24hrs), Av.3 °F (36.8 °C), Min:97.6 °F (36.4 °C), Max:99 °F (37.2 °C) No intake/output data recorded. Physical Exam: 
Constitutional: Well developed, well nourished female in no acute distress, sitting comfortably in the hospital bed. HEENT: Normocephalic and atraumatic. Oropharynx is clear, mucous membranes are moist.  Pupils are equal, round, and reactive to light. Extraocular muscles are intact. Sclerae anicteric. Skin Warm and dry. No bruising and no rash noted. No erythema. No pallor. Respiratory Lungs are clear to auscultation bilaterally without wheezes, rales or rhonchi, normal air exchange without accessory muscle use. CVS Normal rate, regular rhythm and normal S1 and S2. No murmurs, gallops, or rubs. Abdomen Soft, nontender and nondistended, normoactive bowel sounds. No palpable mass. No hepatosplenomegaly. Neuro Grossly nonfocal with no obvious sensory or motor deficits. MSK Normal range of motion in general.  No edema and no tenderness. Psych Appropriate mood and affect. Labs: 
   
Recent Labs 10/23/20 3907 10/22/20 
1445 10/22/20 
7592 WBC 2.8* 3.4* 3.8*  
RBC 3.01* 3.07* 3.23* HGB 8.0* 8.0* 8.4* HCT 26.5* 26.3* 27.7* MCV 88.0 85.7 85.8 MCH 26.6 26.1 26.0*  
MCHC 30.2* 30.4* 30.3*  
RDW 17.1* 17.0* 17.0*  
 397 446 GRANS 89* 68 68 LYMPH 3* 5* 5*  
MONOS 7 20* 16* EOS  --  5 8* BASOS  --  1 1 IG  --  2 1 DF MANUAL AUTOMATED AUTOMATED ANEU 2.5 2.3 2.6 ABL 0.1* 0.2* 0.2* ABM 0.2 0.7 0.6 TYE  --  0.2 0.3 ABB  --  0.0 0.0 AIG  --  0.1 0.1 Recent Labs 10/23/20 
0510 10/22/20 
1445 10/22/20 
9760  140 138  
K 4.6 3.9 4.1  104 104 CO2 28 30 29 AGAP 7 6* 5* * 112* 127* BUN 6 6 7 CREA 0.52* 0.55* 0.60 GFRAA >60 >60 >60 GFRNA >60 >60 >60  
CA 9.1 9.3 9.3 * 620* 578* TP 7.6 7.6 7.8 ALB 2.8* 2.8* 3.0*  
GLOB 4.8* 4.8* 4.8* AGRAT 0.6* 0.6* 0.6* MG 1.9 2.1  --   
 
 
 
Imaging: 
 
Medications: No current facility-administered medications for this encounter. Current Outpatient Medications Medication Sig  
 meloxicam (MOBIC) 15 mg tablet meloxicam 15 mg tablet 1 tablet daily  triamcinolone acetonide (KENALOG) 0.1 % topical cream triamcinolone acetonide 0.1 % topical cream 
 apply to affected area twice daily 2 week on/ 2 weeks off as needed.  allopurinoL (ZYLOPRIM) 300 mg tablet allopurinol 300 mg tablet 1 tablet by mouth 2 times daily  amLODIPine (Norvasc) 10 mg tablet Norvasc 10 mg tablet Norvasc 10MG, 1 (one) Tablet Tablet daily # 30, 03/07/2014, Ref. x3. Active  aspirin delayed-release 81 mg tablet Take 81 mg by mouth daily.  calcipotriene (DOVONEX) 0.005 % topical cream calcipotriene 0.005 % topical cream 
 1 application topicall 2 times a day to legs  clobetasoL (TEMOVATE) 0.05 % ointment clobetasol 0.05 % topical ointment 
 apply twice daily for 2 weeks on and 2 weeks off  folic acid (FOLVITE) 1 mg tablet folic acid 1 mg tablet 1 tablet daily  hydrOXYchloroQUINE (PLAQUENIL) 200 mg tablet hydroxychloroquine 200 mg tablet  ketoconazole (NIZORAL) 2 % topical cream ketoconazole 2 % topical cream 
 1 application topically 2 times a day as needed for rash  lidocaine-prilocaine (EMLA) topical cream Apply  to affected area as needed.  ondansetron hcl (ZOFRAN) 4 mg tablet ondansetron HCl 4 mg tablet 1 tablet by mouth 3 times a day as a needed for nausea or vomiting  promethazine (PHENERGAN) 12.5 mg tablet promethazine 12.5 mg tablet 1 tablet by mouth every 8 hours as needed for nausea and vomiting.  predniSONE (DELTASONE) 50 mg tablet prednisone 50 mg tablet 
 take 2 tablets by mouth daily with breakfast  
 
 
 
ASSESSMENT: 
 
Problem List  Never Reviewed Codes Class Noted Admission for antineoplastic chemotherapy ICD-10-CM: Z51.11 ICD-9-CM: V58.11  10/22/2020 Diffuse large B cell lymphoma (HCC) ICD-10-CM: C83.30 ICD-9-CM: 202.80  10/22/2020 DLBCL (diffuse large B cell lymphoma) (HCC) ICD-10-CM: C83.30 ICD-9-CM: 202.80  10/21/2020 Ms. Aruna Jarquin is a 48 y.o. female admitted on 10/22/2020 with a primary diagnosis of The encounter diagnosis was Diffuse large B-cell lymphoma of spleen ([facility] .   
  
48 female, , non-smoker, history of DM, hypertension, lupus/RA (Dr Nathan Khoury, on meloxicam, prednisone and plaquenil prn), distant history of b/l LE DVTs (multiple yrs ago), tubal ligation, w/ relapsed/refractory DLBCL under care of Dr Mc Bush. She is s/p RCHOP x 8, initially responding disease however scans w/ disease progression after 8 cycles. Seen today in follow-up 10/22/2020: Reports ED visit last Wednesday with fever which self resolved, did not need any antibiotics. Reportedly Covid and flu was negative. Has since been afebrile, doing well. Today denies any new symptoms. Extensive ROS unremarkable today.   Recent labs with CBC unremarkable, creatinine normal range, of note alk phos and LDH elevated. Uric acid 3.1. Hepatitis panel negative. HIV negative. Will place on allopurinol 300 twice daily. Recent 2D echo with unremarkable EF. Recent CT CAP slightly worse from the last PET scan with significant disease involving liver and spleen (imaging personally reviewed). Her initial outside pathology was internally read consistent with DLBCL, ABC type with CD30 positivity. More recent liver biopsy consistent with relapsed disease. Will admit for ObinutuzumabICE. We will also plan for prophylactic IT methotrexate. G-CSF support on discharge.   
  
 
PLAN: 
Recurrent DLBCL ABC subtype, presenting as stage IVB disease, CD30+ve (significant involvement of liver and spleen) - As above. Start Obinutuzumab-ICE. Udenyca support. - IT MTX prophylaxis x 5 - Allopurinol 300 twice daily - Look into enrollment in [id number] with ibrutinib in association w ASCT. She will need to be on minimal steroids for this 10/23 IT chemo today Port malfunction 10/23 Portagram in IR: Abnormally located left internal jugular vein chest port catheter. The tip of the catheter is either in an intramural location or within an occluded internal jugular vein. PICC placed in IR.  
  
 
Goals and plan of care reviewed with the patient. All questions answered to the best of our ability. Lebron Fischer NP [facility] Hematology & Oncology [address] [place] [address] Office : [phone] Fax : [phone] Attending Addendum: 
I have personally performed a face to face diagnostic evaluation on this patient.  I have reviewed and agree with the care plan as documented by Lebron Fischer, N.P. 36 minutes were spent on patient care, including but not limited to, reviewing the chart and time with the patient and family, more than 50% of the time documented was spent in face-to-face contact with the patient and in the care of the patient on the floor/unit where the patient is located. My findings are as follows: She has Diffuse Large B-Cell Lymphoma, appears anxious, heart rate regular without murmurs, abdomen is non-tender, bowel sounds are positive, we will arrange for her to undergo an LP with I/T chemotherapy and also start salvage chemotherapy. Alexia Donahue MD 
 
 
[facility] Hematology/Oncology [address] [place] [address] Office : [phone] Fax : [phone]

## 2020-10-23 NOTE — PROGRESS NOTES
End of Shift Note: 
 
- Pt received chemo via LAURA Lowery and tolerated well. - Pt's port was accessed but is still difficult to draw blood from. -  Pt is aware of going downtown [facility] at 9436 for further treatment. Report given to oncoming RN, Kaden Smalls.  
 
Tremaine Lao RN

## 2020-10-23 NOTE — PROCEDURES
Department of Interventional Radiology  [phone]        Interventional Radiology Brief Procedure Note    Patient: Naomi Mojica MRN: [id number]  SSN: [id number]    YOB: 1970  Age: 48 y.o. Sex: female      Date of Procedure: 10/23/2020    Pre-Procedure Diagnosis: lymphoma, chest port malfunction    Post-Procedure Diagnosis: SAME    Procedure(s): fluoroscopic guided chest port evaluation, Lumbar Puncture, intrathecal chemotherapy administration    Brief Description of Procedure: as above    Performed By: Dillon South PA-C / SAMMIE Kennedy MD    Assistants: None    Anesthesia:Lidocaine    Estimated Blood Loss: None    Specimens:  5 ml CSF    Implants:  None    Findings: chest port catheter tip terminates possibly in the left IJ vein wall or possibly a thrombosed IJ, or is not intraluminal.  Contrast courses retrograde into a collateral vein, then the subclavian vein    Complications: None    Recommendations: chest port should not be used and should be removed. Bedrest 4 hours today    Follow Up:  Oncology    Signed By: Dillon South PA-C     October 23, 2020

## 2020-10-23 NOTE — PROGRESS NOTES
Care Management Interventions Transition of Care Consult (CM Consult): Discharge Planning Current Support Network: Own Home, Lives with Spouse Confirm Follow Up Transport: Family Discharge Location Discharge Placement: Home Chart reviewed. Pt is being treated for lymphoma, was receiving treatment in [place] as that is her home, but her care became more involved so was referred to [facility]. Pt was admitted to receive chemo. Case management available in the event d/c needs arise.

## 2020-10-23 NOTE — PROGRESS NOTES
Procedure:LP with ITC. Approximately 5cc of fluid. CSF sample sent to lab. Pt tolerated without difficulty.

## 2020-10-23 NOTE — PROGRESS NOTES
PICC Placement Note PRE-PROCEDURE VERIFICATION Correct Procedure: yes. Time out completed with assistant Letitia Duffy  and all persons present in agreement with time out. Correct Site:  yes Temperature: Temp: 98 °F (36.7 °C), Temperature Source: Temp Source: Oral 
Recent Labs 10/23/20 
0510 10/23/20 
0355 BUN 6  --   
CREA 0.52*  --   
PLT  --  394 WBC  --  2.8* Allergies: Patient has no known allergies. Education materials for [facility] given to patient or family. PROCEDURE DETAIL A double lumen PICC line was started for chemo therapy. The following documentation is in addition to the PICC properties in the lines/airways flowsheet : 
Lot #: [id number] 
xylocaine used: yes Mid-Arm Circumference: 36 (cm) Internal Catheter Length: 40 (cm) Internal Catheter Total Length: 40 (cm) Vein Selection for PICC:right basilic Central Line Bundle followed yes Complication Related to Insertion: excessive bleeding at site, pressure dressing applied to be changed in 24 hours Both the insertion guidewire and ECG guidewire were removed intact all ports have positive blood return and were flush well with normal saline. The location of the tip of the PICC is verified using ECG technology. The tip is in the SVC per ECG reading. See image below. Line is okay to use: yes Kai Zapata RN VAT

## 2020-10-23 NOTE — PROGRESS NOTES
Left chest port accessed with 0.75\" needle via sterile technique. Flushed with 10 ml saline. Brisk blood return. Flushed with 10 ml saline. Hep-locked? no 
Remains accessed? yes Dressing applied? yes Patient tolerated without pain or complications.  
 
 
Chika Whittington, RN

## 2020-10-24 NOTE — PROGRESS NOTES
[facility] Hematology & Oncology Inpatient Hematology / Oncology Progress Note Admission Date: 10/22/2020 12:49 PM 
Reason for Admission/Hospital Course: Diffuse large B cell lymphoma ([facility] [address] [facility] [C83.30] Admission for antineoplastic chemotherapy [Z51.11] [facility] [C83.30] Admission for antineoplastic chemotherapy [Z51.11] 24 Hour Events: 
IT chemo yesterday Tolerated obinutumzumab without issue yesterday ICE to start today No complaints, feeling well overall ROS: 
Constitutional: negative for fever, chills, weakness, malaise, fatigue. CV: negative for chest pain, palpitations, edema. Respiratory: negative for dyspnea, cough, wheezing. GI: negative for nausea, abdominal pain, diarrhea. 10 point review of systems is otherwise negative with the exception of the elements mentioned above in the HPI. No Known Allergies OBJECTIVE: 
Patient Vitals for the past 8 hrs: 
 BP Temp Pulse Resp SpO2 Weight 10/24/20 1048 119/80 97.3 °F (36.3 °C) 89 16 99 %   
10/24/20 0657 118/82 97.7 °F (36.5 °C) 72 17 99 %   
10/24/20 0539      188 lb 0.8 oz (85.3 kg) Temp (24hrs), Av.7 °F (36.5 °C), Min:97.3 °F (36.3 °C), Max:98.2 °F (36.8 °C) 
 
10/24 0701 - 10/24 1900 In: 150 [P.O.:150] Out: 600 [Urine:600] Physical Exam: 
Constitutional: Well developed, well nourished female in no acute distress, sitting comfortably in the hospital bedside chair. HEENT: Normocephalic and atraumatic. Oropharynx is clear, mucous membranes are moist.  Extraocular muscles are intact. Sclerae anicteric. Skin Warm and dry. No bruising and no rash noted. No erythema. No pallor. Respiratory Lungs are clear to auscultation bilaterally without wheezes, rales or rhonchi, normal air exchange without accessory muscle use. CVS Normal rate, regular rhythm and normal S1 and S2. No murmurs, gallops, or rubs. Abdomen Soft, nontender and nondistended, normoactive bowel sounds. Neuro Grossly nonfocal with no obvious sensory or motor deficits. MSK Normal range of motion in general.  No edema and no tenderness. Psych Appropriate mood and affect. Labs: 
   
Recent Labs 10/24/20 
7136 10/23/20 
0355 10/22/20 
1445 10/22/20 
1867 WBC 4.1* 2.8* 3.4* 3.8*  
RBC 2.93* 3.01* 3.07* 3.23* HGB 7.7* 8.0* 8.0* 8.4* HCT 25.8* 26.5* 26.3* 27.7* MCV 88.1 88.0 85.7 85.8 MCH 26.3 26.6 26.1 26.0*  
MCHC 29.8* 30.2* 30.4* 30.3*  
RDW 17.2* 17.1* 17.0* 17.0*  
 394 397 446 GRANS 90* 89* 68 68 LYMPH 2* 3* 5* 5*  
MONOS 6 7 20* 16* EOS 0*  --  5 8* BASOS 0  --  1 1 IG 2  --  2 1 DF AUTOMATED MANUAL AUTOMATED AUTOMATED ANEU 3.7 2.5 2.3 2.6 ABL 0.1* 0.1* 0.2* 0.2* ABM 0.2 0.2 0.7 0.6 TYE 0.0  --  0.2 0.3 ABB 0.0  --  0.0 0.0 AIG 0.1  --  0.1 0.1 Recent Labs 10/24/20 
6656 10/23/20 
0510 10/22/20 
1445  140 140  
K 4.1 4.6 3.9  105 104 CO2 26 28 30 AGAP 7 7 6*  
* 168* 112* BUN 9 6 6 CREA 0.59* 0.52* 0.55* GFRAA >60 >60 >60 GFRNA >60 >60 >60  
CA 9.1 9.1 9.3 * 683* 620* TP 7.1 7.6 7.6 ALB 2.6* 2.8* 2.8*  
GLOB 4.5* 4.8* 4.8* AGRAT 0.6* 0.6* 0.6* MG 1.9 1.9 2.1 Imaging: None Medications: 
Current Facility-Administered Medications Medication Dose Route Frequency  etoposide (VEPESID) 198 mg in 0.9% sodium chloride 500 mL chemo infusion  100 mg/m2 (Treatment Plan Recorded) IntraVENous Q24H  
 [START ON 10/26/2020] CARBOplatin (PARAPLATIN) 750 mg in 0.9% sodium chloride 250 mL chemo infusion  750 mg IntraVENous ONCE  
 [START ON 10/26/2020] ifosfamide (IFEX) 9,900 mg, mesna (MESNEX) 9,900 mg in 0.9% sodium chloride 1,000 mL chemo infusion  5,000 mg/m2 (Treatment Plan Recorded) IntraVENous CONTINUOUS  
 [START ON 10/25/2020] fosaprepitant (EMEND) 150 mg in 0.9% sodium chloride 150 mL IVPB  150 mg IntraVENous ONCE  
  dexamethasone (PF) (DECADRON) 10 mg/mL injection 10 mg  10 mg IntraVENous Q24H  
 [START ON 10/25/2020] ondansetron (ZOFRAN) injection 8 mg  8 mg IntraVENous Q8H  
 allopurinoL (ZYLOPRIM) tablet 300 mg  300 mg Oral DAILY  amLODIPine (NORVASC) tablet 10 mg  10 mg Oral DAILY  enoxaparin (LOVENOX) injection 40 mg  40 mg SubCUTAneous T62S  
 folic acid (FOLVITE) tablet 1 mg  1 mg Oral DAILY  meloxicam (MOBIC) tablet 15 mg  15 mg Oral DAILY  0.9% sodium chloride infusion  75 mL/hr IntraVENous CONTINUOUS  
 0.9% sodium chloride infusion  25 mL/hr IntraVENous CONTINUOUS  
 acetaminophen (TYLENOL) tablet 650 mg  650 mg Oral PRN  
 albuterol (PROVENTIL VENTOLIN) nebulizer solution 2.5 mg  2.5 mg Nebulization PRN  
 diphenhydrAMINE (BENADRYL) injection 25 mg  25 mg IntraVENous PRN  
 diphenhydrAMINE (BENADRYL) injection 50 mg  50 mg IntraVENous PRN  
 lidocaine-prilocaine (EMLA) 2.5-2.5 % cream   Topical PRN  
 meperidine (DEMEROL) injection 25 mg  25 mg IntraVENous PRN  
 ondansetron (ZOFRAN ODT) tablet 8 mg  8 mg Oral Q8H PRN  
 ondansetron (ZOFRAN) injection 8 mg  8 mg IntraVENous PRN  promethazine (PHENERGAN) tablet 25 mg  25 mg Oral Q6H PRN  
 sodium chloride 0.9 % bolus infusion 500 mL  500 mL IntraVENous ONCE PRN  
 sodium chloride 0.9 % bolus infusion 500 mL  500 mL IntraVENous ONCE PRN  prochlorperazine (COMPAZINE) with saline injection 10 mg  10 mg IntraVENous Q6H PRN  
 diphenhydrAMINE (BENADRYL) injection 25 mg  25 mg IntraVENous Q6H PRN  
 LORazepam (ATIVAN) injection 0.5 mg  0.5 mg IntraVENous Q6H PRN  
 central line flush (saline) syringe 20 mL  20 mL InterCATHeter Q8H  
 
 
 
ASSESSMENT: 
 
Problem List  Never Reviewed Codes Class Noted Admission for antineoplastic chemotherapy ICD-10-CM: Z51.11 ICD-9-CM: V58.11  10/22/2020 Diffuse large B cell lymphoma (HCC) ICD-10-CM: C83.30 ICD-9-CM: 202.80  10/22/2020 DLBCL (diffuse large B cell lymphoma) (HCC) ICD-10-CM: C83.30 ICD-9-CM: 202.80  10/21/2020 Ms. Bonnie Camara is a 48 y.o. female admitted on 10/22/2020 with a primary diagnosis of The encounter diagnosis was Diffuse large B-cell lymphoma of spleen ([facility] [address] .   
  
48 female, , non-smoker, history of DM, hypertension, lupus/RA (Dr Kiya Soto, on meloxicam, prednisone and plaquenil prn), distant history of b/l LE DVTs (multiple yrs ago), tubal ligation, w/ relapsed/refractory DLBCL under care of Dr Jewel Castellano. She is s/p RCHOP x 8, initially responding disease however scans w/ disease progression after 8 cycles. Seen today in follow-up 10/22/2020: Reports ED visit last Wednesday with fever which self resolved, did not need any antibiotics. Reportedly Covid and flu was negative. Has since been afebrile, doing well. Today denies any new symptoms. Extensive ROS unremarkable today. Recent labs with CBC unremarkable, creatinine normal range, of note alk phos and LDH elevated. Uric acid 3.1. Hepatitis panel negative. HIV negative. Will place on allopurinol 300 twice daily. Recent 2D echo with unremarkable EF. Recent CT CAP slightly worse from the last PET scan with significant disease involving liver and spleen (imaging personally reviewed). Her initial outside pathology was internally read consistent with DLBCL, ABC type with CD30 positivity. More recent liver biopsy consistent with relapsed disease. Will admit for ObinutuzumabICE. We will also plan for prophylactic IT methotrexate. G-CSF support on discharge.   
  
 
PLAN: 
Recurrent DLBCL ABC subtype, presenting as stage IVB disease, CD30+ve (significant involvement of liver and spleen) - As above. Start Obinutuzumab-ICE. Udenyca support. - IT MTX prophylaxis x 5 - Allopurinol 300 twice daily - Look into enrollment in [id number] with ibrutinib in association w ASCT. She will need to be on minimal steroids for this 10/23 IT chemo today, obinutuzumab today. 10/24 Start ICE today, home likely Tuesday. Port malfunction 10/23 Portagram in IR: Abnormally located left internal jugular vein chest port catheter. The tip of the catheter is either in an intramural location or within an occluded internal jugular vein. PICC placed in IR. DVT prophylaxis with Lovenox Immunocompromised: Prophylaxis with diflucan and acyclovir  
  
 
Goals and plan of care reviewed with the patient. All questions answered to the best of our ability. Zeina Lee NP [address] [place] [address] Hematology & Oncology [address] [place] [address] Office : [phone] Fax : [phone] Attending Addendum: 
I have personally performed a face to face diagnostic evaluation on this patient. I have reviewed and agree with the care plan as documented by Jarek Nolen NAna MariaP. 36 minutes were spent on patient care, including but not limited to, reviewing the chart and time with the patient and family, more than 50% of the time documented was spent in face-to-face contact with the patient and in the care of the patient on the floor/unit where the patient is located. My findings are as follows: She has Diffuse Large B-Cell Lymphoma, appears anxious, heart rate regular without murmurs, abdomen is non-tender, bowel sounds are positive, we will continue chemotherapy as per protocol. Joseph Medina MD 
 
 
[address] [place] [address] Hematology/Oncology [address] [place] [address] Office : [phone] Fax : [phone]

## 2020-10-24 NOTE — PROGRESS NOTES
Problem: Falls - Risk of 
Goal: *Absence of Falls Description: Document [facility] Fall Risk and appropriate interventions in the flowsheet. Outcome: Progressing Towards Goal 
Note: Fall Risk Interventions: 
  
 
  
 
Medication Interventions: Evaluate medications/consider consulting pharmacy, Teach patient to arise slowly, Patient to call before getting OOB

## 2020-10-25 NOTE — PROGRESS NOTES
[facility] Hematology & Oncology Inpatient Hematology / Oncology Progress Note Admission Date: 10/22/2020 12:49 PM 
Reason for Admission/Hospital Course: Diffuse large B cell lymphoma ([facility] [C83.30] Admission for antineoplastic chemotherapy [Z51.11] Diffuse large B cell lymphoma ([facility] [C83.30] Admission for antineoplastic chemotherapy [Z51.11] 24 Hour Events: 
IT chemo Oct 23 Tolerated obinutumzumab without issue Oct 23 Doing well on ICE No complaints, feeling well overall No night sweats last night Weight gain noted - will give Lasix ROS: 
Constitutional: negative for fever, chills, weakness, malaise, fatigue. CV: negative for chest pain, palpitations, edema. Respiratory: negative for dyspnea, cough, wheezing. GI: negative for nausea, abdominal pain, diarrhea. 10 point review of systems is otherwise negative with the exception of the elements mentioned above in the HPI. No Known Allergies OBJECTIVE: 
Patient Vitals for the past 8 hrs: 
 BP Temp Pulse Resp SpO2 Weight 10/25/20 1100 114/83 97.5 °F (36.4 °C) 95 18 99 %   
10/25/20 0754 124/87 97.6 °F (36.4 °C) 73 18 100 %   
10/25/20 0648      199 lb 6.4 oz (90.4 kg) Temp (24hrs), Av.8 °F (36.6 °C), Min:97.5 °F (36.4 °C), Max:98.1 °F (36.7 °C) 
 
10/25 0701 - 10/25 1900 In: 240 [P.O.:240] Out: 1000 [Urine:500] Physical Exam: 
Constitutional: Well developed, well nourished female in no acute distress, sitting comfortably in the hospital bedside chair. HEENT: Normocephalic and atraumatic. Oropharynx is clear, mucous membranes are moist.  Extraocular muscles are intact. Sclerae anicteric. Skin Warm and dry. No bruising and no rash noted. No erythema. No pallor. Respiratory Lungs are clear to auscultation bilaterally without wheezes, rales or rhonchi, normal air exchange without accessory muscle use. CVS Normal rate, regular rhythm and normal S1 and S2. No murmurs, gallops, or rubs. Abdomen Soft, nontender and nondistended, normoactive bowel sounds. Neuro Grossly nonfocal with no obvious sensory or motor deficits. MSK Normal range of motion in general.  No edema and no tenderness. Psych Appropriate mood and affect. Labs: 
   
Recent Labs 10/25/20 
3841 10/24/20 
5554 10/23/20 
0355 10/22/20 
1445 WBC 4.2* 4.1* 2.8* 3.4*  
RBC 3.10* 2.93* 3.01* 3.07* HGB 8.2* 7.7* 8.0* 8.0*  
HCT 27.0* 25.8* 26.5* 26.3*  
MCV 87.1 88.1 88.0 85.7 MCH 26.5 26.3 26.6 26.1 MCHC 30.4* 29.8* 30.2* 30.4*  
RDW 17.3* 17.2* 17.1* 17.0*  
 405 394 397 GRANS 87* 90* 89* 68  
LYMPH 2* 2* 3* 5* MONOS 10 6 7 20* EOS 0* 0*  --  5  
BASOS 0 0  --  1 IG 1 2  --  2  
DF AUTOMATED AUTOMATED MANUAL AUTOMATED ANEU 3.6 3.7 2.5 2.3 ABL 0.1* 0.1* 0.1* 0.2* ABM 0.4 0.2 0.2 0.7 TYE 0.0 0.0  --  0.2 ABB 0.0 0.0  --  0.0 AIG 0.1 0.1  --  0.1 Recent Labs 10/25/20 
9674 10/24/20 
8843 10/23/20 
0510  140 140  
K 4.0 4.1 4.6 * 107 105 CO2 26 26 28 AGAP 7 7 7 * 175* 168* BUN 11 9 6 CREA 0.46* 0.59* 0.52* GFRAA >60 >60 >60 GFRNA >60 >60 >60  
CA 9.1 9.1 9.1 * 618* 683* TP 6.8 7.1 7.6 ALB 2.6* 2.6* 2.8*  
GLOB 4.2* 4.5* 4.8* AGRAT 0.6* 0.6* 0.6* MG 1.9 1.9 1.9 Imaging: None Medications: 
Current Facility-Administered Medications Medication Dose Route Frequency  furosemide (LASIX) injection 20 mg  20 mg IntraVENous ONCE  
 etoposide (VEPESID) 198 mg in 0.9% sodium chloride 500 mL chemo infusion  100 mg/m2 (Treatment Plan Recorded) IntraVENous Q24H  
 CARBOplatin (PARAPLATIN) 750 mg in 0.9% sodium chloride 250 mL chemo infusion  750 mg IntraVENous ONCE  
 ifosfamide (IFEX) 9,900 mg, mesna (MESNEX) 9,900 mg in 0.9% sodium chloride 1,000 mL chemo infusion  5,000 mg/m2 (Treatment Plan Recorded) IntraVENous CONTINUOUS  
 fosaprepitant (EMEND) 150 mg in 0.9% sodium chloride 150 mL IVPB  150 mg IntraVENous ONCE  
  dexamethasone (PF) (DECADRON) 10 mg/mL injection 10 mg  10 mg IntraVENous Q24H  
 ondansetron (ZOFRAN) injection 8 mg  8 mg IntraVENous Q8H  
 acyclovir (ZOVIRAX) capsule 400 mg  400 mg Oral BID  fluconazole (DIFLUCAN) tablet 400 mg  400 mg Oral DAILY  alum-mag hydroxide-simeth (MYLANTA) oral suspension 30 mL  30 mL Oral Q4H PRN  
 allopurinoL (ZYLOPRIM) tablet 300 mg  300 mg Oral DAILY  amLODIPine (NORVASC) tablet 10 mg  10 mg Oral DAILY  enoxaparin (LOVENOX) injection 40 mg  40 mg SubCUTAneous W12V  
 folic acid (FOLVITE) tablet 1 mg  1 mg Oral DAILY  meloxicam (MOBIC) tablet 15 mg  15 mg Oral DAILY  0.9% sodium chloride infusion  75 mL/hr IntraVENous CONTINUOUS  
 0.9% sodium chloride infusion  25 mL/hr IntraVENous CONTINUOUS  
 acetaminophen (TYLENOL) tablet 650 mg  650 mg Oral PRN  
 albuterol (PROVENTIL VENTOLIN) nebulizer solution 2.5 mg  2.5 mg Nebulization PRN  
 diphenhydrAMINE (BENADRYL) injection 25 mg  25 mg IntraVENous PRN  
 diphenhydrAMINE (BENADRYL) injection 50 mg  50 mg IntraVENous PRN  
 lidocaine-prilocaine (EMLA) 2.5-2.5 % cream   Topical PRN  
 meperidine (DEMEROL) injection 25 mg  25 mg IntraVENous PRN  
 ondansetron (ZOFRAN ODT) tablet 8 mg  8 mg Oral Q8H PRN  
 ondansetron (ZOFRAN) injection 8 mg  8 mg IntraVENous PRN  promethazine (PHENERGAN) tablet 25 mg  25 mg Oral Q6H PRN  prochlorperazine (COMPAZINE) with saline injection 10 mg  10 mg IntraVENous Q6H PRN  
 diphenhydrAMINE (BENADRYL) injection 25 mg  25 mg IntraVENous Q6H PRN  
 LORazepam (ATIVAN) injection 0.5 mg  0.5 mg IntraVENous Q6H PRN  
 central line flush (saline) syringe 20 mL  20 mL InterCATHeter Q8H  
 
 
 
ASSESSMENT: 
 
Problem List  Never Reviewed Codes Class Noted Admission for antineoplastic chemotherapy ICD-10-CM: Z51.11 ICD-9-CM: V58.11  10/22/2020 Diffuse large B cell lymphoma (HCC) ICD-10-CM: C83.30 ICD-9-CM: 202.80  10/22/2020 DLBCL (diffuse large B cell lymphoma) (HCC) ICD-10-CM: C83.30 ICD-9-CM: 202.80  10/21/2020 Ms. Aruna Jarquin is a 48 y.o. female admitted on 10/22/2020 with a primary diagnosis of The encounter diagnosis was Diffuse large B-cell lymphoma of spleen ([facility] [address] .   
  
48 female, , non-smoker, history of DM, hypertension, lupus/RA (Dr Nathan Khoury, on meloxicam, prednisone and plaquenil prn), distant history of b/l LE DVTs (multiple yrs ago), tubal ligation, w/ relapsed/refractory DLBCL under care of Dr Mc Bush. She is s/p RCHOP x 8, initially responding disease however scans w/ disease progression after 8 cycles. Seen today in follow-up 10/22/2020: Reports ED visit last Wednesday with fever which self resolved, did not need any antibiotics. Reportedly Covid and flu was negative. Has since been afebrile, doing well. Today denies any new symptoms. Extensive ROS unremarkable today. Recent labs with CBC unremarkable, creatinine normal range, of note alk phos and LDH elevated. Uric acid 3.1. Hepatitis panel negative. HIV negative. Will place on allopurinol 300 twice daily. Recent 2D echo with unremarkable EF. Recent CT CAP slightly worse from the last PET scan with significant disease involving liver and spleen (imaging personally reviewed). Her initial outside pathology was internally read consistent with DLBCL, ABC type with CD30 positivity. More recent liver biopsy consistent with relapsed disease. Will admit for ObinutuzumabICE. We will also plan for prophylactic IT methotrexate. G-CSF support on discharge.   
  
 
PLAN: 
Recurrent DLBCL ABC subtype, presenting as stage IVB disease, CD30+ve (significant involvement of liver and spleen) - As above. Start Obinutuzumab-ICE. Udenyca support. - IT MTX prophylaxis x 5 - Allopurinol 300 twice daily - Look into enrollment in [id number] with ibrutinib in association w ASCT. She will need to be on minimal steroids for this 10/23 IT chemo today, obinutuzumab today. 10/24 Start ICE today, home likely Tuesday. 10/25 Tolerating well. No new complaints. Give 20 mg Lasix IV for 11# weight gain. Port malfunction 10/23 Portagram in IR: Abnormally located left internal jugular vein chest port catheter. The tip of the catheter is either in an intramural location or within an occluded internal jugular vein. PICC placed in IR. DVT prophylaxis with Lovenox Immunocompromised: Prophylaxis with diflucan and acyclovir  
  
 
Goals and plan of care reviewed with the patient. All questions answered to the best of our ability. Keyshawn Fields NP [facility] Hematology & Oncology [address] [place] [address] Office : [phone] Fax : [phone] Attending Addendum: 
I have personally performed a face to face diagnostic evaluation on this patient. I have reviewed and agree with the care plan as documented by Vamshi Alejandra N.P. My findings are as follows: She has Diffuse Large B-Cell Lymphoma, appears fatigued, heart rate regular without murmurs, abdomen is non-tender, bowel sounds are positive, we will continue salvage chemotherapy as per protocol. Kendra Perez MD 
 
 
[facility] Hematology/Oncology [address] [place] [address] Office : [phone] Fax : [phone]

## 2020-10-26 NOTE — PROGRESS NOTES
Care Management Interventions Transition of Care Consult (CM Consult): Discharge Planning Current Support Network: Own Home, Lives with Spouse Confirm Follow Up Transport: Family Discharge Location Discharge Placement: Home Per MD pt stable for d/c.  Per oncology notes the navigators are arranging for pt's f/u appts at [facility].

## 2020-10-26 NOTE — DISCHARGE SUMMARY
[facility] Hematology and Oncology: Inpatient Hematology / Oncology Discharge Summary Note Patient ID: 
Vicente Vogt [id number] 
1 y.o. 
1970 Admit Date: 10/22/2020 Discharge Date: 10/26/2020 Admission Diagnoses: Diffuse large B cell [facility] ([facility] [C83.30] Admission for antineoplastic chemotherapy [Z51.11] Diffuse large B cell lymphoma ([facility] [C83.30] Admission for antineoplastic chemotherapy [Z51.11] Discharge Diagnoses: 
Principal Diagnosis: <principal problem not specified> Active Problems: DLBCL (diffuse large B cell lymphoma) ([facility] (10/21/2020) Admission for antineoplastic chemotherapy (10/22/2020) Diff[facility] large [facility] cell [facility] ([facility] (10/22/2020) Hospital Course: Ms. Aviles is a 48 y.o. female admitted on 10/22/2020 with a primary diagnosis of The encounter diagnosis was Diffuse large B-cell lymphoma of spleen ([facility] Ms Rios Chappell has PMH of DM, HTN, lupus/RA (on meloxicam, prednisone and plaquenil PRN), BL LE DVT (remote history), tubal ligation. She is a patient of Dr Salina Magdaleno and is being treated for relapsed/refractory DLBCL. She was previously treated in [place] and according to history she is s/p RCHOP x 8, initially responding disease however scans w/ disease progression after 8 cycles. She saw Dr Salina Magdaleno 10/22 in follow-up and for admission for chemotherapy. She was started on allopurinol, recent 2D echo with unremarkable EF.  Recent CT CAP slightly worse from the last PET scan with significant disease involving liver and spleen (imaging personally reviewed).  Her initial outside pathology was internally read consistent with DLBCL, ABC type with CD30 positivity.  More recent liver biopsy consistent with relapsed disease.  She was admitted for [address] will also plan for prophylactic IT methotrexate.  G-CSF support on discharge.  Look into enrollment in [id number] with ibrutinib in association w ASCT. She will need to be on minimal steroids for this. She tolerated obinutuzumab-ICE without issue. She completed IT chemo 10/23. She tolerated chemotherapy without issue. Of note, her port was noted to be malfunctioning. Portagram in IR showed abnormally located left internal jugular vein chest port catheter. The tip of the catheter is either in an intramural location or within an occluded internal jugular vein. PICC placed in IR and remains in place. She continued on allopurinol and prophylactic diflucan and acyclovir. She did require lasix x1 for weight gain but otherwise, no further issues. She is now ready for discharge. She will continue allopurinol as well as diflucan and acyclovir. She will need G-CSF on 10/28 and navigation will arrange for her follow-up in clinic and for next obinutuzumab. Her PICC will remain in place for now. Order placed for outpatient removal of malfunctioning port and to be removed and replaced per IR. She knows to call with any questions or concerns including fevers or symptoms not controlled with medications. 
  
 
 
Consults: 
IP CONSULT TO INTERVENTIONAL RADIOLOGY 
IP CONSULT TO INTERVENTIONAL RADIOLOGY Pertinent Diagnostic Studies:  
Labs:   
Recent Labs 10/26/20 
2386 10/25/20 
0659 10/24/20 
7196 WBC 2.8* 4.2* 4.1* HGB 8.2* 8.2* 7.7*  370 405 ANEU 2.5 3.6 3.7 Recent Labs 10/26/20 
0653 10/25/20 
0129 10/24/20 
3160  142 140  
K 3.7 4.0 4.1  109* 107 CO2 27 26 26 * 137* 175* BUN 13 11 9 CREA 0.54* 0.46* 0.59* CA 8.7 9.1 9.1 * 561* 618* TP 6.5 6.8 7.1 ALB 2.6* 2.6* 2.6*  
MG 1.8 1.9 1.9 Imaging: 
 
IR SPINAL PUNCTURE CSF TREAT / Marti Larger [[id number]]  Collected: 10/23/20 1233 Order Status: Completed  Updated: 10/23/20 1440 Narrative:     
Title: 1. Diagnostic Lumbar puncture. 2. Intrathecal chemotherapy administration. History: 66-year-old female with lymphoma. : Coleen Andersen PA-C Supervising Physician: Sarah Grimaldo M.D. Consent: Informed written and oral consent was obtained from the patient after  
explanation of benefits and risks (including, but not limited to: infection,  
nerve injury, hemorrhage). The patient's questions were answered to  
satisfaction. The patient stated understanding and requested that we proceed. Procedure: Maximal sterile barrier technique was used.  With the patient prone,  
the skin of the back was prepped and draped in the standard sterile fashion. 1%  
lidocaine was used for local field block. Using fluoroscopy, a 22 gauge spinal  
needle was advanced into the intrathecal space at the L4-5 level. Appropriate  
position was confirmed with clear spinal fluid return. Opening pressure was 10 cm H20.    
 
A total of 5 cc was removed for evaluation.    
 
The methotrexate was then injected as ordered by the oncologist.  
 
The needle was removed and a dressing was applied. Complications:  None. Radiation dose:  
Fluoroscopy time: 18 seconds. Reference air kerma (mGy): 5 Kerma area product (cGy.cm2): [id number] Fluoroscopic images: 2 Contrast:  0 milliliters. Impression:     
Impression: Uncomplicated lumbar puncture.    
 
Plan: The patient will recover at bedrest.  
  
IR INJ CVAD EVALUATION Ketty Moy [[id number]]  Collected: 10/23/20 1228 Order Status: Completed  Updated: 10/23/20 1245 Narrative:     
Title: Chest Port check.  Radiological Catheter Evaluation. Indication: 59-year-old woman with diffuse large B-cell lymphoma and a difficult  
to aspirate left internal jugular vein chest port.  This Chest Port was placed  
at an outside institution on or about 3/25/2020 (XR fluoro noted at [facility]).   
 
Procedure:  The port was accessed in the standard sterile technique with a Hurst  
needle prior to the procedure.  The patient was placed supine on the fluoroscopy  
table.    
 
Contrast was administered into the chest port using fluoroscopic guidance.  A  
radiological catheter evaluation/superior vena cavagram was performed.  Images  
were reviewed. Contrast:  Less than 10 milliliters. Radiation Exposure Indices:  
Reference Air Kerma (Francisco Solo) = see technologist notes mGy Fluorographic Images = 7 Findings: Left chest port catheter is redundant within the soft tissues of the  
left neck.  The catheter tip terminates cephalad to the clavicle head.  Contrast  
flows around the catheter and out to, small diameter, unnamed collateral veins  
eventually draining into the left subclavian vein.  The central portion of the  
left internal jugular vein is never opacified. Impression:     
Impression: Abnormally located left internal jugular vein chest port catheter. The tip of the catheter is either in an intramural location or within an  
occluded internal jugular vein. Plan:  Recommend chest port removal and replacement.  I recommend not using this  
chest port. Current Discharge Medication List  
  
START taking these medications Details  
acyclovir (ZOVIRAX) 200 mg capsule Take 2 Caps by mouth two (2) times a day for 30 days. Qty: 120 Cap, Refills: 0  
  
fluconazole (DIFLUCAN) 200 mg tablet Take 2 Tabs by mouth daily for 30 days. FDA advises cautious prescribing of oral fluconazole in pregnancy. Qty: 60 Tab, Refills: 0 CONTINUE these medications which have NOT CHANGED Details  
meloxicam (MOBIC) 15 mg tablet meloxicam 15 mg tablet 1 tablet daily  
  
triamcinolone acetonide (KENALOG) 0.1 % topical cream triamcinolone acetonide 0.1 % topical cream 
 apply to affected area twice daily 2 week on/ 2 weeks off as needed. allopurinoL (ZYLOPRIM) 300 mg tablet allopurinol 300 mg tablet 1 tablet by mouth 2 times daily  
  
amLODIPine (Norvasc) 10 mg tablet Norvasc 10 mg tablet Norvasc 10MG, 1 (one) Tablet Tablet daily # 30, 2014, Ref. x3. Active  
  
aspirin delayed-release 81 mg tablet Take 81 mg by mouth daily. calcipotriene (DOVONEX) 0.005 % topical cream calcipotriene 0.005 % topical cream 
 1 application topicall 2 times a day to legs  
  
clobetasoL (TEMOVATE) 0.05 % ointment clobetasol 0.05 % topical ointment 
 apply twice daily for 2 weeks on and 2 weeks off  
  
folic acid (FOLVITE) 1 mg tablet folic acid 1 mg tablet 1 tablet daily  
  
hydrOXYchloroQUINE (PLAQUENIL) 200 mg tablet hydroxychloroquine 200 mg tablet  
  
ketoconazole (NIZORAL) 2 % topical cream ketoconazole 2 % topical cream 
 1 application topically 2 times a day as needed for rash  
  
lidocaine-prilocaine (EMLA) topical cream Apply  to affected area as needed. ondansetron hcl (ZOFRAN) 4 mg tablet ondansetron HCl 4 mg tablet 1 tablet by mouth 3 times a day as a needed for nausea or vomiting  
  
promethazine (PHENERGAN) 12.5 mg tablet promethazine 12.5 mg tablet 1 tablet by mouth every 8 hours as needed for nausea and vomiting. predniSONE (DELTASONE) 50 mg tablet prednisone 50 mg tablet 
 take 2 tablets by mouth daily with breakfast  
  
  
 
 
 
 
OBJECTIVE: 
Patient Vitals for the past 8 hrs: 
 BP Temp Pulse Resp SpO2  
10/26/20 0821 120/75 97.6 °F (36.4 °C) 79 18 100 % 10/26/20 0312 119/65 97.6 °F (36.4 °C) 66 19 99 % Temp (24hrs), Av.6 °F (36.4 °C), Min:97.5 °F (36.4 °C), Max:97.9 °F (36.6 °C) 
 
10/26 0701 - 10/26 1900 In: 240 [P.O.:240] Out: - Physical Exam: 
Constitutional: Well developed, well nourished female in no acute distress, sitting comfortably in bed. HEENT: Normocephalic and atraumatic. Oropharynx is clear, mucous membranes are moist.  Pupils are equal, round, and reactive to light. Extraocular muscles are intact. Sclerae anicteric. Neck supple without JVD. No thyromegaly present. Lymph node No palpable submandibular, cervical, supraclavicular, axillary or inguinal lymph nodes. Skin Warm and dry. No bruising and no rash noted. No erythema. No pallor. Respiratory Lungs are clear to auscultation bilaterally without wheezes, rales or rhonchi, normal air exchange without accessory muscle use. CVS Normal rate, regular rhythm and normal S1 and S2. No murmurs, gallops, or rubs. Abdomen Soft, nontender and nondistended, normoactive bowel sounds. No palpable mass. No hepatosplenomegaly. Neuro Grossly nonfocal with no obvious sensory or motor deficits. MSK Normal range of motion in general.  No edema and no tenderness. Psych Appropriate mood and affect. ASSESSMENT: 
 
Active Problems: DLBCL (diffuse large B cell lymphoma) ([facility] (10/21/2020) Admission for antineoplastic chemotherapy (10/22/2020) Diffuse large B cell lymphoma ([facility] (10/22/2020) DISPOSITION: 
 
Follow-up Appointments Procedures  FOLLOW UP VISIT Appointment in: Other (Specify) Navigation working on follow-up appts for Wednesday for Arrie Frankel and Thursday for next infusion and follow-up in clinic. Navigation working on follow-up appts for Wednesday for Arrie Frankel and Thursday for next infusion and follow-up in clinic. Standing Status:   Standing Number of Occurrences:   1 Order Specific Question:   Appointment in Answer: Other (Specify) Over 30 minutes was spent in discharge planning and coordination of care. [facility] Hematology & Oncology [address] [place] [address] Office : [phone] Fax : [phone] Attending Addendum: 
I personally evaluated the patient and discussed with Briseida Leger N.P.,  and agree with the assessment, findings and plan as documented. She has tolerated her therapy extremely well. She has no nausea or vomiting. She denies mouth sores.   She has had no ifosfamide related complaints, specifically no episodes of confusion or somnolence. Her electrolytes including potassium and magnesium have been normal.  She has a mild headache perhaps due to ondansetron. She tolerated her methotrexate intrathecally without much difficulty. She is now ready for discharge home after completion of her etoposide today. She will be seen in follow-up for colony-stimulating factors and ultimately to have her malfunctioning port reinserted. Radha Jordan MD FAC Oncology and Hematology  [address] [phone] F [phone] 
[email or web address]

## 2020-10-26 NOTE — PROGRESS NOTES
Problem: Falls - Risk of 
Goal: *Absence of Falls Description: Document Richard Merlin Fall Risk and appropriate interventions in the flowsheet. Outcome: Progressing Towards Goal 
Note: Fall Risk Interventions: 
  
 
  
 
Medication Interventions: Teach patient to arise slowly Problem: Patient Education: Go to Patient Education Activity Goal: Patient/Family Education Outcome: Progressing Towards Goal

## 2020-10-26 NOTE — PROGRESS NOTES
Pt tolerated chemotherapy without difficulty. Right PICC line flushed per protocol and cap ends changed. Dressing changed per protocol. Written discharge instructions given and discussed with pt, who verbalizes/demonstrates understanding. Waiting for ride home. Denies pain.

## 2020-10-27 NOTE — PROGRESS NOTES
Arrived to the [facility]. Tony completed. Provided education on injection. She states she received similar injection at another facility. Patient instructed to report any side affects to ordering provider. Patient tolerated well. Any issues or concerns during appointment: none  Patient aware of next infusion appointment on 10/29/20  Discharged ambulatory with self.

## 2020-10-29 PROBLEM — C83.30 DIFFUSE LARGE B-CELL LYMPHOMA (HCC): Status: ACTIVE | Noted: 2020-01-01

## 2020-10-29 NOTE — PROGRESS NOTES
10/29/20 - Patient here for follow up. Patient with no major issues other than some constipation noted. Patient will try miralax. Patient will have labs and replacements in prep for port placement on Monday. If pt needs plts, they will be ordered for Monday to be given in IR.   If plts needed Monday, patient will need to arrive at IR at 8:30 as opposed to 9am (no platelets)

## 2020-11-01 NOTE — PROGRESS NOTES
Arrived to the [facility]. Assessment completed, labs drawn peripherally and   reviewed. 1 unit of platelets ordered to be given in Interventional radiology tomorrow prior to procedure. Patient instructed not to remove green armband prior to tomorrow. Verbalizes understanding Patient tolerated without problems. Any issues or concerns during appointment: None Instructed to call Dr Daisy Frye  with any  concerns Patient aware of next infusion appointment on 11/5/20 (date) at 11 29 AM (time). Discharged ambulatory with family

## 2020-11-01 NOTE — PROGRESS NOTES
Melissa Magdaleno NP on call made aware unable to flush or get blood from PICC line after cathflo. Labs obtained from peripheral stick . No orders given

## 2020-11-02 NOTE — PROGRESS NOTES
Blood return obtained from patients new PICC line. Line flushed with no complications. Patient received AVS. Opportunity for questions provided. Patient ambulatory off the unit at this time.

## 2020-11-02 NOTE — PROCEDURES
Department of Interventional Radiology  [phone]        Interventional Radiology Brief Procedure Note    Patient: Monica Burgos MRN: [id number]  SSN: [id number]    YOB: 1970  Age: 48 y.o.   Sex: female      Date of Procedure: 11/2/2020    Pre-Procedure Diagnosis: lymphoma, pancytopenia, non functioning PICC    Post-Procedure Diagnosis: SAME    Procedure(s): PICC replacement    Brief Description of Procedure: as above    Performed By: Hannah Gilmore MD     Assistants: None    Anesthesia:Lidocaine    Estimated Blood Loss: Less than 10ml    Specimens:  None    Implants:  Right arm DL PICC    Findings: picc curled in right arm with spasm, narrowed SVC, new picc placed    Complications: None    Recommendations: OK to use PICC, reschedule once WBC and platelets improved     Follow Up: as above    Signed By: Hannah Gilmore MD     November 2, 2020

## 2020-11-02 NOTE — DISCHARGE INSTRUCTIONS
[address]  Department of Interventional Radiology  [facility]  [phone] Office  [phone] Fax  Implanted Port Discharge Instructions      General Instructions:   A port is like an implanted IV. They are usually ordered for patients who will be getting chemotherapy, but can also be used as an IV for long term antibiotics, large amounts of fluids, and/or blood products. Your blood can be drawn from your port for labs also. Those patients who do not have good veins find the ports convenient as they can get the IV they need with one stick. The port can be used long term, and the care is easy. The device is under the skin, and once the skin heals, care is minimal. All that is required is the nurse who accesses the port will need to flush it with heparinized saline after each use. Ports are usually placed in the chest wall, usually on the right side. But they can be place in the arms and in the abdomen. Home Care Instructions: If your port is in your arm, do not allow blood pressure or other IVs to be place in that arm. Do not allow bra straps or any clothing to rub the skin over the port. Do not bathe or swim until the skin has healed and if the port is accessed. Once it is healed, and when the port is not accessed, it is okay to bathe and swim. Restrict yourself to light activity for the first 5 days after getting the port put in, after that, resume normal activity slowly. You may resume your normal diet and medications. Follow-Up Instructions: Please see your oncologist, or whatever physician ordered the port as he/she has requested of you. Call If: You should call your Physician and/or the Radiology Nurse if you notice redness, pus, swelling, or pain from the area of your incision. Call if you should develop a fever. The nurses who access your port will know to call your doctor if the port does not seem to be working properly. You need to tell the nurses who use the port if you should have any pain or swelling at the site during an infusion. To Reach Us: If you have any questions about your procedure, please call the Interventional Radiology department at [phone]. After business hours (5pm) and weekends, call the answering service at [phone] and ask for the Radiologist on call to be paged. Si tiene Preguntas acerca del procedimiento, por favor llame al departamento de Radiología Intervencional al [phone]. Después de horas de oficina (5 pm) y los fines de [place], llamar al [place] [facility] al [phone] y pregunte por el Radiologo de Arsh Lozano. Interventional Radiology General Nurse Discharge    After general anesthesia or intravenous sedation, for 24 hours or while taking prescription Narcotics:  · Limit your activities  · Do not drive and operate hazardous machinery  · Do not make important personal or business decisions  · Do  not drink alcoholic beverages  · If you have not urinated within 8 hours after discharge, please contact your surgeon on call. * Please give a list of your current medications to your Primary Care Provider. * Please update this list whenever your medications are discontinued, doses are     changed, or new medications (including over-the-counter products) are added. * Please carry medication information at all times in case of emergency situations. These are general instructions for a healthy lifestyle:    No smoking/ No tobacco products/ Avoid exposure to second hand smoke  Surgeon General's Warning:  Quitting smoking now greatly reduces serious risk to your health.     Obesity, smoking, and sedentary lifestyle greatly increases your risk for illness  A healthy diet, regular physical exercise & weight monitoring are important for maintaining a healthy lifestyle    You may be retaining fluid if you have a history of heart failure or if you experience any of the following symptoms:  Weight gain of 3 pounds or more overnight or 5 pounds in a week, increased swelling in our hands or feet or shortness of breath while lying flat in bed. Please call your doctor as soon as you notice any of these symptoms; do not wait until your next office visit. Recognize signs and symptoms of STROKE:  F-face looks uneven    A-arms unable to move or move unevenly    S-speech slurred or non-existent    T-time-call 911 as soon as signs and symptoms begin-DO NOT go       Back to bed or wait to see if you get better-TIME IS BRAIN.       Patient Signature:  Date: 11/2/2020  Discharging Nurse: Brianna Gilmore RN

## 2020-11-02 NOTE — PROGRESS NOTES
Pt to IR Procedure Room #1/[facility] room for Line Placement Check/possible exchange per Dr. Clementine Camara.

## 2020-11-05 NOTE — PROGRESS NOTES
11/5/20 Pt came in for a follow up visit today. Pt still has her PICC, she did not get the port placed yet. Pt states she has been feeling nauseous and was prescribed zofran yesterday. We will proceed with treatment today per Jeff Singh, NP. Pt returns for labs and replacements Monday.

## 2020-11-09 NOTE — PROGRESS NOTES
Arrived to the [facility]. Labs drawn peripherally. PICC dressing changed. PICC not giving blood return. Cathflo instilled. Slight blood return after cathflo. Packed with heparin before pt departure. Patient tolerated well. Any issues or concerns during appointment: none. Patient aware of next infusion appointment on 11/17 at 3:30. Discharged ambulatory with family.

## 2020-11-11 NOTE — DISCHARGE INSTRUCTIONS
[address]  Department of Interventional Radiology  [facility]  [phone] Office  [phone] Fax  POST LUMBAR PUNCTURE/MYELOGRAM/INTRATHECAL CHEMOTHERAPY DISCHARGE INSTRUCTIONS  General Information:  Lumbar Puncture: A LP is done to help diagnose several disorders, like pseudo tumor, migraines, meningitis, and multiple sclerosis. It involves a puncture (usually in the lower spine) into the sac that protects the spinal column. A sample of the fluid in that space is removed and tested in the lab. Myelogram:   A Myelogram involves a lumbar puncture, and instead of removing fluid, contrast will be injected into the sac surrounding the spinal column. It is done to visualize the spinal column, nerve roots, spinal canal, vertebral discs and disc space. It is usually done to diagnose back pain with unknown cause or in preparation for surgery. After the injection, a CT scan will be done, usually within two hours of the injection. Intrathecal Chemotherapy:   Chemotherapy can be given in many forms. Intrathecal chemo involves a lumbar puncture, and instead of removing fluid, the chemo will be injected into the space. After any of these procedures, you will be asked to lie flat on your back for 4-6 hours to prevent complications. You should also rest for 24 hours after you go home, and force fluids. If you have a headache, you should take Tylenol or acetaminophen. Call If:   You should call your Physician and/or the Radiology Nurse if you develop a headache that is not relieved by Tylenol, and worsens when you stand and eases when you lie down, you need to call. You may have developed what is referred to as a spinal headache. Our physicians will probably advise you to be on strict bed rest for 24 hours, to drink lots of fluids and caffeine. If this does not help the head pain, call again the next day.  You should call if you have bleeding other than a small spot on your bandage. You should call if you have any numbness, tingling, weakness, fever, chills, urinary retention, severe itching, rash, welts, swelling, or confusion. Follow-up Instructions: See the doctor who ordered your procedure as he/she has instructed. If you had a Lumbar Puncture or Myelogram, your results should be available to your ordering doctor in 3-5 business days. You can remove your dressing in 24 hours and shower regularly. Do not bathe or swim for 72 hours. To Reach Us: If you have any questions about your procedure, please call the Interventional Radiology department at [phone]. After business hours (5pm) and weekends, call the answering service at [phone] and ask for the Radiologist on call to be paged. Si tiene Preguntas acerca del procedimiento, por favor llame al departamento de Radiología Intervencional al [phone]. Después de horas de oficina (5 pm) y los fines de [place], llamar al Marcella Farzad Ibarra al [phone] y pregunte por el Radiologo de [facility]. Interventional Radiology General Nurse Discharge    After general anesthesia or intravenous sedation, for 24 hours or while taking prescription Narcotics:  · Limit your activities  · Do not drive and operate hazardous machinery  · Do not make important personal or business decisions  · Do  not drink alcoholic beverages  · If you have not urinated within 8 hours after discharge, please contact your surgeon on call. * Please give a list of your current medications to your Primary Care Provider. * Please update this list whenever your medications are discontinued, doses are     changed, or new medications (including over-the-counter products) are added. * Please carry medication information at all times in case of emergency situations.     These are general instructions for a healthy lifestyle:    No smoking/ No tobacco products/ Avoid exposure to second hand smoke  Surgeon Barry Li Warning:  Quitting smoking now greatly reduces serious risk to your health. Obesity, smoking, and sedentary lifestyle greatly increases your risk for illness  A healthy diet, regular physical exercise & weight monitoring are important for maintaining a healthy lifestyle    You may be retaining fluid if you have a history of heart failure or if you experience any of the following symptoms:  Weight gain of 3 pounds or more overnight or 5 pounds in a week, increased swelling in our hands or feet or shortness of breath while lying flat in bed. Please call your doctor as soon as you notice any of these symptoms; do not wait until your next office visit. Recognize signs and symptoms of STROKE:  F-face looks uneven    A-arms unable to move or move unevenly    S-speech slurred or non-existent    T-time-call 911 as soon as signs and symptoms begin-DO NOT go       Back to bed or wait to see if you get better-TIME IS BRAIN.     Patient Signature:  Date: 11/11/2020  Discharging Nurse: Garett Nava RN

## 2020-11-11 NOTE — PROGRESS NOTES
Recovery period without difficulty. Pt alert and oriented and denies pain. Dressing is clean, dry, and intact. Reviewed discharge instructions with patient and spouse, both verbalized understanding. Pt escorted to lobby discharge area via wheelchair.

## 2020-11-12 NOTE — PROGRESS NOTES
Problem: Falls - Risk of 
Goal: *Absence of Falls Description: Document Rafael Doty Fall Risk and appropriate interventions in the flowsheet. 11/12/2020 1427 by Violet Meter Outcome: Progressing Towards Goal 
Note: Fall Risk Interventions: 
  
 
  
 
Medication Interventions: Assess postural VS orthostatic hypotension, Patient to call before getting OOB, Teach patient to arise slowly 11/12/2020 1403 by Violet Meter Outcome: Progressing Towards Goal 
Note: Fall Risk Interventions: 
  
 
  
 
Medication Interventions: Assess postural VS orthostatic hypotension, Patient to call before getting OOB, Teach patient to arise slowly Problem: Patient Education: Go to Patient Education Activity Goal: Patient/Family Education 11/12/2020 1427 by Violet Meter Outcome: Progressing Towards Goal 
11/12/2020 1403 by Violet Meter Outcome: Progressing Towards Goal

## 2020-11-12 NOTE — PROGRESS NOTES
11/12/20 Pt came in for a follow up visit with Dr. Salina Magdaleno. Pt labs look good. She will proceed with her admission today for chemotherapy. Dr. Salina Magdaleno wants pt to get 5 IT chemos all together and complete 4 cycles of her therapy then possibly do a stem cell transplant. Dr. Salina Magdaleno would like for her to have twice weekly labs after D/C with transfusion prn, we can do the lab work at the [address] [place] [address] if pt would like to do that. Pt will see an NP 1 week after d/c for a tox check. We will get a PET scan before next cycle and also see Dr. Salina Magdaleno before next cycle as well. Diflucan was d/c this visit.

## 2020-11-12 NOTE — PROGRESS NOTES
Problem: Falls - Risk of 
Goal: *Absence of Falls Description: Document Prince Ruano Fall Risk and appropriate interventions in the flowsheet. Outcome: Progressing Towards Goal 
Note: Fall Risk Interventions: 
  
 
  
 
Medication Interventions: Assess postural VS orthostatic hypotension, Patient to call before getting OOB, Teach patient to arise slowly Problem: Patient Education: Go to Patient Education Activity Goal: Patient/Family Education Outcome: Progressing Towards Goal

## 2020-11-12 NOTE — H&P
History and Physical 
 
     
 
Inpatient Hematology / Oncology History and Physical 
 
Reason for Asmission:  Admission for antineoplastic chemotherapy [Z51.11] History of Present Illness: Ms. Arcadio Montague is a 48 y.o. female admitted on 11/12/2020 with a primary diagnosis of The encounter diagnosis was Diffuse large B-cell lymphoma of spleen ([facility] [address] .   
 
48 female, , non-smoker, history of DM, hypertension, lupus/RA (Dr Joaquin Ennis, on meloxicam, prednisone and plaquenil prn), distant history of b/l LE DVTs (multiple yrs ago), tubal ligation, w/ relapsed/refractory DLBCL under care of Dr Adolfo Ludwig. She is s/p RCHOP x 8, initially responding disease however scans w/ disease progression after 8 cycles. Hepatitis panel negative. HIV negative. On allopurinol 300 twice daily. Recent 2D ECHO with unremarkable EF. CT CAP slightly worse from the last PET scan with significant disease involving liver and spleen (imaging personally reviewed). Her initial outside pathology was internally read consistent with DLBCL, ABC type with CD30 positivity. More recent liver biopsy consistent with relapsed disease. Seen today in office 11/12/20: Since last visit has completed ObinutuzumabICE C1. Tolerated reasonably. Reasonable p.o. intake. Some constipation. Has also undergone IT therapy with CSF analysis which was negative. Denies any recent fevers or chills. Reports abdominal discomfort/bloating sensation is somewhat improved, possibly related to an underlying response. Review of Systems: As mentioned above. All other systems reviewed in full and are unremarkable. No Known Allergies Past Medical History:  
Diagnosis Date  Cancer ([facility] [address]   
 non hodgkins lymphoma  Contact dermatitis and eczema due to cause  [facility]  Rheumatic arteritis Past Surgical History:  
Procedure Laterality Date  HX COLONOSCOPY    
 IR INTRATHECAL CHEMO INJECTION CNS  11/11/2020  IR SPINAL PUNCTURE CSF TREAT / DRAIN  10/23/2020 Family History Problem Relation Age of Onset  No Known Problems Mother  Diabetes Father  Heart Disease Father  No Known Problems Brother  No Known Problems Brother Social History Socioeconomic History  Marital status:  Spouse name: Not on file  Number of children: Not on file  Years of education: Not on file  Highest education level: Not on file Occupational History  Not on file Social Needs  Financial resource strain: Not on file  Food insecurity Worry: Not on file Inability: Not on file  Transportation needs Medical: Not on file Non-medical: Not on file Tobacco Use  Smoking status: Never Smoker  Smokeless tobacco: Never Used Substance and Sexual Activity  Alcohol use: Not Currently Frequency: Never  Drug use: Never  Sexual activity: Yes  
  Partners: Male Birth control/protection: None Lifestyle  Physical activity Days per week: Not on file Minutes per session: Not on file  Stress: Not on file Relationships  Social connections Talks on phone: Not on file Gets together: Not on file Attends Christianity service: Not on file Active member of club or organization: Not on file Attends meetings of clubs or organizations: Not on file Relationship status: Not on file  Intimate partner violence Fear of current or ex partner: Not on file Emotionally abused: Not on file Physically abused: Not on file Forced sexual activity: Not on file Other Topics Concern [address] Service Not Asked  Blood Transfusions Not Asked  Caffeine Concern Not Asked  Occupational Exposure Not Asked [place] [employer] Hazards Not Asked  Sleep Concern Not Asked  Stress Concern Not Asked  Weight Concern Not Asked  Special Diet Not Asked  Back Care Not Asked  Exercise Not Asked  Bike Helmet Not Asked  Seat Belt Not Asked  Self-Exams Not Asked Social History Narrative  Not on file Current Facility-Administered Medications Medication Dose Route Frequency Provider Last Rate Last Dose  influenza vaccine 2020-21 (6 mos+)(PF) (FLUARIX/FLULAVAL/FLUZONE QUAD) injection 0.5 mL  0.5 mL IntraMUSCular PRIOR TO DISCHARGE Luz Maria Mills MD      
 0.9% sodium chloride infusion  25 mL/hr IntraVENous CONTINUOUS Luz Maria Mills MD 25 mL/hr at 11/12/20 1439 25 mL/hr at 11/12/20 1439  diphenhydrAMINE (BENADRYL) injection 50 mg  50 mg IntraVENous ONCE PRN Luz Maria Mills MD      
 obinutuzumab (GAZYVA) 1,000 mg in 0.9% sodium chloride 250 mL IVPB  1,000 mg IntraVENous ONCE Leyda Oliveira MD 25 mL/hr at 11/12/20 1628 1,000 mg at 11/12/20 1628  [START ON 11/13/2020] ondansetron (ZOFRAN) injection 8 mg  8 mg IntraVENous Q8H Luz Maria Mills MD      
 [START ON 11/13/2020] etoposide (VEPESID) 198 mg in 0.9% sodium chloride 500 mL chemo infusion  100 mg/m2 (Treatment Plan Recorded) IntraVENous Q24H MD Veronica Bojorquezty [START ON 11/14/2020] CARBOplatin (PARAPLATIN) 750 mg in 0.9% sodium chloride 250 mL chemo infusion  750 mg IntraVENous ONCE MD Clari Bojorquez.Putty [START ON 11/14/2020] ifosfamide (IFEX) 9,900 mg, mesna (MESNEX) 9,900 mg in 0.9% sodium chloride 1,000 mL chemo infusion  5,000 mg/m2 (Treatment Plan Recorded) IntraVENous CONTINUOUS MD Clari Bojorquez.Marcio Sierra Mariam ON 11/13/2020] meperidine (DEMEROL) injection 25 mg  25 mg IntraVENous PRN MD Clari Bojorquez.Putty Sierra Mariam ON 11/13/2020] hydrocortisone Sod Succ (PF) (SOLU-CORTEF) injection 100 mg  100 mg IntraVENous PRN Luz Maria Mills MD      
 acyclovir (ZOVIRAX) capsule 400 mg  400 mg Oral BID Clois Raw, NP      
 allopurinoL (ZYLOPRIM) tablet 300 mg  300 mg Oral BID Clois Raw, NP      
 [START ON 11/13/2020] amLODIPine (NORVASC) tablet 10 mg  10 mg Oral DAILY Clois Raw, NP      
  [START ON 2020] aspirin delayed-release tablet 81 mg  81 mg Oral DAILY Grayson Bruce NP      
 [START ON ] folic acid (FOLVITE) tablet 1 mg  1 mg Oral DAILY Grayson Bruce NP      
 lidocaine-prilocaine (EMLA) 2.5-2.5 % cream   Topical PRN ELADIA Mazariegos [START ON 2020] meloxicam (MOBIC) tablet 15 mg  15 mg Oral DAILY Grayson rBuce NP      
 [START ON 2020] pantoprazole (PROTONIX) tablet 40 mg  40 mg Oral DAILY Grayson Bruce NP      
 0.9% sodium chloride infusion  75 mL/hr IntraVENous CONTINUOUS Grayson Bruce NP 75 mL/hr at 20 1607 75 mL/hr at 20 1607  enoxaparin (LOVENOX) injection 40 mg  40 mg SubCUTAneous Q24H Grayson Bruce NP      
 magnesium oxide (MAG-OX) tablet 400 mg  400 mg Oral BID Grayson Bruce NP      
 ondansetron [facility]) injection 4 mg  4 mg IntraVENous Q4H PRN Grayson Bruce NP      
 prochlorperazine (COMPAZINE) with saline injection 10 mg  10 mg IntraVENous Q6H PRN Grayson Bruce NP      
 HYDROcodone-acetaminophen (NORCO) 5-325 mg per tablet 1 Tab  1 Tab Oral Q6H PRN Grayson Bruce NP      
 morphine injection 2 mg  2 mg IntraVENous Q4H PRN ELADIA Mazariegos [START ON 2020] fosaprepitant (EMEND) 150 mg in 0.9% sodium chloride 150 mL IVPB  150 mg IntraVENous ONCE Radha Simmons MD      
 
 
OBJECTIVE: 
Patient Vitals for the past 8 hrs: 
 BP Temp Pulse Resp SpO2 Height Weight 20 1521 100/70 98.3 °F (36.8 °C) (!) 107 18 100 %    
20 1350 (!) 142/97 98 °F (36.7 °C) (!) 116 18 100 % 5' 4\" (1.626 m) 183 lb 9.6 oz (83.3 kg) Temp (24hrs), Av °F (36.7 °C), Min:97.7 °F (36.5 °C), Max:98.3 °F (36.8 °C) No intake/output data recorded. Physical Exam: 
Constitutional: Well developed, well nourished female in no acute distress, sitting comfortably in the hospital bed. HEENT: Normocephalic and atraumatic. Oropharynx is clear, mucous membranes are moist.  Pupils are equal, round, and reactive to light.  Extraocular muscles are intact. Sclerae anicteric. Neck supple without JVD. No thyromegaly present. Lymph node No palpable submandibular, cervical, supraclavicular, axillary or inguinal lymph nodes. Skin Warm and dry. No bruising and no rash noted. No erythema. No pallor. Respiratory Lungs are clear to auscultation bilaterally without wheezes, rales or rhonchi, normal air exchange without accessory muscle use. CVS Normal rate, regular rhythm and normal S1 and S2. No murmurs, gallops, or rubs. Abdomen Soft, nontender and nondistended, normoactive bowel sounds. No palpable mass. No hepatosplenomegaly. Neuro Grossly nonfocal with no obvious sensory or motor deficits. MSK Normal range of motion in general.  No edema and no tenderness. Psych Appropriate mood and affect. Labs:   
Recent Results (from the past 24 hour(s)) CBC WITH AUTOMATED DIFF Collection Time: 11/12/20  9:42 AM  
Result Value Ref Range WBC 7.9 4.3 - 11.1 K/uL  
 RBC 2.96 (L) 4.05 - 5.25 M/uL HGB 7.8 (L) 11.7 - 15.4 g/dL HCT 25.2 (L) 35.8 - 46.3 % MCV 85.1 79.6 - 97.8 FL  
 MCH 26.4 26.1 - 32.9 PG  
 MCHC 31.0 (L) 31.4 - 35.0 g/dL RDW 21.3 (H) 11.9 - 14.6 % PLATELET 994 460 - 004 K/uL MPV 11.5 9.4 - 12.3 FL ABSOLUTE NRBC 0.09 0.0 - 0.2 K/uL  
 DF AUTOMATED NEUTROPHILS 79 (H) 43 - 78 % LYMPHOCYTES 4 (L) 13 - 44 % MONOCYTES 12 4.0 - 12.0 % EOSINOPHILS 0 (L) 0.5 - 7.8 % BASOPHILS 0 0.0 - 2.0 % IMMATURE GRANULOCYTES 4 0.0 - 5.0 %  
 ABS. NEUTROPHILS 6.3 1.7 - 8.2 K/UL  
 ABS. LYMPHOCYTES 0.3 (L) 0.5 - 4.6 K/UL  
 ABS. MONOCYTES 0.9 0.1 - 1.3 K/UL  
 ABS. EOSINOPHILS 0.0 0.0 - 0.8 K/UL  
 ABS. BASOPHILS 0.0 0.0 - 0.2 K/UL  
 ABS. IMM. GRANS. 0.3 0.0 - 0.5 K/UL METABOLIC PANEL, COMPREHENSIVE Collection Time: 11/12/20  9:42 AM  
Result Value Ref Range Sodium 139 136 - 145 mmol/L Potassium 3.8 3.5 - 5.1 mmol/L  Chloride 104 98 - 107 mmol/L  
 CO2 26 21 - 32 mmol/L  
 Anion gap 9 7 - 16 mmol/L Glucose 127 (H) 65 - 100 mg/dL BUN 7 6 - 23 MG/DL Creatinine 0.60 0.6 - 1.0 MG/DL  
 GFR est AA >60 >60 ml/min/1.73m2 GFR est non-AA >60 >60 ml/min/1.73m2 Calcium 9.4 8.3 - 10.4 MG/DL Bilirubin, total 0.6 0.2 - 1.1 MG/DL  
 ALT (SGPT) 41 12 - 65 U/L  
 AST (SGOT) 86 (H) 15 - 37 U/L Alk. phosphatase 710 (H) 50 - 136 U/L Protein, total 7.1 6.3 - 8.2 g/dL Albumin 3.3 (L) 3.5 - 5.0 g/dL Globulin 3.8 (H) 2.3 - 3.5 g/dL A-G Ratio 0.9 (L) 1.2 - 3.5 MAGNESIUM Collection Time: 11/12/20  9:42 AM  
Result Value Ref Range Magnesium 1.7 (L) 1.8 - 2.4 mg/dL Imaging: No images are attached to the encounter. ASSESSMENT: 
Problem List  Date Reviewed: 11/5/2020 Codes Class Noted Diffuse large B-cell lymphoma ([facility] [address] ICD-10-CM: C83.30 ICD-9-CM: 202.80  10/29/2020 Admission for antineoplastic chemotherapy ICD-10-CM: Z51.11 ICD-9-CM: V58.11  10/22/2020 Diffuse large B cell lymphoma (HCC) ICD-10-CM: C83.30 ICD-9-CM: 202.80  10/22/2020 DLBCL (diffuse large B cell lymphoma) (HCC) ICD-10-CM: C83.30 ICD-9-CM: 202.80  10/21/2020  
   
  
 
48 female, , non-smoker, history of DM, hypertension, lupus/RA (Dr Aye Armijo, on meloxicam, prednisone and plaquenil prn), distant history of b/l LE DVTs (multiple yrs ago), tubal ligation, w/ relapsed/refractory DLBCL under care of Dr Danae Kitchen. She is s/p RCHOP x 8, initially responding disease however scans w/ disease progression after 8 cycles. Hepatitis panel negative. HIV negative. On allopurinol 300 twice daily. Recent 2D ECHO with unremarkable EF. CT CAP slightly worse from the last PET scan with significant disease involving liver and spleen (imaging personally reviewed). Her initial outside pathology was internally read consistent with DLBCL, ABC type with CD30 positivity.   More recent liver biopsy consistent with relapsed disease. Seen today in office 11/12/20: Since last visit has completed ObinutuzumabICE C1. Tolerated reasonably. Reasonable p.o. intake. Some constipation. Has also undergone IT therapy with CSF analysis which was negative. Denies any recent fevers or chills. Reports abdominal discomfort/bloating sensation is somewhat improved, possibly related to an underlying response. Okay to proceed with cycle 2, will be hospitalized today for this. Continue IT MTX therapy w each cycle. Was unable to get port due to concern regarding cytopenias. S/p PICC line replacement, today notes  flushes well however withdrawal has been an issue. We will plan for port prior to next cycle. 
  
  
1. Recurrent DLBCL ABC subtype, presenting as stage IVB disease, CD30+ve (significant involvement of liver and spleen) 
  PLAN: 
- As above. Obinutuzumab-ICE. Udenyca support. OK to proceed w C2.  
- IT MTX prophylaxis x 5 - Allopurinol 300 twice daily - Repeat PET after 2 cycles. - Prophylaxis w Acyclovir. - Look into enrollment in [id number] with ibrutinib in association w ASCT. She will need to be on minimal steroids for this   
  
Follow-up in clinic for tox check within a week of discharge. Twice weekly labs with transfusions as needed on discharge. 
  
Lab studies and imaging studies (CT/CXR) were personally reviewed. Pertinent old records were reviewed. Kirsten Mcfarland MD 
[address] Office : [phone] Fax : [phone]

## 2020-11-13 NOTE — DISCHARGE SUMMARY
[facility]: Inpatient Hematology / Oncology Discharge Summary Note Patient ID: 
Sabiha Reynolds [id number] 
89 y.o. 
1970 Admit Date: 11/12/2020 Discharge Date: 11/18/2020 Admission Diagnoses: Admission for antineoplastic chemotherapy [Z51.11] Admission for antineoplastic chemotherapy [Z51.11] Discharge Diagnoses: 
Principal Diagnosis: <principal problem not specified> Active Problems: 
  Admission for antineoplastic chemotherapy (10/22/2020) Severe protein-calorie malnutrition ([facility] (11/16/2020) Hospital Course: 
Virgilio Fonseca is a 48 y.o female admitted on 11/12/2020 for C2 Obinutuzumad-ICE. She received IT MTX on 11/11. 
 
48 female, , non-smoker, history of DM, hypertension, lupus/RA (Dr Sierra Cervantes, on meloxicam, prednisone and plaquenil prn), distant history of b/l LE DVTs (multiple yrs ago), tubal ligation, w/ relapsed/refractory DLBCL under care of Dr Vladislav Dwyer. She is s/p RCHOP x 8, initially responding disease however scans w/ disease progression after 8 cycles. Hepatitis panel negative.  HIV negative.  On allopurinol 300 twice daily.  Recent 2D ECHO with unremarkable EF.   CT CAP slightly worse from the last PET scan with significant disease involving liver and spleen (imaging personally reviewed).  Her initial outside pathology was internally read consistent with DLBCL, ABC type with CD30 positivity.  More recent liver biopsy consistent with relapsed disease. Seen today in office 11/12/20: Since last visit has completed ObinutuzumabICE C1. Tolerated reasonably. Reasonable p.o. intake. Some constipation. Has also undergone IT therapy with CSF analysis which was negative. Denies any recent fevers or chills.   Reports abdominal discomfort/bloating sensation is somewhat improved, possibly related to an underlying response.   
 
 She tolerated treatment well without any complications. She will receive PRBC prior to discharge. She is feeling well and is ready for discharge home. She will need Udencya on Friday, IT already scheduled for Friday. She will also need follow up with NP within 1 week of discharge. Advised to call with fever, chills, uncontrollable symptoms, or with any other concerns. Consults: 
IP CONSULT TO INTERVENTIONAL RADIOLOGY Pertinent Diagnostic Studies:  
Labs:   
Recent Labs 11/18/20 0231 11/17/20 0226 11/16/20 0451 WBC 9.4 6.3 8.3 HGB 6.8* 7.3* 7.1*  
 285 274 ANEU 8.4* 5.9 6.0 Recent Labs 11/18/20 0231 11/17/20 0226 11/16/20 0451  141 141  
K 3.7 4.6 4.2 * 108* 107 CO2 24 26 29 * 168* 117* BUN 10 10 7 CREA 0.50* 0.60 0.52* CA 8.5 8.8 9.0 * 673* 637* TP 5.9* 6.4 6.2* ALB 2.7* 2.8* 2.7* MG 1.8 1.9 1.7* Imaging: 
n/a Current Discharge Medication List  
  
CONTINUE these medications which have CHANGED Details  
allopurinoL (ZYLOPRIM) 300 mg tablet Take 1 Tab by mouth two (2) times a day. Qty: 60 Tab, Refills: 0 CONTINUE these medications which have NOT CHANGED Details  
pantoprazole (PROTONIX) 40 mg tablet Take 1 Tab by mouth daily. Qty: 30 Tab, Refills: 3  
  
ondansetron hcl (ZOFRAN) 8 mg tablet Take 1 Tab by mouth every eight (8) hours as needed for Nausea or Vomiting. Qty: 90 Tab, Refills: 0  
  
acyclovir (ZOVIRAX) 200 mg capsule Take 2 Caps by mouth two (2) times a day for 30 days. Qty: 120 Cap, Refills: 0  
  
meloxicam (MOBIC) 15 mg tablet meloxicam 15 mg tablet 1 tablet daily  
  
triamcinolone acetonide (KENALOG) 0.1 % topical cream triamcinolone acetonide 0.1 % topical cream 
 apply to affected area twice daily 2 week on/ 2 weeks off as needed. amLODIPine (Norvasc) 10 mg tablet Norvasc 10 mg tablet Norvasc 10MG, 1 (one) Tablet Tablet daily # 30, 03/07/2014, Ref. x3. Active aspirin delayed-release 81 mg tablet Take 81 mg by mouth daily. calcipotriene (DOVONEX) 0.005 % topical cream calcipotriene 0.005 % topical cream 
 1 application topicall 2 times a day to legs  
  
clobetasoL (TEMOVATE) 0.05 % ointment clobetasol 0.05 % topical ointment 
 apply twice daily for 2 weeks on and 2 weeks off  
  
folic acid (FOLVITE) 1 mg tablet folic acid 1 mg tablet 1 tablet daily  
  
hydrOXYchloroQUINE (PLAQUENIL) 200 mg tablet hydroxychloroquine 200 mg tablet  
  
ketoconazole (NIZORAL) 2 % topical cream ketoconazole 2 % topical cream 
 1 application topically 2 times a day as needed for rash  
  
lidocaine-prilocaine (EMLA) topical cream Apply  to affected area as needed. promethazine (PHENERGAN) 12.5 mg tablet promethazine 12.5 mg tablet 1 tablet by mouth every 8 hours as needed for nausea and vomiting. predniSONE (DELTASONE) 50 mg tablet prednisone 50 mg tablet 
 take 2 tablets by mouth daily with breakfast  
  
  
 
OBJECTIVE: 
Patient Vitals for the past 8 hrs: 
 BP Temp Pulse Resp SpO2  
20 1031 103/80 98.8 °F (37.1 °C) 98 18 100 % 20 0730 100/87 98.3 °F (36.8 °C) 78 17 100 % Temp (24hrs), Av.1 °F (36.7 °C), Min:97.8 °F (36.6 °C), Max:98.8 °F (37.1 °C) No intake/output data recorded. Physical Exam: 
Constitutional: Well developed, well nourished female in no acute distress, sitting comfortably on the hospital bed. HEENT: Normocephalic and atraumatic. Oropharynx is clear, mucous membranes are moist.  Extraocular muscles are intact. Sclerae anicteric. Neck supple without JVD. No thyromegaly present. Skin Warm and dry. No bruising and no rash noted. No erythema. No pallor. Respiratory Lungs are clear to auscultation bilaterally without wheezes, rales or rhonchi, normal air exchange without accessory muscle use. CVS Normal rate, regular rhythm and normal S1 and S2. No murmurs, gallops, or rubs. Abdomen Soft, nontender and nondistended, normoactive bowel sounds. No palpable mass. No hepatosplenomegaly. Neuro Grossly nonfocal with no obvious sensory or motor deficits. MSK Normal range of motion in general.  No edema and no tenderness. Psych Appropriate mood and affect. ASSESSMENT: 
 
Active Problems: 
  Admission for antineoplastic chemotherapy (10/22/2020) Severe protein-calorie malnutrition ([address] (11/16/2020) DISPOSITION Follow-up Appointments Procedures  FOLLOW UP VISIT Appointment in: Other (Specify) udencya on Friday Tox check within 1 week of discharge. Navigation to arrange appointments  
  udencya on Friday Tox check within 1 week of discharge. Navigation to arrange appointments Standing Status:   Standing Number of Occurrences:   1 Order Specific Question:   Appointment in Answer: Other (Specify) Over 30 minutes was spent in discharge planning and coordination of care. Jennifer Armenta, OSCARP-C [facility] H[facility]ology [facility] Oncology [address] [place] [address] Office : [phone] Fax : [phone]

## 2020-11-13 NOTE — PROGRESS NOTES
Comprehensive Nutrition Assessment Type and Reason for Visit: Initial 
Best Practice Alert for EN/PN PTA Nutrition Recommendations/Plan: ? Discussed with pt need to add one snack a day and if weight loss continues need to add nutritional supplement as well. Also discussed need for high protein food at each meal. Suggested addition of half of sandwich orytogurt or milk with a meal of soup. Malnutrition Assessment: 
Malnutrition Status: Severe malnutrition Context: Chronic illness Findings of clinical characteristics of malnutrition:  
Energy Intake:  7 - 75% or less est energy requirements for 1 month or longer Weight Loss:  7.00 - Greater than 10% over 6 months Nutrition Assesment:  
Nutrition History:      Pt reports she notice her appetite and intake started to decline 1 year ago when it was thought she had a UTI, and her intake dropped to ~50%. She was diagnosed with lymphoma in February and had lost from 276# to ~246#. She started chemo and had some decreased tastes and mouth sores which both resolved. However her intake has remained at ~50% of her usual. Typical intake at home is dry cereal for breakfast, a light lunch of soup and then a full meal of meat, starch and vegetables for dinner. Her weight loss has continued with her last weight being 177# at cancer clinic yesterday. She declines offer of nutritional supplement and would like to focus on meal intake. She ate [employer] toast, eggs,wren, fruit cup, and juice for breakfast and chicken noodle soup, sherbet and Pepsi for lunch today. Pt jose any prior us eof TF or TPN. Nutrition Background: H/O: DM, HTN, lupus/RA, distant history of b/l LE DVTs. Admitted for chemo therapy for relapsed/refractory DLBC. Plan is for 5 IT chemos all together and complete 4 cycles of her therapy then possibly do a stem cell transplant. Current Nutrition Therapies: DIET REGULAR 
 
 Current Intake: Average Meal Intake: % Average Supplement Intake: None ordered Based on items orders for 2 meals, pt is meeting ~80% of kcal and ~55% of protein needs Anthropometric Measures: 
Height: 5' 4\" (162.6 cm) Current Body Wt: 83.3 kg (183 lb 10.3 oz), Weight source: Standing scale BMI: 31.5, Obese class 1 (BMI 30.0-34.9) UBW: Weight hx per EMR ( Based on connect care functionality, RD cannot know if these weight are actual versus stated): WT / BMI WEIGHT  
11/12/2020 177 lb 4.8 oz  
11/12/2020 183 lb 10.3 oz  
11/11/2020 177 lb  
11/5/2020 180 lb 8 oz  
11/2/2020 190 lb  
10/29/2020 190 lb  
10/25/2020 196 lb 6.4 oz  
10/23/2020 190 lb  
10/22/2020 187 lb 8 oz  
10/13/2020 195 lb  
10/7/2020 197 lb 6.4 oz  
  10 % change ( 197# to 177#) in wt within < 2 months c/w severe change. 36% change in 1 year (276# to 17#) in 1 year c/w severe change as well Estimated Daily Nutrient Needs: 
Energy (kcal): 1689-8978 kcal/d (20-25 kcal/kg)-severe wt loss (Kcal/kg, Weight Used: Current(Standing scale 83.3 kg  on 11-)) Protein (g):  grams/d (20% of kcal) Nutrition Diagnosis: · Severe malnutrition, In context of chronic illness related to (increased metabolic needs(cancer), intake < needs) as evidenced by (weight loss as above, intake <75% of needs for > 1 month) Nutrition Interventions:  
Food and/or Nutrient Delivery: Continue current diet Nutrition Education/Counseling: Discussed that level of weight loss has been severe and goal prevent or minimize further weight loss. Instrcted to add one snack per day. Handouts provided: Nutrition and Cancer treatment, High protein breakfast and lunch ideas, high protein high calorie snacks, high protein high calorie hints,mini meal ideas Goals:   
Weight loss <5 # within 1 month Nutrition Monitoring and Evaluation:  
  
Food/Nutrient Intake Outcomes: Food and nutrient intake Physical Signs/Symptoms Outcomes: Weight Discharge Planning:   
Continue current diet Scott Paige, [address], [address] [place] [address], [address] [place] [address]

## 2020-11-13 NOTE — PROGRESS NOTES
Problem: Falls - Risk of 
Goal: *Absence of Falls Description: Document Ivin Velásquez Fall Risk and appropriate interventions in the flowsheet. Outcome: Progressing Towards Goal 
Note: Fall Risk Interventions: 
  
 
  
 
Medication Interventions: Assess postural VS orthostatic hypotension

## 2020-11-13 NOTE — PROGRESS NOTES
Problem: Falls - Risk of 
Goal: *Absence of Falls Description: Document Keshawn Mon Fall Risk and appropriate interventions in the flowsheet. Outcome: Progressing Towards Goal 
Note: Fall Risk Interventions: 
  
 
  
 
Medication Interventions: Assess postural VS orthostatic hypotension Problem: Patient Education: Go to Patient Education Activity Goal: Patient/Family Education Outcome: Progressing Towards Goal

## 2020-11-13 NOTE — PROGRESS NOTES
Pt started Anibal today. Tolerating well. PICC giving very small amount of blood return. States this is her second PICC.

## 2020-11-13 NOTE — PROGRESS NOTES
Care Management Interventions PCP Verified by CM: Yes(Dr. Jaimee Pena) Discharge Durable Medical Equipment: No 
Physical Therapy Consult: No 
Occupational Therapy Consult: No 
Current Support Network: Own Home, Lives with Spouse Discharge Location Discharge Placement: Home CM met with patient at bedside. Proper PPE in place and social distance maintained. Demographics and insurance verified. PCP is Dr. Jaimee Pena; updated in [address] [place] [address]. Patient states she lives in home with her spouse and two children. She states she is normally independent with ADLs and drives. No DME. Current plan is to discharge home with family. No discharge needs identified. Please notify CM if needs arise.

## 2020-11-13 NOTE — PROGRESS NOTES
[facility] Hematology & Oncology Inpatient Hematology / Oncology Progress Note Admission Date: 2020  1:43 PM 
Reason for Admission/Hospital Course: Admission for antineoplastic chemotherapy [Z51.11] 24 Hour Events: 
Afebrile, VSS 
C2D2 Obinutuzumab-ICE Rec'd IT MTX on  Tolerating treatment well Transfusions: None Replacements: None ROS: 
Constitutional: Negative for fever, chills, weakness, malaise, fatigue. CV: Negative for chest pain, palpitations, edema. Respiratory: Negative for dyspnea, cough, wheezing. GI: Negative for nausea, abdominal pain, diarrhea. 10 point review of systems is otherwise negative with the exception of the elements mentioned above in the HPI. No Known Allergies OBJECTIVE: 
Patient Vitals for the past 8 hrs: 
 BP Temp Pulse Resp SpO2  
20 1143 110/82 97.5 °F (36.4 °C) 93 18 100 % 20 0741 102/62 97.8 °F (36.6 °C) 93 20 99 % Temp (24hrs), Av.8 °F (36.6 °C), Min:97.4 °F (36.3 °C), Max:98.3 °F (36.8 °C) 
 
 0701 -  1900 In: 2236 [P.O.:360; I.V.:1876] Out: 900 [Urine:900] Physical Exam: 
Constitutional: Well developed, well nourished female in no acute distress, sitting comfortably in the hospital bed. HEENT: Normocephalic and atraumatic. Oropharynx is clear, mucous membranes are moist.  Extraocular muscles are intact. Sclerae anicteric. Neck supple without JVD. No thyromegaly present. Skin Warm and dry. No bruising and no rash noted. No erythema. No pallor. Skin discoloration noted on BLE, L>R. Respiratory Lungs are clear to auscultation bilaterally without wheezes, rales or rhonchi, normal air exchange without accessory muscle use. CVS Normal rate, regular rhythm and normal S1 and S2. No murmurs, gallops, or rubs. Abdomen Soft, nontender and nondistended, normoactive bowel sounds. No palpable mass. No hepatosplenomegaly. Neuro Grossly nonfocal with no obvious sensory or motor deficits. MSK Normal range of motion in general.  No edema and no tenderness. Psych Appropriate mood and affect. Labs: 
   
Recent Labs 11/13/20 
0529 11/12/20 
7191 WBC 6.9 7.9  
RBC 2.74* 2.96* HGB 7.3* 7.8* HCT 23.6* 25.2*  
MCV 86.1 85.1 MCH 26.6 26.4 MCHC 30.9* 31.0*  
RDW 20.8* 21.3*  
 201 GRANS 90* 79* LYMPH 4* 4*  
MONOS 3* 12 EOS 0* 0*  
BASOS 0 0 IG 4 4 DF AUTOMATED AUTOMATED ANEU 6.2 6.3 ABL 0.3* 0.3* ABM 0.2 0.9 TYE 0.0 0.0 ABB 0.0 0.0 AIG 0.3 0.3 Recent Labs 11/13/20 
0529 11/12/20 
8049  139  
K 4.1 3.8 * 104 CO2 28 26 AGAP 4* 9 * 127* BUN 7 7 CREA 0.56* 0.60 GFRAA >60 >60 GFRNA >60 >60  
CA 9.2 9.4 * 710* TP 6.8 7.1 ALB 2.9* 3.3*  
GLOB 3.9* 3.8* AGRAT 0.7* 0.9* MG 1.8 1.7* Imaging: n/a Medications: 
Current Facility-Administered Medications Medication Dose Route Frequency  influenza vaccine 2020-21 (6 mos+)(PF) (FLUARIX/FLULAVAL/FLUZONE QUAD) injection 0.5 mL  0.5 mL IntraMUSCular PRIOR TO DISCHARGE  
 0.9% sodium chloride infusion  25 mL/hr IntraVENous CONTINUOUS  
 diphenhydrAMINE (BENADRYL) injection 50 mg  50 mg IntraVENous ONCE PRN  
 ondansetron (ZOFRAN) injection 8 mg  8 mg IntraVENous Q8H  
 etoposide (VEPESID) 198 mg in 0.9% sodium chloride 500 mL chemo infusion  100 mg/m2 (Treatment Plan Recorded) IntraVENous Q24H  
 [START ON 11/14/2020] CARBOplatin (PARAPLATIN) 750 mg in 0.9% sodium chloride 250 mL chemo infusion  750 mg IntraVENous ONCE  
 [START ON 11/14/2020] ifosfamide (IFEX) 9,900 mg, mesna (MESNEX) 9,900 mg in 0.9% sodium chloride 1,000 mL chemo infusion  5,000 mg/m2 (Treatment Plan Recorded) IntraVENous CONTINUOUS  
 meperidine (DEMEROL) injection 25 mg  25 mg IntraVENous PRN  
 hydrocortisone Sod Succ (PF) (SOLU-CORTEF) injection 100 mg  100 mg IntraVENous PRN  
  acyclovir (ZOVIRAX) capsule 400 mg  400 mg Oral BID  allopurinoL (ZYLOPRIM) tablet 300 mg  300 mg Oral BID  amLODIPine (NORVASC) tablet 10 mg  10 mg Oral DAILY  aspirin delayed-release tablet 81 mg  81 mg Oral DAILY  folic acid (FOLVITE) tablet 1 mg  1 mg Oral DAILY  lidocaine-prilocaine (EMLA) 2.5-2.5 % cream   Topical PRN  
 meloxicam (MOBIC) tablet 15 mg  15 mg Oral DAILY  pantoprazole (PROTONIX) tablet 40 mg  40 mg Oral DAILY  0.9% sodium chloride infusion  75 mL/hr IntraVENous CONTINUOUS  
 enoxaparin (LOVENOX) injection 40 mg  40 mg SubCUTAneous Q24H  
 magnesium oxide (MAG-OX) tablet 400 mg  400 mg Oral BID  ondansetron (ZOFRAN) injection 4 mg  4 mg IntraVENous Q4H PRN  prochlorperazine (COMPAZINE) with saline injection 10 mg  10 mg IntraVENous Q6H PRN  
 HYDROcodone-acetaminophen (NORCO) 5-325 mg per tablet 1 Tab  1 Tab Oral Q6H PRN  
 morphine injection 2 mg  2 mg IntraVENous Q4H PRN  
 fosaprepitant (EMEND) 150 mg in 0.9% sodium chloride 150 mL IVPB  150 mg IntraVENous ONCE  
 
 
 
ASSESSMENT: 
 
Problem List  Date Reviewed: 11/5/2020 Codes Class Noted Diffuse large B-cell lymphoma ([facility] ICD-10-CM: C83.30 ICD-9-CM: 202.80  10/29/2020 Admission for antineoplastic chemotherapy ICD-10-CM: Z51.11 ICD-9-CM: V58.11  10/22/2020 Diffuse large B cell lymphoma (HCC) ICD-10-CM: C83.30 ICD-9-CM: 202.80  10/22/2020 DLBCL (diffuse large B cell lymphoma) (HCC) ICD-10-CM: C83.30 ICD-9-CM: 202.80  10/21/2020  
   
  
 
48 female, , non-smoker, history of DM, hypertension, lupus/RA (Dr Nadir Spicer, on meloxicam, prednisone and plaquenil prn), distant history of b/l LE DVTs (multiple yrs ago), tubal ligation, w/ relapsed/refractory DLBCL under care of Dr Charli Holloway. She is s/p RCHOP x 8, initially responding disease however scans w/ disease progression after 8 cycles. Hepatitis panel negative.   HIV negative.  On allopurinol 300 twice daily.  Recent 2D ECHO with unremarkable EF.   CT CAP slightly worse from the last PET scan with significant disease involving liver and spleen (imaging personally reviewed).  Her initial outside pathology was internally read consistent with DLBCL, ABC type with CD30 positivity.  M-ore recent liver biopsy consistent with relapsed disease. Seen today in office 11/12/20: Since last visit has completed ObinutuzumabICE C1. Tolerated reasonably. Reasonable p.o. intake. Some constipation. Has also undergone IT therapy with CSF analysis which was negative. Denies any recent fevers or chills. Reports abdominal discomfort/bloating sensation is somewhat improved, possibly related to an underlying response.   Okay to proceed with cycle 2, will be hospitalized today for this. Continue IT MTX therapy w each cycle. Was unable to get port due to concern regarding cytopenias. S/p PICC line replacement, today notes  flushes well however withdrawal has been an issue. We will plan for port prior to next cycle. 
  
 
PLAN: 
Relapsed DLBCL 
- As above. Obinutuzumab-ICE. Udenyca support. OK to proceed w C2.  
- IT MTX prophylaxis x 5 - Allopurinol 300 twice daily - Repeat PET after 2 cycles. - Prophylaxis w Acyclovir. - Look into enrollment in [id number] with ibrutinib in association w ASCT. She will need to be on minimal steroids for this   
11/13 C2D2 Obinutuzumab-ICE. Rec'd IT MTX on 11/11. Tolerating treatment well Hx lupus/RA 
- on meloxicam, prednisone, and plaquenil prn Anemia secondary to chemotherapy - Transfuse prn per Manisha SOPs 
11/13 Check iron studies, B12, folate in AM 
 
Elevated LFTs 
11/13 Improving Continue home meds Prophylactic Antibx:  Acyclovir Manisha SOPs Lovenox for DVT prophylaxis (hold for plt <50k) Goals and plan of care reviewed with the patient. All questions answered to the best of our ability. Disposition:  Anticipate discharge home after the completion of chemotherapy on Sun, 11/15. Amarilis White, ELADIA [address] [facility] Hematology & Oncology [address] [place] [address] Office : [phone] Fax : [phone]

## 2020-11-13 NOTE — PROGRESS NOTES
Problem: Falls - Risk of 
Goal: *Absence of Falls Description: Document Ivin Velásquez Fall Risk and appropriate interventions in the flowsheet. 11/12/2020 2203 by Yovany Sepulveda RN Outcome: Progressing Towards Goal 
Note: Fall Risk Interventions: 
  
 
  
 
Medication Interventions: Assess postural VS orthostatic hypotension 11/12/2020 2202 by Yovany Sepulveda RN Outcome: Progressing Towards Goal 
Note: Fall Risk Interventions: 
  
 
  
 
Medication Interventions: Assess postural VS orthostatic hypotension

## 2020-11-14 NOTE — PROGRESS NOTES
Notified ELADIA Rodriguez of picc line bleeding through dressing. Orders received to pull picc line. Will remove picc and apply pressure dressing.

## 2020-11-14 NOTE — PROGRESS NOTES
[facility] Hematology & Oncology Inpatient Hematology / Oncology Progress Note Admission Date: 2020  1:43 PM 
Reason for Admission/Hospital Course: Admission for antineoplastic chemotherapy [Z51.11] 24 Hour Events: 
Afebrile, VSS 
C2D2 yesterday - interrupted due to access Port replacement planned for Monday Rec'd IT MTX on  Transfusions: None Replacements: None ROS: 
Constitutional: Negative for fever, chills, weakness, malaise, fatigue. CV: Negative for chest pain, palpitations, edema. Respiratory: Negative for dyspnea, cough, wheezing. GI: Negative for nausea, abdominal pain, diarrhea. 10 point review of systems is otherwise negative with the exception of the elements mentioned above in the HPI. No Known Allergies OBJECTIVE: 
Patient Vitals for the past 8 hrs: 
 BP Temp Pulse Resp SpO2  
20 1120 112/77 97.6 °F (36.4 °C) 88 18 99 % 20 0740 114/82 97.8 °F (36.6 °C) 91 18 100 % Temp (24hrs), Av.7 °F (36.5 °C), Min:97.3 °F (36.3 °C), Max:98.1 °F (36.7 °C) 
 
 0701 -  1900 In: 240 [P.O.:240] Out: 1600 [Urine:1600] Physical Exam: 
Constitutional: Well developed, well nourished female in no acute distress, sitting comfortably in the hospital bed. HEENT: Normocephalic and atraumatic. Oropharynx is clear, mucous membranes are moist.  Extraocular muscles are intact. Sclerae anicteric. Neck supple without JVD. No thyromegaly present. Skin Warm and dry. No bruising and no rash noted. No erythema. No pallor. Skin discoloration noted on BLE, L>R. Respiratory Lungs are clear to auscultation bilaterally without wheezes, rales or rhonchi, normal air exchange without accessory muscle use. CVS Normal rate, regular rhythm and normal S1 and S2. No murmurs, gallops, or rubs. Abdomen Soft, nontender and nondistended, normoactive bowel sounds. No palpable mass. No hepatosplenomegaly. Neuro Grossly nonfocal with no obvious sensory or motor deficits. MSK Normal range of motion in general.  No edema and no tenderness. Psych Appropriate mood and affect. Labs: 
   
Recent Labs 11/14/20 
0320 11/13/20 
0529 11/12/20 
2491 WBC 15.5* 6.9 7.9  
RBC 2.74* 2.74* 2.96* HGB 7.3* 7.3* 7.8* HCT 23.9* 23.6* 25.2*  
MCV 87.2 86.1 85.1 MCH 26.6 26.6 26.4 MCHC 30.5* 30.9* 31.0*  
RDW 21.1* 20.8* 21.3*  
 195 201 GRANS 88* 90* 79* LYMPH 2* 4* 4*  
MONOS 7 3* 12 EOS 0* 0* 0*  
BASOS 0 0 0 IG 3 4 4 DF AUTOMATED AUTOMATED AUTOMATED ANEU 13.7* 6.2 6.3 ABL 0.3* 0.3* 0.3* ABM 1.2 0.2 0.9 TYE 0.0 0.0 0.0 ABB 0.0 0.0 0.0 AIG 0.4 0.3 0.3 Recent Labs 11/14/20 
2883 11/13/20 
0529 11/12/20 
3549  141 139  
K 3.9 4.1 3.8 * 109* 104 CO2 28 28 26 AGAP 7 4* 9 * 150* 127* BUN 8 7 7 CREA 0.58* 0.56* 0.60 GFRAA >60 >60 >60 GFRNA >60 >60 >60  
CA 9.1 9.2 9.4 * 643* 710* TP 6.6 6.8 7.1 ALB 2.8* 2.9* 3.3*  
GLOB 3.8* 3.9* 3.8* AGRAT 0.7* 0.7* 0.9* MG 1.9 1.8 1.7* Imaging: n/a Medications: 
Current Facility-Administered Medications Medication Dose Route Frequency  influenza vaccine 2020-21 (6 mos+)(PF) (FLUARIX/FLULAVAL/FLUZONE QUAD) injection 0.5 mL  0.5 mL IntraMUSCular PRIOR TO DISCHARGE  
 0.9% sodium chloride infusion  25 mL/hr IntraVENous CONTINUOUS  
 ondansetron (ZOFRAN) injection 8 mg  8 mg IntraVENous Q8H  
 etoposide (VEPESID) 198 mg in 0.9% sodium chloride 500 mL chemo infusion  100 mg/m2 (Treatment Plan Recorded) IntraVENous Q24H  
 CARBOplatin (PARAPLATIN) 750 mg in 0.9% sodium chloride 250 mL chemo infusion  750 mg IntraVENous ONCE  
 ifosfamide (IFEX) 9,900 mg, mesna (MESNEX) 9,900 mg in 0.9% sodium chloride 1,000 mL chemo infusion  5,000 mg/m2 (Treatment Plan Recorded) IntraVENous CONTINUOUS  
 meperidine (DEMEROL) injection 25 mg  25 mg IntraVENous PRN  
  acyclovir (ZOVIRAX) capsule 400 mg  400 mg Oral BID  allopurinoL (ZYLOPRIM) tablet 300 mg  300 mg Oral BID  amLODIPine (NORVASC) tablet 10 mg  10 mg Oral DAILY  aspirin delayed-release tablet 81 mg  81 mg Oral DAILY  folic acid (FOLVITE) tablet 1 mg  1 mg Oral DAILY  lidocaine-prilocaine (EMLA) 2.5-2.5 % cream   Topical PRN  
 meloxicam (MOBIC) tablet 15 mg  15 mg Oral DAILY  pantoprazole (PROTONIX) tablet 40 mg  40 mg Oral DAILY  0.9% sodium chloride infusion  75 mL/hr IntraVENous CONTINUOUS  
 enoxaparin (LOVENOX) injection 40 mg  40 mg SubCUTAneous Q24H  
 magnesium oxide (MAG-OX) tablet 400 mg  400 mg Oral BID  ondansetron (ZOFRAN) injection 4 mg  4 mg IntraVENous Q4H PRN  prochlorperazine (COMPAZINE) with saline injection 10 mg  10 mg IntraVENous Q6H PRN  
 HYDROcodone-acetaminophen (NORCO) 5-325 mg per tablet 1 Tab  1 Tab Oral Q6H PRN  
 morphine injection 2 mg  2 mg IntraVENous Q4H PRN  
 
 
 
ASSESSMENT: 
 
Problem List  Date Reviewed: 11/5/2020 Codes Class Noted Diffuse large B-cell lymphoma ([facility] [address] ICD-10-CM: C83.30 ICD-9-CM: 202.80  10/29/2020 Admission for antineoplastic chemotherapy ICD-10-CM: Z51.11 ICD-9-CM: V58.11  10/22/2020 Diffuse large B cell lymphoma (HCC) ICD-10-CM: C83.30 ICD-9-CM: 202.80  10/22/2020 DLBCL (diffuse large B cell lymphoma) (HCC) ICD-10-CM: C83.30 ICD-9-CM: 202.80  10/21/2020  
   
  
 
48 female, , non-smoker, history of DM, hypertension, lupus/RA (Dr Nadir Spicer, on meloxicam, prednisone and plaquenil prn), distant history of b/l LE DVTs (multiple yrs ago), tubal ligation, w/ relapsed/refractory DLBCL under care of Dr Charli Holloway. She is s/p RCHOP x 8, initially responding disease however scans w/ disease progression after 8 cycles. Hepatitis panel negative.   HIV negative.  On allopurinol 300 twice daily.  Recent 2D ECHO with unremarkable EF.   CT CAP slightly worse from the last PET scan with significant disease involving liver and spleen (imaging personally reviewed). Alice Luz initial outside pathology was internally read consistent with DLBCL, ABC type with CD30 positivity.  M-ore recent liver biopsy consistent with relapsed disease. Seen today in office 11/12/20: Since last visit has completed ObinutuzumabICE C1. Tolerated reasonably. Reasonable p.o. intake. Some constipation. Has also undergone IT therapy with CSF analysis which was negative. Denies any recent fevers or chills. Reports abdominal discomfort/bloating sensation is somewhat improved, possibly related to an underlying response.   Okay to proceed with cycle 2, will be hospitalized today for this. Continue IT MTX therapy w each cycle. Was unable to get port due to concern regarding cytopenias. S/p PICC line replacement, today notes  flushes well however withdrawal has been an issue. We will plan for port prior to next cycle. 
  
 
PLAN: 
Relapsed DLBCL 
- As above. Obinutuzumab-ICE. Udenyca support. OK to proceed w C2.  
- IT MTX prophylaxis x 5 - Allopurinol 300 twice daily - Repeat PET after 2 cycles. - Prophylaxis w Acyclovir. - Look into enrollment in [id number] with ibrutinib in association w ASCT. She will need to be on minimal steroids for this   
11/13 C2D2 Obinutuzumab-ICE. Rec'd IT MTX on 11/11. Tolerating treatment well 
11/14 Issues with PICC line yesterday leaking at site of insertion. Trial of cathflo with bleeding at site. PICC line pulled. Current port to be replaced on Monday per IR. Will hold treatment given lack of IV access (difficulty with IV access in past and PICC had to be placed per IR). Leukocytosis noted - likely reactive given PICC manipulation and recent steroids with obinutuzumab. Hx lupus/RA 
- on meloxicam, prednisone, and plaquenil prn Anemia secondary to chemotherapy - Transfuse prn per Manisha SOPs 
11/13 Check iron studies, B12, folate in AM 
 11/14 Hgb 7.3 this morning - Fe studies pending. B12 pending. Folate adequate. Elevated LFTs 
11/13 Improving 11/14 Improving. Continue to monitor. Continue home meds Prophylactic Antibx:  Acyclovir Manisha SOPs Lovenox for DVT prophylaxis (hold for plt <50k) Goals and plan of care reviewed with the patient. All questions answered to the best of our ability. Disposition:  Anticipate discharge home after the completion of chemotherapy - currently on hold due to IV access issues. HUYEN Egan [facility] Hematology & Oncology [address] [place] [address] Office : [phone] Fax : [phone]

## 2020-11-14 NOTE — PROGRESS NOTES
Problem: Falls - Risk of 
Goal: *Absence of Falls Description: Document Beltran Lucio Fall Risk and appropriate interventions in the flowsheet. Outcome: Progressing Towards Goal 
Note: Fall Risk Interventions: 
  
 
  
 
Medication Interventions: Evaluate medications/consider consulting pharmacy Problem: Patient Education: Go to Patient Education Activity Goal: Patient/Family Education Outcome: Progressing Towards Goal 
  
Problem: Nutrition Deficit Goal: *Optimize nutritional status Outcome: Progressing Towards Goal

## 2020-11-14 NOTE — PROGRESS NOTES
Problem: Falls - Risk of 
Goal: *Absence of Falls Description: Document Betty Sigala Fall Risk and appropriate interventions in the flowsheet. Outcome: Progressing Towards Goal 
Note: Fall Risk Interventions: 
  
 
  
 
Medication Interventions: Evaluate medications/consider consulting pharmacy

## 2020-11-14 NOTE — PROGRESS NOTES
END OF SHIFT: 
 
VSS. Afebrile. Pt rested well during the night. PICC line removed at the beginning of the shift. No needs voiced

## 2020-11-15 NOTE — PROGRESS NOTES
Problem: Falls - Risk of 
Goal: *Absence of Falls Description: Document Richar Mooreasin Fall Risk and appropriate interventions in the flowsheet. Outcome: Progressing Towards Goal 
Note: Fall Risk Interventions: 
  
 
  
 
Medication Interventions: Evaluate medications/consider consulting pharmacy Problem: Patient Education: Go to Patient Education Activity Goal: Patient/Family Education Outcome: Progressing Towards Goal 
  
Problem: Nutrition Deficit Goal: *Optimize nutritional status Outcome: Progressing Towards Goal

## 2020-11-15 NOTE — PROGRESS NOTES
[address] [facility] Hematology & Oncology Inpatient Hematology / Oncology Progress Note Admission Date: 2020  1:43 PM 
Reason for Admission/Hospital Course: Admission for antineoplastic chemotherapy [Z51.11] 24 Hour Events: 
Afebrile, VSS 
C2D2  - interrupted due to access Port replacement planned for Monday per IR Rec'd IT MTX on  Transfusions: None Replacements: Mg/KCl (PO due to access) ROS: 
Constitutional: Negative for fever, chills, weakness, malaise, fatigue. CV: Negative for chest pain, palpitations, edema. Respiratory: Negative for dyspnea, cough, wheezing. GI: Negative for nausea, abdominal pain, diarrhea. 10 point review of systems is otherwise negative with the exception of the elements mentioned above in the HPI. No Known Allergies OBJECTIVE: 
Patient Vitals for the past 8 hrs: 
 BP Temp Pulse Resp SpO2  
11/15/20 1120 97/63 98.2 °F (36.8 °C) (!) 104 18 97 % 11/15/20 0740 105/76 98.4 °F (36.9 °C) (!) 104 18 99 % Temp (24hrs), Av.1 °F (36.7 °C), Min:97.7 °F (36.5 °C), Max:98.4 °F (36.9 °C) 
 
11/15 0701 - 11/15 1900 In: 240 [P.O.:240] Out: 1200 [Urine:1200] Physical Exam: 
Constitutional: Well developed, well nourished female in no acute distress, sitting comfortably in the hospital bed. HEENT: Normocephalic and atraumatic. Oropharynx is clear, mucous membranes are moist.  Extraocular muscles are intact. Sclerae anicteric. Neck supple without JVD. No thyromegaly present. Skin Warm and dry. No bruising and no rash noted. No erythema. No pallor. Skin discoloration noted on BLE, L>R. Respiratory Lungs are clear to auscultation bilaterally without wheezes, rales or rhonchi, normal air exchange without accessory muscle use. CVS Normal rate, regular rhythm and normal S1 and S2. No murmurs, gallops, or rubs. Abdomen Soft, nontender and nondistended, normoactive bowel sounds. No palpable mass. No hepatosplenomegaly. Neuro Grossly nonfocal with no obvious sensory or motor deficits. MSK Normal range of motion in general.  No edema and no tenderness. Psych Appropriate mood and affect. Labs: 
   
Recent Labs 11/15/20 
0315 11/14/20 
0536 11/13/20 
8101 WBC 13.4* 15.5* 6.9  
RBC 2.78* 2.74* 2.74* HGB 7.3* 7.3* 7.3* HCT 23.9* 23.9* 23.6* MCV 86.0 87.2 86.1 MCH 26.3 26.6 26.6 MCHC 30.5* 30.5* 30.9*  
RDW 21.5* 21.1* 20.8*  248 195 GRANS 81* 88* 90* LYMPH 2* 2* 4*  
MONOS 12 7 3* EOS 0* 0* 0*  
BASOS 0 0 0 IG 5 3 4 DF AUTOMATED AUTOMATED AUTOMATED ANEU 10.9* 13.7* 6.2 ABL 0.2* 0.3* 0.3* ABM 1.6* 1.2 0.2 TYE 0.0 0.0 0.0 ABB 0.0 0.0 0.0 AIG 0.7* 0.4 0.3 Recent Labs 11/15/20 
0315 11/14/20 
0536 11/13/20 
5322  143 141  
K 3.4* 3.9 4.1 * 108* 109* CO2 28 28 28 AGAP 7 7 4* * 121* 150* BUN 11 8 7 CREA 0.57* 0.58* 0.56* GFRAA >60 >60 >60 GFRNA >60 >60 >60  
CA 9.0 9.1 9.2 * 611* 643* TP 6.4 6.6 6.8 ALB 2.9* 2.8* 2.9*  
GLOB 3.5 3.8* 3.9* AGRAT 0.8* 0.7* 0.7* MG 1.7* 1.9 1.8 Imaging: n/a Medications: 
Current Facility-Administered Medications Medication Dose Route Frequency  influenza vaccine 2020-21 (6 mos+)(PF) (FLUARIX/FLULAVAL/FLUZONE QUAD) injection 0.5 mL  0.5 mL IntraMUSCular PRIOR TO DISCHARGE  
 0.9% sodium chloride infusion  25 mL/hr IntraVENous CONTINUOUS  
 ondansetron (ZOFRAN) injection 8 mg  8 mg IntraVENous Q8H  
 etoposide (VEPESID) 198 mg in 0.9% sodium chloride 500 mL chemo infusion  100 mg/m2 (Treatment Plan Recorded) IntraVENous Q24H  
 ifosfamide (IFEX) 9,900 mg, mesna (MESNEX) 9,900 mg in 0.9% sodium chloride 1,000 mL chemo infusion  5,000 mg/m2 (Treatment Plan Recorded) IntraVENous CONTINUOUS  
 meperidine (DEMEROL) injection 25 mg  25 mg IntraVENous PRN  
 acyclovir (ZOVIRAX) capsule 400 mg  400 mg Oral BID  allopurinoL (ZYLOPRIM) tablet 300 mg  300 mg Oral BID  
  amLODIPine (NORVASC) tablet 10 mg  10 mg Oral DAILY  aspirin delayed-release tablet 81 mg  81 mg Oral DAILY  folic acid (FOLVITE) tablet 1 mg  1 mg Oral DAILY  lidocaine-prilocaine (EMLA) 2.5-2.5 % cream   Topical PRN  
 meloxicam (MOBIC) tablet 15 mg  15 mg Oral DAILY  pantoprazole (PROTONIX) tablet 40 mg  40 mg Oral DAILY  0.9% sodium chloride infusion  75 mL/hr IntraVENous CONTINUOUS  
 enoxaparin (LOVENOX) injection 40 mg  40 mg SubCUTAneous Q24H  
 magnesium oxide (MAG-OX) tablet 400 mg  400 mg Oral BID  ondansetron (ZOFRAN) injection 4 mg  4 mg IntraVENous Q4H PRN  prochlorperazine (COMPAZINE) with saline injection 10 mg  10 mg IntraVENous Q6H PRN  
 HYDROcodone-acetaminophen (NORCO) 5-325 mg per tablet 1 Tab  1 Tab Oral Q6H PRN  
 morphine injection 2 mg  2 mg IntraVENous Q4H PRN  
 
 
 
ASSESSMENT: 
 
Problem List  Date Reviewed: 11/5/2020 Codes Class Noted [facility] ICD-10-CM: C83.30 ICD-9-CM: 202.80  10/29/2020 Admission for antineoplastic chemotherapy ICD-10-CM: Z51.11 ICD-9-CM: V58.11  10/22/2020 Diffuse large B cell lymphoma (HCC) ICD-10-CM: C83.30 ICD-9-CM: 202.80  10/22/2020 DLBCL (diffuse large B cell lymphoma) (HCC) ICD-10-CM: C83.30 ICD-9-CM: 202.80  10/21/2020  
   
  
 
48 female, , non-smoker, history of DM, hypertension, lupus/RA (Dr Saroj Tinoco, on meloxicam, prednisone and plaquenil prn), distant history of b/l LE DVTs (multiple yrs ago), tubal ligation, w/ relapsed/refractory DLBCL under care of Dr Estela Martinez. She is s/p RCHOP x 8, initially responding disease however scans w/ disease progression after 8 cycles. Hepatitis panel negative.   HIV negative.  On allopurinol 300 twice daily.  Recent 2D ECHO with unremarkable EF.   CT CAP slightly worse from the last PET scan with significant disease involving liver and spleen (imaging personally reviewed).  Her initial outside pathology was internally read consistent with DLBCL, ABC type with CD30 positivity.  M-ore recent liver biopsy consistent with relapsed disease. Seen today in office 11/12/20: Since last visit has completed ObinutuzumabICE C1. Tolerated reasonably. Reasonable p.o. intake. Some constipation. Has also undergone IT therapy with CSF analysis which was negative. Denies any recent fevers or chills. Reports abdominal discomfort/bloating sensation is somewhat improved, possibly related to an underlying response.   Okay to proceed with cycle 2, will be hospitalized today for this. Continue IT MTX therapy w each cycle. Was unable to get port due to concern regarding cytopenias. S/p PICC line replacement, today notes  flushes well however withdrawal has been an issue. We will plan for port prior to next cycle. 
  
 
PLAN: 
Relapsed DLBCL 
- As above. Obinutuzumab-ICE. Udenyca support. OK to proceed w C2.  
- IT MTX prophylaxis x 5 - Allopurinol 300 twice daily - Repeat PET after 2 cycles. - Prophylaxis w Acyclovir. - Look into enrollment in [id number] with ibrutinib in association w ASCT. She will need to be on minimal steroids for this   
11/13 C2D2 Obinutuzumab-ICE. Rec'd IT MTX on 11/11. Tolerating treatment well 
11/14 Issues with PICC line yesterday leaking at site of insertion. Trial of cathflo with bleeding at site. PICC line pulled. Current port to be replaced on Monday per IR. Will hold treatment given lack of IV access (difficulty with IV access in past and PICC had to be placed per IR). Leukocytosis noted - likely reactive given PICC manipulation and recent steroids with obinutuzumab. 
11/15 No change - awaiting IR for port replacement tomorrow. Hx lupus/RA 
- on meloxicam, prednisone, and plaquenil prn Anemia secondary to chemotherapy - Transfuse prn per Manisha SOPs 
11/13 Check iron studies, B12, folate in AM 
11/14 Hgb 7.3 this morning - Fe studies pending. B12 pending.  Folate adequate. 11/15 Hgb stable at 7.3 - no DARBY; B12/folate adequate. Elevated LFTs 
11/13 Improving 11/14 Improving. Continue to monitor. Continue home meds Prophylactic Antibx:  Acyclovir Manisha SOPs Lovenox for DVT prophylaxis (hold for plt <50k) Goals and plan of care reviewed with the patient. All questions answered to the best of our ability. Disposition:  Anticipate discharge home after the completion of chemotherapy - currently on hold due to IV access issues. HUYEN Hilario [facility] Hematology & Oncology [address] [place] [address] Office : [phone] Fax : [phone]

## 2020-11-16 PROBLEM — E43 SEVERE PROTEIN-CALORIE MALNUTRITION (HCC): Chronic | Status: ACTIVE | Noted: 2020-01-01

## 2020-11-16 NOTE — PROGRESS NOTES
Care Management Interventions PCP Verified by CM: Yes(Dr. Erlin Diallo) Palliative Care Criteria Met (RRAT>21 & CHF Dx)?: No(Risk 10% Dx chemotherapy) Transition of Care Consult (CM Consult): Discharge Planning Discharge Durable Medical Equipment: No 
Physical Therapy Consult: No 
Occupational Therapy Consult: No 
Speech Therapy Consult: No 
Current Support Network: Lives with Spouse Confirm Follow Up Transport: Family Discharge Location Discharge Placement: Home with family assistance Per oncology notes: Anticipate discharge home after the completion of chemotherapy - currently on hold due to IV access issues. Patient lives with her spouse and independent of ADL's. Current d/c plan is home with spouse when medically stable.  CM following.

## 2020-11-16 NOTE — PROGRESS NOTES
[facility] Hematology & Oncology Inpatient Hematology / Oncology Progress Note Admission Date: 2020  1:43 PM 
Reason for Admission/Hospital Course: Admission for antineoplastic chemotherapy [Z51.11] 24 Hour Events: 
Afebrile, VSS 
C2D2  - interrupted due to access Port removal and replacement today in IR Feeling well, no overnight events Transfusions: None Replacements: None ROS: 
Constitutional: Negative for fever, chills, weakness, malaise, fatigue. CV: Negative for chest pain, palpitations, edema. Respiratory: Negative for dyspnea, cough, wheezing. GI: Negative for nausea, abdominal pain, diarrhea. 10 point review of systems is otherwise negative with the exception of the elements mentioned above in the HPI. No Known Allergies OBJECTIVE: 
Patient Vitals for the past 8 hrs: 
 BP Temp Pulse Resp SpO2 Weight 20 0715 106/70 98.2 °F (36.8 °C) (!) 108 18 99 %   
20 0315      189 lb 3 oz (85.8 kg)  
20 0314 110/63 98 °F (36.7 °C) 84 18 99 %  Temp (24hrs), Av.1 °F (36.7 °C), Min:98 °F (36.7 °C), Max:98.2 °F (36.8 °C) No intake/output data recorded. Physical Exam: 
Constitutional: Well developed, well nourished female in no acute distress, sitting comfortably in the hospital bed. HEENT: Normocephalic and atraumatic. Oropharynx is clear, mucous membranes are moist.  Extraocular muscles are intact. Sclerae anicteric. Neck supple without JVD. No thyromegaly present. Skin Warm and dry. No bruising and no rash noted. No erythema. No pallor. Skin discoloration noted on BLE, L>R. Respiratory Lungs are clear to auscultation bilaterally without wheezes, rales or rhonchi, normal air exchange without accessory muscle use. CVS Normal rate, regular rhythm and normal S1 and S2. No murmurs, gallops, or rubs. Abdomen Soft, nontender and nondistended, normoactive bowel sounds. No palpable mass. No hepatosplenomegaly. Neuro Grossly nonfocal with no obvious sensory or motor deficits. MSK Normal range of motion in general.  No edema and no tenderness. Psych Appropriate mood and affect. Labs: 
   
Recent Labs 11/16/20 
0451 11/15/20 
0315 11/14/20 
6435 WBC 8.3 13.4* 15.5*  
RBC 2.66* 2.78* 2.74* HGB 7.1* 7.3* 7.3* HCT 23.3* 23.9* 23.9*  
MCV 87.6 86.0 87.2 MCH 26.7 26.3 26.6 MCHC 30.5* 30.5* 30.5* RDW 21.6* 21.5* 21.1*  
 275 248 GRANS 68 81* 88* LYMPH 4* 2* 2*  
MONOS 21* 12 7 EOS  --  0* 0*  
BASOS  --  0 0 IG  --  5 3  
DF MANUAL AUTOMATED AUTOMATED ANEU 6.0 10.9* 13.7* ABL 0.3* 0.2* 0.3* ABM 1.7* 1.6* 1.2 TYE  --  0.0 0.0 ABB  --  0.0 0.0 AIG  --  0.7* 0.4 Recent Labs 11/16/20 
0451 11/15/20 
0315 11/14/20 
6015  143 143  
K 4.2 3.4* 3.9  108* 108* CO2 29 28 28 AGAP 5* 7 7 * 132* 121* BUN 7 11 8 CREA 0.52* 0.57* 0.58* GFRAA >60 >60 >60 GFRNA >60 >60 >60  
CA 9.0 9.0 9.1 * 661* 611* TP 6.2* 6.4 6.6 ALB 2.7* 2.9* 2.8*  
GLOB 3.5 3.5 3.8* AGRAT 0.8* 0.8* 0.7* MG 1.7* 1.7* 1.9 Imaging: n/a Medications: 
Current Facility-Administered Medications Medication Dose Route Frequency  potassium chloride (K-DUR, KLOR-CON) SR tablet 20 mEq  20 mEq Oral BID  magnesium oxide (MAG-OX) tablet 400 mg  400 mg Oral TID  influenza vaccine 2020-21 (6 mos+)(PF) (FLUARIX/FLULAVAL/FLUZONE QUAD) injection 0.5 mL  0.5 mL IntraMUSCular PRIOR TO DISCHARGE  
 0.9% sodium chloride infusion  25 mL/hr IntraVENous CONTINUOUS  
 ondansetron (ZOFRAN) injection 8 mg  8 mg IntraVENous Q8H  
 etoposide (VEPESID) 198 mg in 0.9% sodium chloride 500 mL chemo infusion  100 mg/m2 (Treatment Plan Recorded) IntraVENous Q24H  
 meperidine (DEMEROL) injection 25 mg  25 mg IntraVENous PRN  
 acyclovir (ZOVIRAX) capsule 400 mg  400 mg Oral BID  allopurinoL (ZYLOPRIM) tablet 300 mg  300 mg Oral BID  
  amLODIPine (NORVASC) tablet 10 mg  10 mg Oral DAILY  aspirin delayed-release tablet 81 mg  81 mg Oral DAILY  folic acid (FOLVITE) tablet 1 mg  1 mg Oral DAILY  lidocaine-prilocaine (EMLA) 2.5-2.5 % cream   Topical PRN  
 meloxicam (MOBIC) tablet 15 mg  15 mg Oral DAILY  pantoprazole (PROTONIX) tablet 40 mg  40 mg Oral DAILY  0.9% sodium chloride infusion  75 mL/hr IntraVENous CONTINUOUS  
 enoxaparin (LOVENOX) injection 40 mg  40 mg SubCUTAneous Q24H  
 ondansetron (ZOFRAN) injection 4 mg  4 mg IntraVENous Q4H PRN  prochlorperazine (COMPAZINE) with saline injection 10 mg  10 mg IntraVENous Q6H PRN  
 HYDROcodone-acetaminophen (NORCO) 5-325 mg per tablet 1 Tab  1 Tab Oral Q6H PRN  
 morphine injection 2 mg  2 mg IntraVENous Q4H PRN  
 
 
 
ASSESSMENT: 
 
Problem List  Date Reviewed: 11/5/2020 Codes Class Noted Severe protein-calorie malnutrition ([facility].) (Chronic) ICD-10-CM: K56 ICD-9-CM: 630  11/16/2020 Diffuse large B-cell lymphoma ([facility] ICD-10-CM: C83.30 ICD-9-CM: 202.80  10/29/2020 Admission for antineoplastic chemotherapy ICD-10-CM: Z51.11 ICD-9-CM: V58.11  10/22/2020 Diffuse large B cell lymphoma (HCC) ICD-10-CM: C83.30 ICD-9-CM: 202.80  10/22/2020 DLBCL (diffuse large B cell lymphoma) (HCC) ICD-10-CM: C83.30 ICD-9-CM: 202.80  10/21/2020  
   
  
 
48 female, , non-smoker, history of DM, hypertension, lupus/RA (Dr Becky Garrett, on meloxicam, prednisone and plaquenil prn), distant history of b/l LE DVTs (multiple yrs ago), tubal ligation, w/ relapsed/refractory DLBCL under care of Dr Vikash Mcfarland. She is s/p RCHOP x 8, initially responding disease however scans w/ disease progression after 8 cycles. Hepatitis panel negative.   HIV negative.  On allopurinol 300 twice daily.  Recent 2D ECHO with unremarkable EF.   CT CAP slightly worse from the last PET scan with significant disease involving liver and spleen (imaging personally reviewed). Abby Salomon initial outside pathology was internally read consistent with DLBCL, ABC type with CD30 positivity.  M-ore recent liver biopsy consistent with relapsed disease. Seen today in office 11/12/20: Since last visit has completed ObinutuzumabICE C1. Tolerated reasonably. Reasonable p.o. intake. Some constipation. Has also undergone IT therapy with CSF analysis which was negative. Denies any recent fevers or chills. Reports abdominal discomfort/bloating sensation is somewhat improved, possibly related to an underlying response.   Okay to proceed with cycle 2, will be hospitalized today for this. Continue IT MTX therapy w each cycle. Was unable to get port due to concern regarding cytopenias. S/p PICC line replacement, today notes  flushes well however withdrawal has been an issue. We will plan for port prior to next cycle. 
  
 
PLAN: 
Relapsed DLBCL 
- As above. Obinutuzumab-ICE. Udenyca support. OK to proceed w C2.  
- IT MTX prophylaxis x 5 - Allopurinol 300 twice daily - Repeat PET after 2 cycles. - Prophylaxis w Acyclovir. - Look into enrollment in [id number] with ibrutinib in association w ASCT. She will need to be on minimal steroids for this   
11/13 C2D2 Obinutuzumab-ICE. Rec'd IT MTX on 11/11. Tolerating treatment well 
11/14 Issues with PICC line yesterday leaking at site of insertion. Trial of cathflo with bleeding at site. PICC line pulled. Current port to be replaced on Monday per IR. Will hold treatment given lack of IV access (difficulty with IV access in past and PICC had to be placed per IR). Leukocytosis noted - likely reactive given PICC manipulation and recent steroids with obinutuzumab. 
11/15 No change - awaiting IR for port replacement tomorrow. 11/16 To IR for port placement today; plan to resume chemo once port placed and ready for use. Labs reviewed and acceptable to proceed when ready. Hx lupus/RA - on meloxicam, prednisone, and plaquenil prn Anemia secondary to chemotherapy - Transfuse prn per Manisha SOPs 
11/13 Check iron studies, B12, folate in AM 
11/14 Hgb 7.3 this morning - Fe studies pending. B12 pending. Folate adequate. 11/15 Hgb stable at 7.3 - no DARBY; B12/folate adequate. Elevated LFTs 
11/13 Improving 11/14 Improving. Continue to monitor. Continue home meds Prophylactic Antibx:  Acyclovir Manisha SOPs Lovenox for DVT prophylaxis (hold for plt <50k) Goals and plan of care reviewed with the patient. All questions answered to the best of our ability. Disposition:  Anticipate discharge home after the completion of chemotherapy - currently on hold due to IV access issues. HUYEN Boateng [facility] Hematology & Oncology [address] [place] [address] Office : [phone] Fax : [phone]

## 2020-11-16 NOTE — PROGRESS NOTES
Problem: Falls - Risk of 
Goal: *Absence of Falls Description: Document Bg Montes De Oca Fall Risk and appropriate interventions in the flowsheet. Outcome: Progressing Towards Goal 
Note: Fall Risk Interventions: 
  
 
  
 
Medication Interventions: Evaluate medications/consider consulting pharmacy Problem: Patient Education: Go to Patient Education Activity Goal: Patient/Family Education Outcome: Resolved/Met Problem: Nutrition Deficit Goal: *Optimize nutritional status Outcome: Progressing Towards Goal 
  
Problem: Patient Education: Go to Patient Education Activity Goal: Patient/Family Education Outcome: Resolved/Met Problem: Chemotherapy, Day 1 Goal: Activity/Safety Outcome: Progressing Towards Goal 
Goal: Consults, if ordered Outcome: Progressing Towards Goal 
Goal: Diagnostic Test/Procedures Outcome: Progressing Towards Goal 
Goal: Nutrition/Diet Outcome: Progressing Towards Goal 
Goal: Discharge Planning Outcome: Progressing Towards Goal 
Goal: Medications Outcome: Progressing Towards Goal 
Goal: Respiratory Outcome: Progressing Towards Goal 
Goal: Treatments/Interventions/Procedures Outcome: Progressing Towards Goal 
Goal: Psychosocial 
Outcome: Progressing Towards Goal 
Goal: *Optimal pain control at patient's stated goal 
Outcome: Progressing Towards Goal 
Goal: *Hemodynamically stable Outcome: Progressing Towards Goal 
Goal: *Adequate oxygenation Outcome: Progressing Towards Goal 
Goal: *Chemotherapy regimen is initiated Outcome: Progressing Towards Goal 
Goal: *Patient and family verbalize understanding of plan of care Outcome: Progressing Towards Goal

## 2020-11-16 NOTE — ADT AUTH CERT NOTES
Chemotherapy - Care Day 5 (11/16/2020) by Yandel Oseguera RN  
 
   
Review Status  Review Entered Completed  11/16/2020 14:39   
   
Criteria Review Care Day: 5 Care Date: 11/16/2020 Level of Care: Inpatient Floor Guideline Day 3 Clinical Status   
(X) * Hemodynamic stability 11/16/2020 14:39:49 EST by Britni Gonzales   
  no mention of instability in MD notes - No vitals documented since admission   
( ) * Infection absent or under adequate treatment   
(X) * Electrolyte status acceptable   
(X) * Pain absent or managed 11/16/2020 14:39:49 EST by Pop Johnson no c/o pain   
(X) * Renal function normal, at baseline, or acceptable for next level of care 11/16/2020 14:39:49 EST by Britni Gonzales   
  bun 7 crt  0.52 (X) * Oral mucositis and candidiasis absent or acceptable for next level of care   
(X) * Mental status at baseline ( ) * No evidence of cardiac toxicity ( ) * Afebrile or temperature acceptable for next level of care ( ) * Nausea and vomiting absent or controlled ( ) * Blood counts normal or acceptable for next level of care ( ) * Discharge plans and education understood Activity   
(X) * Ambulatory or acceptable for next level of care Routes   
(X) * Oral hydration, medications, and diet [D]   
11/16/2020 14:39:49 EST by Pop Johnson regular diet Zovirax 400 mg po bid, lovenox 40 mg sq qd, Zofran 8 mg iV q8h, protonix 40 mg po qd, KCL 20 meq po bid,   
(X) IV medications 11/16/2020 14:39:49 EST by Britni Gonzales   
  Vepesid 198 mg IV qd, Emend 150 mg IV once,   
(X) Diet as tolerated Interventions (X) CBC and chemistries 11/16/2020 14:39:49 EST by Britni Gonzales   
  Rbc  2.66, hgb  7.1 hct 23.3, lymph 4, mono 21, abs nrbc  0.38, abs lymph  0.3, abs mono  1.7, anion gap 5, bun 7 crt  0.52, mg 1.7, total prot  6.2, alb 2.7, a-g ratio 0.8, AST 56, alk phos 637 Medications (X) Chemotherapy as indicated (X) Possible antiemetics 11/16/2020 14:39:49 EST by Rossy Maldonado   
  zofran 8 mg IV q8h   
(X) Possible anticoagulants 11/16/2020 14:39:49 EST by Rossy Maldonado   
  lovenox 40 mg sq qd * Milestone Additional Notes IP Oncology  no vitals listed IR consult insert Tunnel cath, NPO Zovirax 400 mg po bid,   
IR Note - Uncomplicated right internal jugular vein tunneled power injectable   
chest port placement and removal of malfunctioning left chest port.  Plan: The patient will recover for 1 hour. The chest port is ready for use. Vascular Note - Pre-Procedure Diagnosis: lymphoma, chest port malfunction   
    
Post-Procedure Diagnosis: SAME   
    
Procedure(s): Venous Chest Port Placement and removal   
Implants:  Chest Port Placement   
    
Findings: catheter tip in right atrium. Well healed chest port incision   
    
Complications: None   
    
Recommendations: ok to use port HEME/ONC Note - 24 Hour Events:   
Afebrile, VSS   
C2D2 11/13 - interrupted due to access Port removal and replacement today in IR Feeling well, no overnight events PLAN:   
Relapsed DLBCL   
- As above. Obinutuzumab-ICE. Udenyca support. OK to proceed w C2.   
- IT MTX prophylaxis x 5 - Allopurinol 300 twice daily - Repeat PET after 2 cycles. - Prophylaxis w Acyclovir. - Look into enrollment in [id number] with ibrutinib in association w ASCT. She will need to be on minimal steroids for this     
11/13 C2D2 Obinutuzumab-ICE.  Rec'd IT MTX on 11/11.  Tolerating treatment well   
11/14 Issues with PICC line yesterday leaking at site of insertion. Trial of cathflo with bleeding at site. PICC line pulled. Current port to be replaced on Monday per IR. Will hold treatment given lack of IV access (difficulty with IV access in past and PICC had to be placed per IR). Leukocytosis noted - likely reactive given PICC manipulation and recent steroids with obinutuzumab. 11/15 No change - awaiting IR for port replacement tomorrow. 11/16 To IR for port placement today; plan to resume chemo once port placed and ready for use. Labs reviewed and acceptable to proceed when ready.   
    
Hx lupus/RA   
- on meloxicam, prednisone, and plaquenil prn   
    
Anemia secondary to chemotherapy - Transfuse prn per Manisha SOPs   
11/13 Check iron studies, B12, folate in AM   
11/14 Hgb 7.3 this morning - Fe studies pending. B12 pending. Folate adequate. 11/15 Hgb stable at 7.3 - no DARBY; B12/folate adequate.   
    
Elevated LFTs   
11/13 Improving 11/14 Improving. Continue to monitor.   
    
Continue home meds Prophylactic Antibx:  Acyclovir Manisha SOPs Lovenox for DVT prophylaxis (hold for plt <50k)   
    
Goals and plan of care reviewed with the patient. Doris Fagan questions answered to the best of our ability.   
    
Disposition:  Anticipate discharge home after the completion of chemotherapy - currently on hold due to IV access issues.

## 2020-11-16 NOTE — PROCEDURES
Department of Interventional Radiology  [phone]        Interventional Radiology Brief Procedure Note    Patient: Iggy Beckman MRN: [id number]  SSN: [id number]    YOB: 1970  Age: 48 y.o. Sex: female      Date of Procedure: 11/16/2020    Pre-Procedure Diagnosis: lymphoma, chest port malfunction    Post-Procedure Diagnosis: SAME    Procedure(s): Venous Chest Port Placement and removal    Brief Description of Procedure: as above    Performed By: Susan Fowler PA-C     Assistants: None    Anesthesia:Moderate Sedation per Murray Verduzco MD    Estimated Blood Loss: Less than 10ml    Specimens:  None    Implants:  Chest Port Placement    Findings: catheter tip in right atrium.  Well healed chest port incision    Complications: None    Recommendations: ok to use port     Follow Up: prn    Signed By: Susan Fowler PA-C     November 16, 2020

## 2020-11-16 NOTE — H&P
Department of Interventional Radiology  [phone]    History and Physical    Patient:  Vicente Vogt MRN:  [id number]  SSN:  [id number]    YOB: 1970  Age:  48 y.o. Sex:  female      Primary Care Provider:  Néstor Dominique MD  Referring Physician:  Toyin Mackenzie NP    Subjective:     Chief Complaint: port malfunction    History of the Present Illness: The patient is a 48 y.o. female with lymphoma who presents for replacement of her malfunctioning, malpositioned left chest port. PICC had to be removed due to bleeding around the catheter. Access issues. NPO. Past Medical History:   Diagnosis Date    Cancer ([facility])     non hodgkins lymphoma    Contact dermatitis and eczema due to cause     Lupus ([facility])     Rheumatic arteritis      Past Surgical History:   Procedure Laterality Date    HX COLONOSCOPY      IR INTRATHECAL CHEMO INJECTION CNS  11/11/2020    IR SPINAL PUNCTURE CSF TREAT / DRAIN  10/23/2020        Review of Systems:    Pertinent items are noted in the History of Present Illness. Prior to Admission medications    Medication Sig Start Date End Date Taking? Authorizing Provider   allopurinoL (ZYLOPRIM) 300 mg tablet Take 1 Tab by mouth two (2) times a day. 11/13/20   Toyin Mackenzie NP   pantoprazole (PROTONIX) 40 mg tablet Take 1 Tab by mouth daily. 11/6/20   Jennifer Armenta NP   ondansetron hcl (ZOFRAN) 8 mg tablet Take 1 Tab by mouth every eight (8) hours as needed for Nausea or Vomiting. 11/4/20   HUYEN Louis   acyclovir (ZOVIRAX) 200 mg capsule Take 2 Caps by mouth two (2) times a day for 30 days. 10/26/20 11/25/20  HUYEN Louis   meloxicam (MOBIC) 15 mg tablet meloxicam 15 mg tablet   1 tablet daily 12/28/19   Provider, Historical   triamcinolone acetonide (KENALOG) 0.1 % topical cream triamcinolone acetonide 0.1 % topical cream   apply to affected area twice daily 2 week on/ 2 weeks off as needed.  1/22/20   Provider, Historical   amLODIPine (Norvasc) 10 mg tablet Norvasc 10 mg tablet   Norvasc 10MG, 1 (one) Tablet Tablet daily # 30, 03/07/2014, Ref. x3. Active 11/30/19   Provider, Historical   aspirin delayed-release 81 mg tablet Take 81 mg by mouth daily. Provider, Historical   calcipotriene (DOVONEX) 0.005 % topical cream calcipotriene 0.005 % topical cream   1 application topicall 2 times a day to legs 12/28/19   Provider, Historical   clobetasoL (TEMOVATE) 0.05 % ointment clobetasol 0.05 % topical ointment   apply twice daily for 2 weeks on and 2 weeks off 6/30/20   Provider, Historical   folic acid (FOLVITE) 1 mg tablet folic acid 1 mg tablet   1 tablet daily    Provider, Historical   hydrOXYchloroQUINE (PLAQUENIL) 200 mg tablet hydroxychloroquine 200 mg tablet 3/3/20   Provider, Historical   ketoconazole (NIZORAL) 2 % topical cream ketoconazole 2 % topical cream   1 application topically 2 times a day as needed for rash 1/22/20   Provider, Historical   lidocaine-prilocaine (EMLA) topical cream Apply  to affected area as needed. 3/27/20   Provider, Historical   promethazine (PHENERGAN) 12.5 mg tablet promethazine 12.5 mg tablet   1 tablet by mouth every 8 hours as needed for nausea and vomiting.  3/2/20   Provider, Historical   predniSONE (DELTASONE) 50 mg tablet prednisone 50 mg tablet   take 2 tablets by mouth daily with breakfast 8/21/20   Provider, Historical        No Known Allergies    Family History   Problem Relation Age of Onset    No Known Problems Mother     Diabetes Father     Heart Disease Father     No Known Problems Brother     No Known Problems Brother      Social History     Tobacco Use    Smoking status: Never Smoker    Smokeless tobacco: Never Used   Substance Use Topics    Alcohol use: Not Currently     Frequency: Never        Objective:       Physical Examination:    Vitals:    11/16/20 0912   BP: 119/80   Pulse: 100   Resp: 18   Temp: 98 °F (36.7 °C)       Pain Assessment  Pain Intensity 1: 0 (11/16/20 0912) HEART: regular rate and rhythm  LUNG: clear to auscultation bilaterally  ABDOMEN: normal findings: soft, non-tender  EXTREMITIES: normal strength, tone, and muscle mass    Laboratory:     Lab Results   Component Value Date/Time    Sodium 141 11/16/2020 04:51 AM    Sodium 143 11/15/2020 03:15 AM    Potassium 4.2 11/16/2020 04:51 AM    Potassium 3.4 (L) 11/15/2020 03:15 AM    Chloride 107 11/16/2020 04:51 AM    Chloride 108 (H) 11/15/2020 03:15 AM    CO2 29 11/16/2020 04:51 AM    CO2 28 11/15/2020 03:15 AM    Anion gap 5 (L) 11/16/2020 04:51 AM    Anion gap 7 11/15/2020 03:15 AM    Glucose 117 (H) 11/16/2020 04:51 AM    Glucose 132 (H) 11/15/2020 03:15 AM    BUN 7 11/16/2020 04:51 AM    BUN 11 11/15/2020 03:15 AM    Creatinine 0.52 (L) 11/16/2020 04:51 AM    Creatinine 0.57 (L) 11/15/2020 03:15 AM    GFR est AA >60 11/16/2020 04:51 AM    GFR est AA >60 11/15/2020 03:15 AM    GFR est non-AA >60 11/16/2020 04:51 AM    GFR est non-AA >60 11/15/2020 03:15 AM    Calcium 9.0 11/16/2020 04:51 AM    Calcium 9.0 11/15/2020 03:15 AM    Magnesium 1.7 (L) 11/16/2020 04:51 AM    Magnesium 1.7 (L) 11/15/2020 03:15 AM    Albumin 2.7 (L) 11/16/2020 04:51 AM    Albumin 2.9 (L) 11/15/2020 03:15 AM    Protein, total 6.2 (L) 11/16/2020 04:51 AM    Protein, total 6.4 11/15/2020 03:15 AM    Globulin 3.5 11/16/2020 04:51 AM    Globulin 3.5 11/15/2020 03:15 AM    A-G Ratio 0.8 (L) 11/16/2020 04:51 AM    A-G Ratio 0.8 (L) 11/15/2020 03:15 AM    ALT (SGPT) 41 11/16/2020 04:51 AM    ALT (SGPT) 41 11/15/2020 03:15 AM     Lab Results   Component Value Date/Time    WBC 8.3 11/16/2020 04:51 AM    WBC 13.4 (H) 11/15/2020 03:15 AM    HGB 7.1 (L) 11/16/2020 04:51 AM    HGB 7.3 (L) 11/15/2020 03:15 AM    HCT 23.3 (L) 11/16/2020 04:51 AM    HCT 23.9 (L) 11/15/2020 03:15 AM    PLATELET 500 84/56/2859 04:51 AM    PLATELET 475 70/36/6152 03:15 AM     No results found for: APTT, PTP, INR, INREXT    Assessment:     Lymphoma, malfunctioning chest port    Hospital Problems  Date Reviewed: 11/5/2020    None          Plan:     Planned Procedure:  Chest port replacement    Risks, benefits, and alternatives reviewed with patient and she agrees to proceed with the procedure.       Signed By: Noris Myers PA-C     November 16, 2020

## 2020-11-17 NOTE — PROGRESS NOTES
Problem: Falls - Risk of 
Goal: *Absence of Falls Description: Document Jacob Click Fall Risk and appropriate interventions in the flowsheet. Outcome: Progressing Towards Goal 
Note: Fall Risk Interventions: 
  
 
  
 
Medication Interventions: Teach patient to arise slowly Problem: Nutrition Deficit Goal: *Optimize nutritional status Outcome: Progressing Towards Goal 
  
Problem: Chemotherapy, Day 1 Goal: *Hemodynamically stable Outcome: Resolved/Met Problem: Chemotherapy, Day 3 to Discharge Goal: Off Pathway (Use only if patient is Off Pathway) Outcome: Progressing Towards Goal 
Goal: Activity/Safety Outcome: Progressing Towards Goal 
Goal: Consults, if ordered Outcome: Progressing Towards Goal 
Goal: Diagnostic Test/Procedures Outcome: Progressing Towards Goal 
Goal: Nutrition/Diet Outcome: Progressing Towards Goal 
Goal: Discharge Planning Outcome: Progressing Towards Goal 
Goal: Medications Outcome: Progressing Towards Goal 
Goal: Respiratory Outcome: Progressing Towards Goal 
Goal: Treatments/Interventions/Procedures Outcome: Progressing Towards Goal 
Goal: Psychosocial 
Outcome: Progressing Towards Goal 
Goal: *Optimal pain control at patient's stated goal 
Outcome: Progressing Towards Goal 
Goal: *Hemodynamically stable Outcome: Progressing Towards Goal 
Goal: *Adequate oxygenation Outcome: Progressing Towards Goal 
  
Problem: Anemia Care Plan (Adult and Pediatric) Goal: *Labs within defined limits Outcome: Progressing Towards Goal 
Goal: *Tolerates increased activity Outcome: Progressing Towards Goal 
  
Problem: Patient Education: Go to Patient Education Activity Goal: Patient/Family Education Outcome: Resolved/Met

## 2020-11-17 NOTE — PROGRESS NOTES
END OF SHIFT NOTE: 
 
Intake/Output 11/16 1901 - 11/17 0700 In: 1008 [P.O.:360; I.V.:648] Out: 700 [Urine:700] Voiding: YES Catheter: NO 
Drain:   
 
 
 
 
 
Stool:  0 occurrences. Emesis:  0 occurrences. VITAL SIGNS Patient Vitals for the past 12 hrs: 
 Temp Pulse Resp BP SpO2  
11/17/20 0230 98.3 °F (36.8 °C) 71 16 100/65 96 % 11/16/20 2239 98.3 °F (36.8 °C) 69 16 103/76 99 % 11/16/20 1906 98.2 °F (36.8 °C) 98 16 105/80 100 % Pain Assessment Pain 1 Pain Scale 1: Numeric (0 - 10) (11/17/20 0100) Pain Intensity 1: 0 (11/17/20 0100) Patient Stated Pain Goal: 0 (11/17/20 0100) Pain Reassessment 1: Yes (11/15/20 1550) Ambulating Yes Additional Information: No new complaints overnight. To continue Day 2 of ICE  chemo today. Shift report given to oncoming nurse Sharon RN at the bedside.  
 
Augustin Pichardo RN

## 2020-11-17 NOTE — PROGRESS NOTES
[facility] Inpatient Hematology / Oncology Progress Note Admission Date: 2020  1:43 PM 
Reason for Admission/Hospital Course: Admission for antineoplastic chemotherapy [Z51.11] Admission for antineoplastic chemotherapy [Z51.11] 24 Hour Events: 
Afebrile, VSS Port replacement yesterday Obinutuzumab  - issues with access D2 ICE today Feeling well, no overnight events Transfusions: None Replacements: None ROS: 
Constitutional: Negative for fever, chills, weakness, malaise, fatigue. CV: Negative for chest pain, palpitations, edema. Respiratory: Negative for dyspnea, cough, wheezing. GI: Negative for nausea, abdominal pain, diarrhea. 10 point review of systems is otherwise negative with the exception of the elements mentioned above in the HPI. No Known Allergies OBJECTIVE: 
Patient Vitals for the past 8 hrs: 
 BP Temp Pulse Resp SpO2 Weight 20 0745 111/77 97.8 °F (36.6 °C) 80 20 99 %   
20 0230 100/65 98.3 °F (36.8 °C) 71 16 96 %   
20 0042      190 lb 7 oz (86.4 kg) Temp (24hrs), Av.9 °F (36.6 °C), Min:97.4 °F (36.3 °C), Max:98.3 °F (36.8 °C) No intake/output data recorded. Physical Exam: 
Constitutional: Well developed, well nourished female in no acute distress, sitting comfortably in the hospital bed. HEENT: Normocephalic and atraumatic. Oropharynx is clear, mucous membranes are moist.  Extraocular muscles are intact. Sclerae anicteric. Neck supple without JVD. No thyromegaly present. Skin Warm and dry. No bruising and no rash noted. No erythema. No pallor. Skin discoloration noted on BLE, L>R. Respiratory Lungs are clear to auscultation bilaterally without wheezes, rales or rhonchi, normal air exchange without accessory muscle use. CVS Normal rate, regular rhythm and normal S1 and S2. No murmurs, gallops, or rubs. Abdomen Soft, nontender and nondistended, normoactive bowel sounds. No palpable mass. No hepatosplenomegaly. Neuro Grossly nonfocal with no obvious sensory or motor deficits. MSK Normal range of motion in general.  No edema and no tenderness. Psych Appropriate mood and affect. Labs: 
   
Recent Labs 11/17/20 
0794 11/16/20 
0451 11/15/20 
0315 WBC 6.3 8.3 13.4*  
RBC 2.70* 2.66* 2.78* HGB 7.3* 7.1* 7.3* HCT 23.9* 23.3* 23.9*  
MCV 88.5 87.6 86.0 MCH 27.0 26.7 26.3 MCHC 30.5* 30.5* 30.5* RDW 22.2* 21.6* 21.5*  
 274 275 GRANS 95* 68 81* LYMPH 2* 4* 2*  
MONOS 2* 21* 12 EOS 0*  --  0* BASOS 0  --  0 IG 2  --  5  
DF AUTOMATED MANUAL AUTOMATED ANEU 5.9 6.0 10.9* ABL 0.1* 0.3* 0.2* ABM 0.1 1.7* 1.6* TYE 0.0  --  0.0 ABB 0.0  --  0.0 AIG 0.1  --  0.7* Recent Labs 11/17/20 
3873 11/16/20 
0451 11/15/20 
0315  141 143  
K 4.6 4.2 3.4*  
* 107 108* CO2 26 29 28 AGAP 7 5* 7 * 117* 132* BUN 10 7 11 CREA 0.60 0.52* 0.57* GFRAA >60 >60 >60 GFRNA >60 >60 >60  
CA 8.8 9.0 9.0 * 637* 661* TP 6.4 6.2* 6.4 ALB 2.8* 2.7* 2.9*  
GLOB 3.6* 3.5 3.5 AGRAT 0.8* 0.8* 0.8* MG 1.9 1.7* 1.7* Imaging: n/a Medications: 
Current Facility-Administered Medications Medication Dose Route Frequency  acyclovir (ZOVIRAX) capsule 400 mg  400 mg Oral BID  allopurinoL (ZYLOPRIM) tablet 300 mg  300 mg Oral BID  amLODIPine (NORVASC) tablet 10 mg  10 mg Oral DAILY  aspirin delayed-release tablet 81 mg  81 mg Oral DAILY  enoxaparin (LOVENOX) injection 40 mg  40 mg SubCUTAneous B91P  
 folic acid (FOLVITE) tablet 1 mg  1 mg Oral DAILY  influenza vaccine 2020-21 (6 mos+)(PF) (FLUARIX/FLULAVAL/FLUZONE QUAD) injection 0.5 mL  0.5 mL IntraMUSCular PRIOR TO DISCHARGE  magnesium oxide (MAG-OX) tablet 400 mg  400 mg Oral TID  meloxicam (MOBIC) tablet 15 mg  15 mg Oral DAILY  pantoprazole (PROTONIX) tablet 40 mg  40 mg Oral DAILY  potassium chloride (K-DUR, KLOR-CON) SR tablet 20 mEq  20 mEq Oral BID  
 0.9% sodium chloride infusion  75 mL/hr IntraVENous CONTINUOUS  
 HYDROcodone-acetaminophen (NORCO) 5-325 mg per tablet 1 Tab  1 Tab Oral Q6H PRN  
 lidocaine-prilocaine (EMLA) 2.5-2.5 % cream   Topical PRN  
 meperidine (DEMEROL) injection 25 mg  25 mg IntraVENous PRN  
 morphine injection 2 mg  2 mg IntraVENous Q4H PRN  
 ondansetron (ZOFRAN) injection 4 mg  4 mg IntraVENous Q4H PRN  prochlorperazine (COMPAZINE) with saline injection 10 mg  10 mg IntraVENous Q6H PRN  
 etoposide (VEPESID) 198 mg in 0.9% sodium chloride 500 mL chemo infusion  100 mg/m2 (Treatment Plan Recorded) IntraVENous Q24H  
 ondansetron (ZOFRAN) injection 8 mg  8 mg IntraVENous Q8H  
 central line flush (saline) syringe 10 mL  10 mL InterCATHeter PRN  
 heparin (porcine) pf 300 Units  300 Units InterCATHeter PRN Facility-Administered Medications Ordered in Other Encounters Medication Dose Route Frequency  heparin (porcine) 100 unit/mL injection 500 Units  500 Units InterCATHeter PRN  
 diphenhydrAMINE (BENADRYL) injection 50 mg  50 mg IntraVENous Multiple  midazolam (VERSED) injection 0.5-2 mg  0.5-2 mg IntraVENous Multiple  fentaNYL citrate (PF) injection 25-50 mcg  25-50 mcg IntraVENous Multiple ASSESSMENT: 
 
Problem List  Date Reviewed: 11/5/2020 Codes Class Noted Severe protein-calorie malnutrition ([facility] [address] (Chronic) ICD-10-CM: O83 ICD-9-CM: 280  11/16/2020 Diffuse large B-cell lymphoma ([facility] [address] ICD-10-CM: C83.30 ICD-9-CM: 202.80  10/29/2020 Admission for antineoplastic chemotherapy ICD-10-CM: Z51.11 ICD-9-CM: V58.11  10/22/2020 Diffuse large B cell lymphoma (HCC) ICD-10-CM: C83.30 ICD-9-CM: 202.80  10/22/2020 DLBCL (diffuse large B cell lymphoma) (HCC) ICD-10-CM: C83.30 ICD-9-CM: 202.80  10/21/2020 48 female, , non-smoker, history of DM, hypertension, lupus/RA (Dr Elsie White, on meloxicam, prednisone and plaquenil prn), distant history of b/l LE DVTs (multiple yrs ago), tubal ligation, w/ relapsed/refractory DLBCL under care of Dr Melvina White. She is s/p RCHOP x 8, initially responding disease however scans w/ disease progression after 8 cycles. Hepatitis panel negative.  HIV negative.  On allopurinol 300 twice daily.  Recent 2D ECHO with unremarkable EF.   CT CAP slightly worse from the last PET scan with significant disease involving liver and spleen (imaging personally reviewed).  Her initial outside pathology was internally read consistent with DLBCL, ABC type with CD30 positivity.  M-ore recent liver biopsy consistent with relapsed disease. Seen today in office 11/12/20: Since last visit has completed ObinutuzumabICE C1. Tolerated reasonably. Reasonable p.o. intake. Some constipation. Has also undergone IT therapy with CSF analysis which was negative. Denies any recent fevers or chills. Reports abdominal discomfort/bloating sensation is somewhat improved, possibly related to an underlying response.   Okay to proceed with cycle 2, will be hospitalized today for this. Continue IT MTX therapy w each cycle. Was unable to get port due to concern regarding cytopenias. S/p PICC line replacement, today notes  flushes well however withdrawal has been an issue. We will plan for port prior to next cycle. 
  
 
PLAN: 
Relapsed DLBCL 
- As above. Obinutuzumab-ICE. Udenyca support. OK to proceed w C2.  
- IT MTX prophylaxis x 5 - Allopurinol 300 twice daily - Repeat PET after 2 cycles. - Prophylaxis w Acyclovir. - Look into enrollment in [id number] with ibrutinib in association w ASCT. She will need to be on minimal steroids for this   
11/13 C2D2 Obinutuzumab-ICE. Rec'd IT MTX on 11/11. Tolerating treatment well 
11/14 Issues with PICC line yesterday leaking at site of insertion.  Trial of cathflo with bleeding at site. PICC line pulled. Current port to be replaced on Monday per IR. Will hold treatment given lack of IV access (difficulty with IV access in past and PICC had to be placed per IR). Leukocytosis noted - likely reactive given PICC manipulation and recent steroids with obinutuzumab. 
11/15 No change - awaiting IR for port replacement tomorrow. 11/16 To IR for port placement today; plan to resume chemo once port placed and ready for use. Labs reviewed and acceptable to proceed when ready. 11/17 D2 ICE - tolerated etoposide yesterday. Hx lupus/RA 
- on meloxicam, prednisone, and plaquenil prn Anemia secondary to chemotherapy - Transfuse prn per Manisha SOPs 
11/13 Check iron studies, B12, folate in AM 
11/14 Hgb 7.3 this morning - Fe studies pending. B12 pending. Folate adequate. 11/15 Hgb stable at 7.3 - no DARBY; B12/folate adequate. Elevated LFTs 
11/13 Improving 11/14 Improving. Continue to monitor. Continue home meds Prophylactic Antibx:  Acyclovir Manisha SOPs Lovenox for DVT prophylaxis (hold for plt <50k) Goals and plan of care reviewed with the patient. All questions answered to the best of our ability. Disposition:  Anticipate discharge home after the completion of chemotherapy - scheduled to complete 11/19 PM. Will need G-CSF on discharge and new follow-up appts. E-mail sent to evon. HUYEN Sawant [facility] [id number] [address] Office : [phone] Fax : [phone]

## 2020-11-17 NOTE — PROGRESS NOTES
Comprehensive Nutrition Assessment Type and Reason for Visit: Cynthia Median Malnutrition Assessment: 
Malnutrition Status: Severe malnutrition Context: Chronic illness Findings of clinical characteristics of malnutrition:  
Energy Intake:  7 - 75% or less est energy requirements for 1 month or longer Weight Loss:  7.000 - Greater than 20% over 1 year Nutrition Assesment:  
Nutrition History:      11-13:Pt reports she notice her appetite and intake started to decline 1 year ago when it was thought she had a UTI, and her intake dropped to ~50%. She was diagnosed with lymphoma in February and had lost from 276# to ~246#. She started chemo and had some decreased tastes and mouth sores which both resolved. However her intake has remained at ~50% of her usual. Typical intake at home is dry cereal for breakfast, a light lunch of soup and then a full meal of meat, starch and vegetables for dinner. Her weight loss has continued with her last weight being 177# at cancer clinic yesterday. She declines offer of nutritional supplement and would like to focus on meal intake. She ate [employer] toast, eggs,wren, fruit cup, and juice for breakfast and chicken noodle soup, sherbet and Pepsi for lunch today. Nutrition Background: H/O: DM, HTN, lupus/RA, distant history of b/l LE DVTs. Admitted for chemo therapy for relapsed/refractory DLBC. Plan is for 5 IT chemos all together and complete 4 cycles of her therapy then possibly do a stem cell transplant. Interval HX: 
Pt reports appetite is good and she is eating 100% of all meals. She sometimes will save something from a meal try to eat as a snack like a rice krispie treat. Current Nutrition Therapies: DIET REGULAR Current Intake: Average Meal Intake: % Average Supplement Intake: None ordered Based on items ordered items, pt is meeting ~100% of kcal and ~% of protein needs Anthropometric Measures: 
Height: 5' 4\" (162.6 cm) Current Body Wt: 86.4 kg (190 lb 7.6 oz), Weight source: Bed scale No noted edema BMI: 32.7, Obese class 1 (BMI 30.0-34.9) UBW: Weight hx per EMR ( Based on [facility] care functionality, RD cannot know if these weight are actual versus stated): 
Weight and BMI Weight 11/17/2020 86.382 kg  
11/16/2020 85.815 kg  
11/14/2020 85.957 kg  
11/13/2020 85.911 kg  
11/12/2020 83.3 kg  
11/12/2020 83.28 kg  
11/12/2020 80.423 kg  
11/11/2020 80.287 kg  
11/5/2020 81.874 kg  
11/2/2020 86.183 kg  
10/29/2020 86.183 kg  
10/25/2020 89.086 kg  
10/25/2020 90.447 kg  
10/24/2020 85.3 kg  
10/23/2020 85.186 kg  
10/23/2020 86.183 kg  
10/22/2020 86.365 kg  
10/22/2020 85.049 kg  
10/13/2020 88.451 kg  
10/7/2020 89.54 kg  
 3.5 % change ( 197# to 190#) in wt within < 2 months c/w mild change. 31% change in 1 year (276# to 190#) in 1 year c/w severe change Estimated Daily Nutrient Needs: 
Energy (kcal): 1920-2452 kcal/d (20-25 kcal/kg)-severe wt loss (Kcal/kg, Weight Used: Current(Standing scale 83.3 kg  on 11-)) Protein (g):  grams/d (20% of kcal) Nutrition Diagnosis: · Severe malnutrition, In context of chronic illness related to (increased metabolic needs(cancer), intake < needs) as evidenced by (weight loss as above, intake <75% of needs for > 1 month) Nutrition Interventions:  
Food and/or Nutrient Delivery: Continue current diet Goals:   
Weight loss <5 # within 1 month Nutrition Monitoring and Evaluation:  
  
Food/Nutrient Intake Outcomes: Food and nutrient intake Physical Signs/Symptoms Outcomes: Weight Discharge Planning:   
Continue current diet Robyn Bull, 66 [address], [facility] [address] [place] [address]

## 2020-11-18 NOTE — PROGRESS NOTES
END OF SHIFT NOTE: 
 
Intake/Output 11/17 1901 - 11/18 0700 In: 4511 [P.O.:480; I.V.:1169] Out: 3250 [JTYNT:7070] Voiding: YES Catheter: NO 
Drain:   
 
 
 
 
 
Stool:  0 occurrences. Emesis:  0 occurrences. VITAL SIGNS Patient Vitals for the past 12 hrs: 
 Temp Pulse Resp BP SpO2  
11/18/20 0236 98 °F (36.7 °C) 73 18 102/76 100 % 11/17/20 2225 97.9 °F (36.6 °C) 81 20 95/74 100 % 11/17/20 1926 97.8 °F (36.6 °C) 97 18 120/87 100 % Pain Assessment Pain 1 Pain Scale 1: Numeric (0 - 10) (11/18/20 0110) Pain Intensity 1: 0 (11/18/20 0110) Patient Stated Pain Goal: 0 (11/18/20 0110) Pain Reassessment 1: Yes (11/15/20 1550) Ambulating Yes Additional Information:  
Continues on 24 hour chemo Ifex/[place];tolerating well. For Day 3 of Etoposide today. For 1 unit PRBC;hgb 6.8;per SOP. Pt updated on POC. Shift report given to oncoming nurse Sharon RN at the bedside.  
 
Asael Hilton RN

## 2020-11-18 NOTE — DISCHARGE INSTRUCTIONS
DISCHARGE SUMMARY from Nurse    PATIENT INSTRUCTIONS:    After general anesthesia or intravenous sedation, for 24 hours or while taking prescription Narcotics:  · Limit your activities  · Do not drive and operate hazardous machinery  · Do not make important personal or business decisions  · Do  not drink alcoholic beverages  · If you have not urinated within 8 hours after discharge, please contact your surgeon on call. Report the following to your surgeon:  · Excessive pain, swelling, redness or odor of or around the surgical area  · Temperature over 100.5  · Nausea and vomiting lasting longer than 4 hours or if unable to take medications  · Any signs of decreased circulation or nerve impairment to extremity: change in color, persistent  numbness, tingling, coldness or increase pain  · Any questions    What to do at Home:  Recommended activity: Activity as tolerated    If you experience any of the following symptoms fever, pain/nausea/vomiting not relieved by home medications, or any questions or concerns, please follow up with Dr. Graeme Pond. *  Please give a list of your current medications to your Primary Care Provider. *  Please update this list whenever your medications are discontinued, doses are      changed, or new medications (including over-the-counter products) are added. *  Please carry medication information at all times in case of emergency situations. These are general instructions for a healthy lifestyle:    No smoking/ No tobacco products/ Avoid exposure to second hand smoke  Surgeon General's Warning:  Quitting smoking now greatly reduces serious risk to your health.     Obesity, smoking, and sedentary lifestyle greatly increases your risk for illness    A healthy diet, regular physical exercise & weight monitoring are important for maintaining a healthy lifestyle    You may be retaining fluid if you have a history of heart failure or if you experience any of the following symptoms: Weight gain of 3 pounds or more overnight or 5 pounds in a week, increased swelling in our hands or feet or shortness of breath while lying flat in bed. Please call your doctor as soon as you notice any of these symptoms; do not wait until your next office visit. The discharge information has been reviewed with the patient. The patient verbalized understanding. Discharge medications reviewed with the patient and appropriate educational materials and side effects teaching were provided.   ___________________________________________________________________________________________________________________________________

## 2020-11-18 NOTE — PROGRESS NOTES
Problem: Falls - Risk of 
Goal: *Absence of Falls Description: Document Crista Szymanski Fall Risk and appropriate interventions in the flowsheet. Outcome: Resolved/Met Problem: Nutrition Deficit Goal: *Optimize nutritional status Outcome: Resolved/Met Problem: Chemotherapy, Day 3 to Discharge Goal: Off Pathway (Use only if patient is Off Pathway) Outcome: Resolved/Met Goal: Activity/Safety Outcome: Resolved/Met Goal: Consults, if ordered Outcome: Resolved/Met Goal: Diagnostic Test/Procedures Outcome: Resolved/Met Goal: Nutrition/Diet Outcome: Resolved/Met Goal: Discharge Planning Outcome: Resolved/Met Goal: Medications Outcome: Resolved/Met Goal: Respiratory Outcome: Resolved/Met Goal: Treatments/Interventions/Procedures Outcome: Resolved/Met Goal: Psychosocial 
Outcome: Resolved/Met Goal: *Optimal pain control at patient's stated goal 
Outcome: Resolved/Met Goal: *Hemodynamically stable Outcome: Resolved/Met Goal: *Adequate oxygenation Outcome: Resolved/Met Problem: Anemia Care Plan (Adult and Pediatric) Goal: *Labs within defined limits Outcome: Resolved/Met Goal: *Tolerates increased activity Outcome: Resolved/Met

## 2020-11-20 NOTE — PROGRESS NOTES
Patient contacted regarding recent visit for viral symptoms. No    Outreach made within 2 business days of discharge: Yes    This [employer] contacted the patient by telephone to perform post discharge call. Verified name and  with patient as identifiers. Provided introduction to self, and reason for call due to high risk for COVID-19. Discussed COVID-19 related testing which was not done at this time. Test results were not done. Patient informed of results, if available? Not done     Advance Care Planning:   Does patient have an Advance Directive: Reviewed and current         Discussed exposure protocols and quarantine with CDC Guidelines What To Do If You Are Sick    patient was given an opportunity for questions and concerns. Stay home except to get medical care  Separate yourself from other people and animals in your home  Call ahead before visiting your doctor  Wear a facemask  Cover your coughs and sneezes  Clean your hands often  Avoid sharing personal household items  Clean all high-touch surfaces everyday    Monitor your symptoms  Seek prompt medical attention if your illness is worsening (e.g., difficulty breathing). Before seeking care, call your healthcare provider and tell them that you have, or are being evaluated for, COVID-19. Put on a facemask before you enter the facility. These steps will help the healthcare provider's office to keep other people in the office or waiting room from getting infected or exposed. Ask your healthcare provider to call the local or [facility] health department. Persons who are placed under If you have a medical emergency and need to call 911, notify the dispatch personnel that you have, or are being evaluated for COVID-19. If possible, put on a facemask before emergency medical services arrive. The patient agrees to contact the [facility] exposure line [phone], local health department [facility] and PCP office for questions related to their healthcare.  Author provided contact information for future reference.     Patient/family/caregiver given information for [employer] and agrees to enroll No

## 2020-11-20 NOTE — PROGRESS NOTES
TRANSFER - OUT REPORT:    Verbal report given to Bakari Puentes (name) on Roslynn Stairs  being transferred to IR recovery(unit) for routine progression of care       Report consisted of patients Situation, Background, Assessment and   Recommendations(SBAR). Information from the following report(s) SBAR and Procedure Summary was reviewed with the receiving nurse. Opportunity for questions and clarification was provided. Conscious Sedation:   0 Mcg of Fentanyl administered  0 Mg of Versed administered    Pt tolerated procedure well.      Visit Vitals  /69   Pulse (!) 104   Temp 98.4 °F (36.9 °C)   Resp 16   Ht 5' 4\" (1.626 m)   Wt 84.4 kg (186 lb)   SpO2 100%   Breastfeeding No   BMI 31.93 kg/m²     Past Medical History:   Diagnosis Date    Cancer ([facility] [address]     non hodgkins lymphoma    Contact dermatitis and eczema due to cause     Lupus (HCC)     Rheumatic arteritis                  Venous Access Device power port 11/16/20 (Active)

## 2020-11-20 NOTE — PROCEDURES
Department of Interventional Radiology  [phone]        Interventional Radiology Brief Procedure Note    Patient: Charlette Hunt MRN: [id number]  SSN: [id number]    YOB: 1970  Age: 48 y.o. Sex: female      Date of Procedure: 11/20/2020    Pre-Procedure Diagnosis: DLBCL    Post-Procedure Diagnosis: SAME    Procedure(s): Lumbar Puncture    Brief Description of Procedure: LP with intra-thecal chemotherapy administration. Approximately 12 ml of CSF removed and sent to lab.      Performed By: Timbo Humphries MD     Assistants: None    Anesthesia:Lidocaine    Estimated Blood Loss: Less than 10ml    Specimens:  Sent to lab    Implants:  None    Findings: 12 ml CSF removed    Complications: None    Signed By: Timbo Humphries MD     November 20, 2020

## 2020-11-20 NOTE — DISCHARGE INSTRUCTIONS
[address]  Department of Interventional Radiology  [place] [place] [facility] [place] [facility]  [phone] Office  [phone] Fax  POST LUMBAR PUNCTURE/MYELOGRAM/INTRATHECAL CHEMOTHERAPY DISCHARGE INSTRUCTIONS  General Information:  Lumbar Puncture: A LP is done to help diagnose several disorders, like pseudo tumor, migraines, meningitis, and multiple sclerosis. It involves a puncture (usually in the lower spine) into the sac that protects the spinal column. A sample of the fluid in that space is removed and tested in the lab. Myelogram:   A Myelogram involves a lumbar puncture, and instead of removing fluid, contrast will be injected into the sac surrounding the spinal column. It is done to visualize the spinal column, nerve roots, spinal canal, vertebral discs and disc space. It is usually done to diagnose back pain with unknown cause or in preparation for surgery. After the injection, a CT scan will be done, usually within two hours of the injection. Intrathecal Chemotherapy:   Chemotherapy can be given in many forms. Intrathecal chemo involves a lumbar puncture, and instead of removing fluid, the chemo will be injected into the space. After any of these procedures, you will be asked to lie flat on your back for 4-6 hours to prevent complications. You should also rest for 24 hours after you go home, and force fluids. If you have a headache, you should take Tylenol or acetaminophen. Call If:   You should call your Physician and/or the Radiology Nurse if you develop a headache that is not relieved by Tylenol, and worsens when you stand and eases when you lie down, you need to call. You may have developed what is referred to as a spinal headache. Our physicians will probably advise you to be on strict bed rest for 24 hours, to drink lots of fluids and caffeine. If this does not help the head pain, call again the next day.  You should call if you have bleeding other than a small spot on your bandage. You should call if you have any numbness, tingling, weakness, fever, chills, urinary retention, severe itching, rash, welts, swelling, or confusion. Follow-up Instructions: See the doctor who ordered your procedure as he/she has instructed. If you had a Lumbar Puncture or Myelogram, your results should be available to your ordering doctor in 3-5 business days. You can remove your dressing in 24 hours and shower regularly. Do not bathe or swim for 72 hours. To Reach Us: If you have any questions about your procedure, please call the Interventional Radiology department at [phone]. After business hours (5pm) and weekends, call the answering service at [phone] and ask for the Radiologist on call to be paged. Si tiene Preguntas acerca del procedimiento, por favor llame al departamento de Radiología Intervencional al [phone]. Después de horas de oficina (5 pm) y los fines de [place], llamar al Gaby Kurtis Ibarra al [phone] y pregunte por el Radiologo de [facility]. Interventional Radiology General Nurse Discharge    After general anesthesia or intravenous sedation, for 24 hours or while taking prescription Narcotics:  · Limit your activities  · Do not drive and operate hazardous machinery  · Do not make important personal or business decisions  · Do  not drink alcoholic beverages  · If you have not urinated within 8 hours after discharge, please contact your surgeon on call. * Please give a list of your current medications to your Primary Care Provider. * Please update this list whenever your medications are discontinued, doses are     changed, or new medications (including over-the-counter products) are added. * Please carry medication information at all times in case of emergency situations.     These are general instructions for a healthy lifestyle:    No smoking/ No tobacco products/ Avoid exposure to second hand smoke  Surgeon Omer Villeda Warning:  Quitting smoking now greatly reduces serious risk to your health. Obesity, smoking, and sedentary lifestyle greatly increases your risk for illness  A healthy diet, regular physical exercise & weight monitoring are important for maintaining a healthy lifestyle    You may be retaining fluid if you have a history of heart failure or if you experience any of the following symptoms:  Weight gain of 3 pounds or more overnight or 5 pounds in a week, increased swelling in our hands or feet or shortness of breath while lying flat in bed. Please call your doctor as soon as you notice any of these symptoms; do not wait until your next office visit. Recognize signs and symptoms of STROKE:  F-face looks uneven    A-arms unable to move or move unevenly    S-speech slurred or non-existent    T-time-call 911 as soon as signs and symptoms begin-DO NOT go       Back to bed or wait to see if you get better-TIME IS BRAIN.     Patient Signature:  Date: 11/20/2020  Discharging Nurse: Daina Rea RN

## 2020-11-21 NOTE — PROGRESS NOTES
Arrived to the [facility]. Tony completed. Patient tolerated without adverse reaction. Any issues or concerns during appointment: none. Discharged to home.

## 2020-11-27 NOTE — PROGRESS NOTES
11/27/20 - Patient here for follow up. Patient will get 1 unit PRBC in infusion and return on 11/30 for next check. Patient has PET next week - will work to obtain appts closer together for next week if at all possible.

## 2020-11-27 NOTE — PROGRESS NOTES
Arrived to the [facility]. 1 Unit PRBC's completed; pre-meds Tylenol and Benadryl given. Patient tolerated very well. Any issues or concerns during appointment: none. Patient aware of next infusion appointment on 11/30/2020 @ 0800. Discharged ambulatory to private residence.

## 2020-12-03 NOTE — PROGRESS NOTES
12/3/20 - Patient seen by Dr. Malissa Olson after recent PET scan. Patient understands that clear progression seen on PET at this time. Patient will begin new treatment regimen likely next week and be urgently referred to [facility] for CAR-T. Once plan for future chemotherapy is in place, appointments will be made.

## 2020-12-09 NOTE — PROGRESS NOTES
Pt arrived ambulatory. Tylenol, Benadryl and 1 liter NS bolus given. Rituxan completed without complications. Pt premedicated with Decadron and Zofran, Polatuzumab given over 90 minutes followed by Bendamustine. Pt tolerated well. No adverse reaction noted. Pt monitored x 90 minutes. Pt aware of appt tomorrow at 1400. Discharged ambulatory, no distress noted.

## 2020-12-10 NOTE — PROGRESS NOTES
Arrived to the [facility]. Bendamustine completed. Patient tolerated well. Any issues or concerns during appointment: none. Patient aware of next infusion appointment on 12/12 at 2:00pm. 
Discharged ambulatory to home.

## 2020-12-10 NOTE — PROGRESS NOTES
Problem: Infection - Risk of, Central Venous Catheter-Associated Bloodstream Infection Goal: *Absence of infection signs and symptoms Outcome: Progressing Towards Goal 
  
Problem: Knowledge Deficit Goal: *Verbalizes understanding of procedures and medications Outcome: Progressing Towards Goal 
  
Problem: Patient Education:  Go to Education Activity Goal: Patient/Family Education Outcome: Progressing Towards Goal

## 2020-12-12 NOTE — PROGRESS NOTES
Arrived to the [facility]. Tony completed. Provided [facility] [address] [facility] Patient instructed to report any side affects to ordering provider. Patient tolerated well Any issues or concerns during appointment:No 
Patient has no future appointments in OPI @ this time-patient will call her navigator this week for follow up instructions Discharged home ambulatory

## 2020-12-23 NOTE — PROGRESS NOTES
Pt arrived ambulatory, 1L NS infused, handout given with potassium and magnesium rich foods listed, email sent to NP with pt's lab results, pt discharged home ambulatory

## 2020-12-23 NOTE — PROGRESS NOTES
Blood Bracelet placed on Right Wrist; patient stated she knows to not remove bracelet; Type and Screen Completed. Port was already accessed from [facility] infusion center\"; Port does not have Biopatch; accessed 12/22/2020; dressing clean,dry and intact; great blood return; end cap switched out for new one.

## 2020-12-23 NOTE — PROGRESS NOTES
12/23: Patient seen in the office with Sierra Still NP. Patient doing okay, fatigued and c/o pain on R side, NP to send a prescription for Tramadol for patient to try. Patient scheduled to go to [facility] on 12/28 for CAR-T cell collection and then come back to the clinic on 12/30-12/31 for C2 of BR+Polivy. Hgb 7.0, NP aware, pt states she feels fine and doesn't feel like she needs to be transfused. She will go to infusion today and receive IVF. Encouraged patient to push more fluids and call us with any needs.

## 2020-12-30 NOTE — PROGRESS NOTES
12/30/20 - Patient here for follow up after collection at [facility] for CAR-T. Patient is doing well, no complaints and will proceed with this cycle of BR+ Polivy. Patient will return next week to be evaluated and then have PET on 1/19 and follow with Dr. Luzmaria Colvin after. No other needs at this time.   Patient educated on importance of notifying us should there be any new fever or other signs/symptoms of infection as she really needs to stay healthy for CAR-T.

## 2020-12-30 NOTE — PROGRESS NOTES
[facility] IVF, Magnesium, Rituxan, Polivy, and Bendamustine completed. Patient tolerated without difficulty. Any issues or concerns during appointment: none. Patient aware of next infusion appointment on 12/31/20 (date) at 8:30am (time). Discharged ambulatory accompanied by spouse.

## 2020-12-31 NOTE — PROGRESS NOTES
Arrived to the [facility]. Bendamustine completed. Patient tolerated without adverse reaction. Any issues or concerns during appointment: none. Patient aware of next infusion appointment on 1/2 (date) at 80 (time). Discharged to home.

## 2021-01-01 ENCOUNTER — APPOINTMENT (OUTPATIENT)
Dept: INFUSION THERAPY | Age: 51
End: 2021-01-01

## 2021-01-01 ENCOUNTER — PATIENT OUTREACH (OUTPATIENT)
Dept: CASE MANAGEMENT | Age: 51
End: 2021-01-01

## 2021-01-01 ENCOUNTER — HOSPITAL ENCOUNTER (OUTPATIENT)
Dept: INFUSION THERAPY | Age: 51
Discharge: HOME | End: 2021-01-02
Payer: COMMERCIAL

## 2021-01-01 ENCOUNTER — HOSPITAL ENCOUNTER (OUTPATIENT)
Dept: LAB | Age: 51
Discharge: HOME | End: 2021-01-19
Payer: COMMERCIAL

## 2021-01-01 ENCOUNTER — HOSPITAL ENCOUNTER (OUTPATIENT)
Dept: INFUSION THERAPY | Age: 51
Discharge: HOME | End: 2021-03-19
Payer: COMMERCIAL

## 2021-01-01 ENCOUNTER — HOSPITAL ENCOUNTER (OUTPATIENT)
Dept: LAB | Age: 51
Discharge: HOME | End: 2021-03-19
Payer: COMMERCIAL

## 2021-01-01 ENCOUNTER — HOSPITAL ENCOUNTER (OUTPATIENT)
Dept: LAB | Age: 51
Discharge: HOME | End: 2021-01-05
Payer: COMMERCIAL

## 2021-01-01 ENCOUNTER — HOSPITAL ENCOUNTER (OUTPATIENT)
Dept: ULTRASOUND IMAGING | Age: 51
Discharge: HOME | End: 2021-03-19
Attending: NURSE PRACTITIONER
Payer: COMMERCIAL

## 2021-01-01 ENCOUNTER — HOSPITAL ENCOUNTER (OUTPATIENT)
Dept: LAB | Age: 51
Discharge: HOME | End: 2021-03-29
Payer: COMMERCIAL

## 2021-01-01 ENCOUNTER — HOSPITAL ENCOUNTER (OUTPATIENT)
Dept: LAB | Age: 51
Discharge: HOME | End: 2021-01-15
Payer: COMMERCIAL

## 2021-01-01 VITALS
RESPIRATION RATE: 18 BRPM | OXYGEN SATURATION: 100 % | DIASTOLIC BLOOD PRESSURE: 76 MMHG | HEART RATE: 114 BPM | SYSTOLIC BLOOD PRESSURE: 115 MMHG | TEMPERATURE: 98.4 F

## 2021-01-01 VITALS
TEMPERATURE: 96.9 F | RESPIRATION RATE: 18 BRPM | DIASTOLIC BLOOD PRESSURE: 90 MMHG | SYSTOLIC BLOOD PRESSURE: 136 MMHG | OXYGEN SATURATION: 99 % | HEART RATE: 103 BPM

## 2021-01-01 DIAGNOSIS — C83.37 DIFFUSE LARGE B-CELL LYMPHOMA OF SPLEEN (HCC): ICD-10-CM

## 2021-01-01 DIAGNOSIS — R17 ELEVATED BILIRUBIN: ICD-10-CM

## 2021-01-01 DIAGNOSIS — C83.30 DIFFUSE LARGE B-CELL LYMPHOMA, UNSPECIFIED BODY REGION (HCC): ICD-10-CM

## 2021-01-01 DIAGNOSIS — C83.37 DIFFUSE LARGE B-CELL LYMPHOMA OF SPLEEN (HCC): Primary | ICD-10-CM

## 2021-01-01 DIAGNOSIS — C83.30 DIFFUSE LARGE B-CELL LYMPHOMA, UNSPECIFIED BODY REGION (HCC): Primary | ICD-10-CM

## 2021-01-01 LAB
ABO + RH BLD: NORMAL
ALBUMIN SERPL-MCNC: 2.9 G/DL (ref 3.5–5)
ALBUMIN SERPL-MCNC: 3.1 G/DL (ref 3.5–5)
ALBUMIN SERPL-MCNC: 3.2 G/DL (ref 3.5–5)
ALBUMIN SERPL-MCNC: 3.3 G/DL (ref 3.5–5)
ALBUMIN SERPL-MCNC: 3.6 G/DL (ref 3.5–5)
ALBUMIN/GLOB SERPL: 0.8 {RATIO} (ref 1.2–3.5)
ALBUMIN/GLOB SERPL: 0.9 {RATIO} (ref 1.2–3.5)
ALBUMIN/GLOB SERPL: 0.9 {RATIO} (ref 1.2–3.5)
ALBUMIN/GLOB SERPL: 1.3 {RATIO} (ref 1.2–3.5)
ALBUMIN/GLOB SERPL: 1.3 {RATIO} (ref 1.2–3.5)
ALP SERPL-CCNC: 1081 U/L (ref 50–136)
ALP SERPL-CCNC: 574 U/L (ref 50–136)
ALP SERPL-CCNC: 746 U/L (ref 50–136)
ALP SERPL-CCNC: 982 U/L (ref 50–136)
ALP SERPL-CCNC: 995 U/L (ref 50–136)
ALT SERPL-CCNC: 107 U/L (ref 12–65)
ALT SERPL-CCNC: 141 U/L (ref 12–65)
ALT SERPL-CCNC: 35 U/L (ref 12–65)
ALT SERPL-CCNC: 40 U/L (ref 12–65)
ALT SERPL-CCNC: 55 U/L (ref 12–65)
ANION GAP SERPL CALC-SCNC: 13 MMOL/L (ref 7–16)
ANION GAP SERPL CALC-SCNC: 5 MMOL/L (ref 7–16)
ANION GAP SERPL CALC-SCNC: 6 MMOL/L (ref 7–16)
ANION GAP SERPL CALC-SCNC: 7 MMOL/L (ref 7–16)
ANION GAP SERPL CALC-SCNC: 9 MMOL/L (ref 7–16)
AST SERPL-CCNC: 126 U/L (ref 15–37)
AST SERPL-CCNC: 297 U/L (ref 15–37)
AST SERPL-CCNC: 311 U/L (ref 15–37)
AST SERPL-CCNC: 73 U/L (ref 15–37)
AST SERPL-CCNC: 93 U/L (ref 15–37)
BASOPHILS # BLD: 0 K/UL (ref 0–0.2)
BASOPHILS NFR BLD: 1 % (ref 0–2)
BASOPHILS NFR BLD: 2 % (ref 0–2)
BILIRUB SERPL-MCNC: 1.4 MG/DL (ref 0.2–1.1)
BILIRUB SERPL-MCNC: 1.5 MG/DL (ref 0.2–1.1)
BILIRUB SERPL-MCNC: 14 MG/DL (ref 0.2–1.1)
BILIRUB SERPL-MCNC: 2.1 MG/DL (ref 0.2–1.1)
BILIRUB SERPL-MCNC: 5.1 MG/DL (ref 0.2–1.1)
BLD PROD TYP BPU: NORMAL
BLOOD BANK CMNT PATIENT-IMP: NORMAL
BLOOD GROUP ANTIBODIES SERPL: NORMAL
BPU ID: NORMAL
BUN SERPL-MCNC: 16 MG/DL (ref 6–23)
BUN SERPL-MCNC: 3 MG/DL (ref 6–23)
BUN SERPL-MCNC: 6 MG/DL (ref 6–23)
BUN SERPL-MCNC: 6 MG/DL (ref 6–23)
BUN SERPL-MCNC: 8 MG/DL (ref 6–23)
CALCIUM SERPL-MCNC: 10 MG/DL (ref 8.3–10.4)
CALCIUM SERPL-MCNC: 10 MG/DL (ref 8.3–10.4)
CALCIUM SERPL-MCNC: 10.2 MG/DL (ref 8.3–10.4)
CALCIUM SERPL-MCNC: 9.3 MG/DL (ref 8.3–10.4)
CALCIUM SERPL-MCNC: 9.8 MG/DL (ref 8.3–10.4)
CHLORIDE SERPL-SCNC: 102 MMOL/L (ref 98–107)
CHLORIDE SERPL-SCNC: 103 MMOL/L (ref 98–107)
CHLORIDE SERPL-SCNC: 104 MMOL/L (ref 98–107)
CHLORIDE SERPL-SCNC: 104 MMOL/L (ref 98–107)
CHLORIDE SERPL-SCNC: 98 MMOL/L (ref 98–107)
CO2 SERPL-SCNC: 26 MMOL/L (ref 21–32)
CO2 SERPL-SCNC: 27 MMOL/L (ref 21–32)
CO2 SERPL-SCNC: 28 MMOL/L (ref 21–32)
CO2 SERPL-SCNC: 30 MMOL/L (ref 21–32)
CO2 SERPL-SCNC: 31 MMOL/L (ref 21–32)
CREAT SERPL-MCNC: 0.5 MG/DL (ref 0.6–1)
CREAT SERPL-MCNC: 0.5 MG/DL (ref 0.6–1)
CREAT SERPL-MCNC: 0.7 MG/DL (ref 0.6–1)
CREAT SERPL-MCNC: 0.7 MG/DL (ref 0.6–1)
CREAT SERPL-MCNC: 0.9 MG/DL (ref 0.6–1)
DIFFERENTIAL METHOD BLD: ABNORMAL
EOSINOPHIL # BLD: 0 K/UL (ref 0–0.8)
EOSINOPHIL # BLD: 0.2 K/UL (ref 0–0.8)
EOSINOPHIL NFR BLD: 0 % (ref 0.5–7.8)
EOSINOPHIL NFR BLD: 1 % (ref 0.5–7.8)
EOSINOPHIL NFR BLD: 29 % (ref 0.5–7.8)
EOSINOPHIL NFR BLD: 6 % (ref 0.5–7.8)
ERYTHROCYTE [DISTWIDTH] IN BLOOD BY AUTOMATED COUNT: 20.1 % (ref 11.9–14.6)
ERYTHROCYTE [DISTWIDTH] IN BLOOD BY AUTOMATED COUNT: 21.2 % (ref 11.9–14.6)
ERYTHROCYTE [DISTWIDTH] IN BLOOD BY AUTOMATED COUNT: 21.3 % (ref 11.9–14.6)
ERYTHROCYTE [DISTWIDTH] IN BLOOD BY AUTOMATED COUNT: 21.4 % (ref 11.9–14.6)
ERYTHROCYTE [DISTWIDTH] IN BLOOD BY AUTOMATED COUNT: 21.8 % (ref 11.9–14.6)
GLOBULIN SER CALC-MCNC: 2.5 G/DL (ref 2.3–3.5)
GLOBULIN SER CALC-MCNC: 2.8 G/DL (ref 2.3–3.5)
GLOBULIN SER CALC-MCNC: 3.4 G/DL (ref 2.3–3.5)
GLOBULIN SER CALC-MCNC: 3.6 G/DL (ref 2.3–3.5)
GLOBULIN SER CALC-MCNC: 3.7 G/DL (ref 2.3–3.5)
GLUCOSE SERPL-MCNC: 113 MG/DL (ref 65–100)
GLUCOSE SERPL-MCNC: 119 MG/DL (ref 65–100)
GLUCOSE SERPL-MCNC: 133 MG/DL (ref 65–100)
GLUCOSE SERPL-MCNC: 148 MG/DL (ref 65–100)
GLUCOSE SERPL-MCNC: 155 MG/DL (ref 65–100)
HCT VFR BLD AUTO: 23.9 % (ref 35.8–46.3)
HCT VFR BLD AUTO: 29.3 % (ref 35.8–46.3)
HCT VFR BLD AUTO: 32.5 % (ref 35.8–46.3)
HCT VFR BLD AUTO: 33.2 % (ref 35.8–46.3)
HCT VFR BLD AUTO: 35.3 % (ref 35.8–46.3)
HGB BLD-MCNC: 10.5 G/DL (ref 11.7–15.4)
HGB BLD-MCNC: 10.6 G/DL (ref 11.7–15.4)
HGB BLD-MCNC: 10.8 G/DL (ref 11.7–15.4)
HGB BLD-MCNC: 8.1 G/DL (ref 11.7–15.4)
HGB BLD-MCNC: 9.5 G/DL (ref 11.7–15.4)
IMM GRANULOCYTES # BLD AUTO: 0 K/UL (ref 0–0.5)
IMM GRANULOCYTES # BLD AUTO: 0.1 K/UL (ref 0–0.5)
IMM GRANULOCYTES # BLD AUTO: 0.2 K/UL (ref 0–0.5)
IMM GRANULOCYTES # BLD AUTO: 0.2 K/UL (ref 0–0.5)
IMM GRANULOCYTES NFR BLD AUTO: 0 % (ref 0–5)
IMM GRANULOCYTES NFR BLD AUTO: 5 % (ref 0–5)
IMM GRANULOCYTES NFR BLD AUTO: 6 % (ref 0–5)
IMM GRANULOCYTES NFR BLD AUTO: 7 % (ref 0–5)
LDH SERPL L TO P-CCNC: 1366 U/L (ref 100–190)
LDH SERPL L TO P-CCNC: 3305 U/L (ref 100–190)
LYMPHOCYTES # BLD: 0 K/UL (ref 0.5–4.6)
LYMPHOCYTES # BLD: 0.2 K/UL (ref 0.5–4.6)
LYMPHOCYTES NFR BLD: 33 % (ref 13–44)
LYMPHOCYTES NFR BLD: 4 % (ref 13–44)
LYMPHOCYTES NFR BLD: 5 % (ref 13–44)
LYMPHOCYTES NFR BLD: 6 % (ref 13–44)
MAGNESIUM SERPL-MCNC: 1 MG/DL (ref 1.8–2.4)
MAGNESIUM SERPL-MCNC: 1.1 MG/DL (ref 1.8–2.4)
MAGNESIUM SERPL-MCNC: 1.4 MG/DL (ref 1.8–2.4)
MAGNESIUM SERPL-MCNC: 1.4 MG/DL (ref 1.8–2.4)
MAGNESIUM SERPL-MCNC: 1.9 MG/DL (ref 1.8–2.4)
MCH RBC QN AUTO: 29.9 PG (ref 26.1–32.9)
MCH RBC QN AUTO: 30.1 PG (ref 26.1–32.9)
MCH RBC QN AUTO: 30.4 PG (ref 26.1–32.9)
MCH RBC QN AUTO: 30.5 PG (ref 26.1–32.9)
MCH RBC QN AUTO: 30.8 PG (ref 26.1–32.9)
MCHC RBC AUTO-ENTMCNC: 30 G/DL (ref 31.4–35)
MCHC RBC AUTO-ENTMCNC: 31.6 G/DL (ref 31.4–35)
MCHC RBC AUTO-ENTMCNC: 32.4 G/DL (ref 31.4–35)
MCHC RBC AUTO-ENTMCNC: 33.2 G/DL (ref 31.4–35)
MCHC RBC AUTO-ENTMCNC: 33.9 G/DL (ref 31.4–35)
MCV RBC AUTO: 90.9 FL (ref 79.6–97.8)
MCV RBC AUTO: 91.5 FL (ref 79.6–97.8)
MCV RBC AUTO: 92.7 FL (ref 79.6–97.8)
MCV RBC AUTO: 96.5 FL (ref 79.6–97.8)
MCV RBC AUTO: 99.4 FL (ref 79.6–97.8)
MONOCYTES # BLD: 0.1 K/UL (ref 0.1–1.3)
MONOCYTES # BLD: 0.2 K/UL (ref 0.1–1.3)
MONOCYTES # BLD: 0.9 K/UL (ref 0.1–1.3)
MONOCYTES # BLD: 1 K/UL (ref 0.1–1.3)
MONOCYTES NFR BLD: 19 % (ref 4–12)
MONOCYTES NFR BLD: 22 % (ref 4–12)
MONOCYTES NFR BLD: 26 % (ref 4–12)
MONOCYTES NFR BLD: 31 % (ref 4–12)
NEUTS SEG # BLD: 0.1 K/UL (ref 1.7–8.2)
NEUTS SEG # BLD: 0.5 K/UL (ref 1.7–8.2)
NEUTS SEG # BLD: 1.7 K/UL (ref 1.7–8.2)
NEUTS SEG # BLD: 2.7 K/UL (ref 1.7–8.2)
NEUTS SEG NFR BLD: 17 % (ref 43–78)
NEUTS SEG NFR BLD: 55 % (ref 43–78)
NEUTS SEG NFR BLD: 56 % (ref 43–78)
NEUTS SEG NFR BLD: 67 % (ref 43–78)
NRBC # BLD: 0 K/UL (ref 0–0.2)
NRBC # BLD: 0 K/UL (ref 0–0.2)
NRBC # BLD: 0.02 K/UL (ref 0–0.2)
PLATELET # BLD AUTO: 11 K/UL (ref 150–450)
PLATELET # BLD AUTO: 120 K/UL (ref 150–450)
PLATELET # BLD AUTO: 14 K/UL (ref 150–450)
PLATELET # BLD AUTO: 24 K/UL (ref 150–450)
PLATELET # BLD AUTO: 96 K/UL (ref 150–450)
PLATELET COMMENTS,PCOM: ABNORMAL
PLATELET COMMENTS,PCOM: SLIGHT
PMV BLD AUTO: 10.7 FL (ref 9.4–12.3)
PMV BLD AUTO: 11 FL (ref 9.4–12.3)
PMV BLD AUTO: 11 FL (ref 9.4–12.3)
PMV BLD AUTO: 9.7 FL (ref 9.4–12.3)
PMV BLD AUTO: ABNORMAL FL (ref 9.4–12.3)
POTASSIUM SERPL-SCNC: 3.4 MMOL/L (ref 3.5–5.1)
POTASSIUM SERPL-SCNC: 3.6 MMOL/L (ref 3.5–5.1)
POTASSIUM SERPL-SCNC: 4.4 MMOL/L (ref 3.5–5.1)
PROT SERPL-MCNC: 5.8 G/DL (ref 6.3–8.2)
PROT SERPL-MCNC: 6.4 G/DL (ref 6.3–8.2)
PROT SERPL-MCNC: 6.5 G/DL (ref 6.3–8.2)
PROT SERPL-MCNC: 6.5 G/DL (ref 6.3–8.2)
PROT SERPL-MCNC: 6.9 G/DL (ref 6.3–8.2)
RBC # BLD AUTO: 2.63 M/UL (ref 4.05–5.2)
RBC # BLD AUTO: 3.16 M/UL (ref 4.05–5.25)
RBC # BLD AUTO: 3.44 M/UL (ref 4.05–5.25)
RBC # BLD AUTO: 3.55 M/UL (ref 4.05–5.25)
RBC # BLD AUTO: 3.55 M/UL (ref 4.05–5.25)
RBC MORPH BLD: ABNORMAL
SODIUM SERPL-SCNC: 137 MMOL/L (ref 136–145)
SODIUM SERPL-SCNC: 137 MMOL/L (ref 136–145)
SODIUM SERPL-SCNC: 139 MMOL/L (ref 136–145)
SODIUM SERPL-SCNC: 139 MMOL/L (ref 136–145)
SODIUM SERPL-SCNC: 141 MMOL/L (ref 136–145)
SPECIMEN EXP DATE BLD: NORMAL
STATUS OF UNIT,%ST: NORMAL
UNIT DIVISION, %UDIV: 0
WBC # BLD AUTO: 0.2 K/UL (ref 4.3–11.1)
WBC # BLD AUTO: 0.6 K/UL (ref 4.3–11.1)
WBC # BLD AUTO: 0.8 K/UL (ref 4.3–11.1)
WBC # BLD AUTO: 3.1 K/UL (ref 4.3–11.1)
WBC # BLD AUTO: 4 K/UL (ref 4.3–11.1)
WBC MORPH BLD: ABNORMAL

## 2021-01-01 PROCEDURE — 86644 CMV ANTIBODY: CPT

## 2021-01-01 PROCEDURE — 80053 COMPREHEN METABOLIC PANEL: CPT

## 2021-01-01 PROCEDURE — 83615 LACTATE (LD) (LDH) ENZYME: CPT

## 2021-01-01 PROCEDURE — 83735 ASSAY OF MAGNESIUM: CPT

## 2021-01-01 PROCEDURE — 77030018667 ADMN ST IV BLD FENW -A

## 2021-01-01 PROCEDURE — 74011250636 HC RX REV CODE- 250/636: Performed by: INTERNAL MEDICINE

## 2021-01-01 PROCEDURE — 96366 THER/PROPH/DIAG IV INF ADDON: CPT

## 2021-01-01 PROCEDURE — 86850 RBC ANTIBODY SCREEN: CPT

## 2021-01-01 PROCEDURE — 36415 COLL VENOUS BLD VENIPUNCTURE: CPT

## 2021-01-01 PROCEDURE — 96365 THER/PROPH/DIAG IV INF INIT: CPT

## 2021-01-01 PROCEDURE — 85025 COMPLETE CBC W/AUTO DIFF WBC: CPT

## 2021-01-01 PROCEDURE — 96372 THER/PROPH/DIAG INJ SC/IM: CPT

## 2021-01-01 PROCEDURE — P9037 PLATE PHERES LEUKOREDU IRRAD: HCPCS

## 2021-01-01 PROCEDURE — 74011250636 HC RX REV CODE- 250/636: Performed by: NURSE PRACTITIONER

## 2021-01-01 PROCEDURE — 74011250637 HC RX REV CODE- 250/637: Performed by: INTERNAL MEDICINE

## 2021-01-01 PROCEDURE — 36430 TRANSFUSION BLD/BLD COMPNT: CPT

## 2021-01-01 RX ORDER — ACETAMINOPHEN 325 MG/1
650 TABLET ORAL ONCE
Status: DISCONTINUED | OUTPATIENT
Start: 2021-01-01 | End: 2021-01-01 | Stop reason: HOSPADM

## 2021-01-01 RX ORDER — DIPHENHYDRAMINE HCL 25 MG
25 CAPSULE ORAL
Status: DISCONTINUED | OUTPATIENT
Start: 2021-01-01 | End: 2021-01-01 | Stop reason: HOSPADM

## 2021-01-01 RX ORDER — MAGNESIUM SULFATE HEPTAHYDRATE 40 MG/ML
4 INJECTION, SOLUTION INTRAVENOUS ONCE
Status: COMPLETED | OUTPATIENT
Start: 2021-01-01 | End: 2021-01-01

## 2021-01-01 RX ORDER — SODIUM CHLORIDE 9 MG/ML
250 INJECTION, SOLUTION INTRAVENOUS AS NEEDED
Status: DISCONTINUED | OUTPATIENT
Start: 2021-01-01 | End: 2021-01-01 | Stop reason: HOSPADM

## 2021-01-01 RX ADMIN — PEGFILGRASTIM-CBQV 6 MG: 6 INJECTION, SOLUTION SUBCUTANEOUS at 10:50

## 2021-01-01 RX ADMIN — MAGNESIUM SULFATE IN WATER 4 G: 40 INJECTION, SOLUTION INTRAVENOUS at 14:52

## 2021-01-01 RX ADMIN — DIPHENHYDRAMINE HYDROCHLORIDE 25 MG: 25 CAPSULE ORAL at 14:03

## 2021-01-01 RX ADMIN — SODIUM CHLORIDE 250 ML: 900 INJECTION, SOLUTION INTRAVENOUS at 14:20

## 2021-01-02 NOTE — PROGRESS NOTES
Arrived to the [facility]. Udenyca injection completed. Patient tolerated well. Any issues or concerns during appointment: none. Patient aware of next infusion appointment on 1/21 (date) at 11:00 AM (time). Discharged ambulatory.

## 2021-01-12 NOTE — PROGRESS NOTES
1/5/21 - Patient here for follow up. Patient iwll have PET scan on 1/19 and see Dr. Nick Armstrong after and prepare to report to [facility] on 1/20. Patient has no questions at this time and is feeling well.

## 2021-01-19 NOTE — PROGRESS NOTES
1/19/21 - Patient here for follow up. [facility] had PET done and will see her again tomorrow with plans for CAR-T next week.  Will arrange follow up with guidance from [facility]

## 2021-03-19 NOTE — PROGRESS NOTES
Arrived to the [facility]. 1 unit Platelets and 4g Mg completed. Patient tolerated well. Any issues or concerns during appointment: none. No further infusion appts needed at this time. Discharged ambulatory with family. [place]